# Patient Record
Sex: FEMALE | Race: WHITE | NOT HISPANIC OR LATINO | Employment: OTHER | URBAN - METROPOLITAN AREA
[De-identification: names, ages, dates, MRNs, and addresses within clinical notes are randomized per-mention and may not be internally consistent; named-entity substitution may affect disease eponyms.]

---

## 2017-11-27 ENCOUNTER — CONVERSION ENCOUNTER (OUTPATIENT)
Dept: GENERAL RADIOLOGY | Facility: HOSPITAL | Age: 62
End: 2017-11-27

## 2018-05-07 ENCOUNTER — OFFICE VISIT CONVERTED (OUTPATIENT)
Dept: ORTHOPEDIC SURGERY | Facility: CLINIC | Age: 63
End: 2018-05-07
Attending: ORTHOPAEDIC SURGERY

## 2018-11-06 ENCOUNTER — OFFICE VISIT CONVERTED (OUTPATIENT)
Dept: SURGERY | Facility: CLINIC | Age: 63
End: 2018-11-06
Attending: NURSE PRACTITIONER

## 2018-12-21 ENCOUNTER — CONVERSION ENCOUNTER (OUTPATIENT)
Dept: GENERAL RADIOLOGY | Facility: HOSPITAL | Age: 63
End: 2018-12-21

## 2019-01-04 ENCOUNTER — HOSPITAL ENCOUNTER (OUTPATIENT)
Dept: GASTROENTEROLOGY | Facility: HOSPITAL | Age: 64
Setting detail: HOSPITAL OUTPATIENT SURGERY
Discharge: HOME | End: 2019-01-04
Attending: SURGERY

## 2019-01-04 LAB — GLUCOSE BLD-MCNC: 150 MG/DL (ref 65–99)

## 2019-02-28 ENCOUNTER — HOSPITAL ENCOUNTER (OUTPATIENT)
Dept: SLEEP MEDICINE | Facility: HOSPITAL | Age: 64
Discharge: HOME | End: 2019-02-28
Attending: INTERNAL MEDICINE

## 2020-02-13 ENCOUNTER — HOSPITAL ENCOUNTER (OUTPATIENT)
Dept: LAB | Facility: HOSPITAL | Age: 65
Discharge: HOME | End: 2020-02-13
Attending: INTERNAL MEDICINE

## 2020-02-13 LAB
ANION GAP SERPL CALC-SCNC: 18 MMOL/L (ref 8–19)
BUN SERPL-MCNC: 13 MG/DL (ref 5–25)
BUN/CREAT SERPL: 14 {RATIO} (ref 6–20)
CALCIUM SERPL-MCNC: 9.3 MG/DL (ref 8.7–10.4)
CHLORIDE SERPL-SCNC: 98 MMOL/L (ref 99–111)
CONV CO2: 27 MMOL/L (ref 22–32)
CREAT UR-MCNC: 0.94 MG/DL (ref 0.5–0.9)
GFR SERPLBLD BASED ON 1.73 SQ M-ARVRAT: >60 ML/MIN/{1.73_M2}
GLUCOSE SERPL-MCNC: 122 MG/DL (ref 65–99)
OSMOLALITY SERPL CALC.SUM OF ELEC: 289 MOSM/KG (ref 273–304)
POTASSIUM SERPL-SCNC: 4.3 MMOL/L (ref 3.5–5.3)
SODIUM SERPL-SCNC: 139 MMOL/L (ref 135–147)

## 2020-05-29 ENCOUNTER — HOSPITAL ENCOUNTER (OUTPATIENT)
Dept: FAMILY MEDICINE CLINIC | Facility: CLINIC | Age: 65
Discharge: HOME | End: 2020-05-29
Attending: NURSE PRACTITIONER

## 2020-05-29 ENCOUNTER — CONVERSION ENCOUNTER (OUTPATIENT)
Dept: FAMILY MEDICINE CLINIC | Facility: CLINIC | Age: 65
End: 2020-05-29

## 2020-05-29 ENCOUNTER — OFFICE VISIT CONVERTED (OUTPATIENT)
Dept: FAMILY MEDICINE CLINIC | Facility: CLINIC | Age: 65
End: 2020-05-29
Attending: NURSE PRACTITIONER

## 2020-05-29 LAB
ALBUMIN SERPL-MCNC: 4.5 G/DL (ref 3.5–5)
ALBUMIN/GLOB SERPL: 1.6 {RATIO} (ref 1.4–2.6)
ALP SERPL-CCNC: 130 U/L (ref 43–160)
ALT SERPL-CCNC: 53 U/L (ref 10–40)
ANION GAP SERPL CALC-SCNC: 18 MMOL/L (ref 8–19)
APPEARANCE UR: CLEAR
AST SERPL-CCNC: 36 U/L (ref 15–50)
BASOPHILS # BLD AUTO: 0.06 10*3/UL (ref 0–0.2)
BASOPHILS NFR BLD AUTO: 0.8 % (ref 0–3)
BILIRUB SERPL-MCNC: 0.28 MG/DL (ref 0.2–1.3)
BILIRUB UR QL: NEGATIVE
BUN SERPL-MCNC: 18 MG/DL (ref 5–25)
BUN/CREAT SERPL: 18 {RATIO} (ref 6–20)
CALCIUM SERPL-MCNC: 10.8 MG/DL (ref 8.7–10.4)
CHLORIDE SERPL-SCNC: 101 MMOL/L (ref 99–111)
CHOLEST SERPL-MCNC: 148 MG/DL (ref 107–200)
CHOLEST/HDLC SERPL: 2.9 {RATIO} (ref 3–6)
COLOR UR: YELLOW
CONV ABS IMM GRAN: 0.1 10*3/UL (ref 0–0.2)
CONV CO2: 27 MMOL/L (ref 22–32)
CONV COLLECTION SOURCE (UA): NORMAL
CONV CREATININE URINE, RANDOM: 172.8 MG/DL (ref 10–300)
CONV IMMATURE GRAN: 1.3 % (ref 0–1.8)
CONV MICROALBUM.,U,RANDOM: <12 MG/L (ref 0–20)
CONV TOTAL PROTEIN: 7.3 G/DL (ref 6.3–8.2)
CONV UROBILINOGEN IN URINE BY AUTOMATED TEST STRIP: 0.2 {EHRLICHU}/DL (ref 0.1–1)
CREAT UR-MCNC: 0.99 MG/DL (ref 0.5–0.9)
DEPRECATED RDW RBC AUTO: 43.9 FL (ref 36.4–46.3)
EOSINOPHIL # BLD AUTO: 0.32 10*3/UL (ref 0–0.7)
EOSINOPHIL # BLD AUTO: 4.1 % (ref 0–7)
ERYTHROCYTE [DISTWIDTH] IN BLOOD BY AUTOMATED COUNT: 14.7 % (ref 11.7–14.4)
EST. AVERAGE GLUCOSE BLD GHB EST-MCNC: 169 MG/DL
FOLATE SERPL-MCNC: >20 NG/ML (ref 4.8–20)
GFR SERPLBLD BASED ON 1.73 SQ M-ARVRAT: 60 ML/MIN/{1.73_M2}
GLOBULIN UR ELPH-MCNC: 2.8 G/DL (ref 2–3.5)
GLUCOSE SERPL-MCNC: 115 MG/DL (ref 65–99)
GLUCOSE UR QL: NEGATIVE MG/DL
HBA1C MFR BLD: 7.5 % (ref 3.5–5.7)
HCT VFR BLD AUTO: 42.2 % (ref 37–47)
HDLC SERPL-MCNC: 51 MG/DL (ref 40–60)
HGB BLD-MCNC: 13.2 G/DL (ref 12–16)
HGB UR QL STRIP: NEGATIVE
IRON SATN MFR SERPL: 18 % (ref 20–55)
IRON SERPL-MCNC: 63 UG/DL (ref 60–170)
KETONES UR QL STRIP: NEGATIVE MG/DL
LDLC SERPL CALC-MCNC: 65 MG/DL (ref 70–100)
LEUKOCYTE ESTERASE UR QL STRIP: NEGATIVE
LYMPHOCYTES # BLD AUTO: 2.16 10*3/UL (ref 1–5)
LYMPHOCYTES NFR BLD AUTO: 27.5 % (ref 20–45)
MCH RBC QN AUTO: 25.5 PG (ref 27–31)
MCHC RBC AUTO-ENTMCNC: 31.3 G/DL (ref 33–37)
MCV RBC AUTO: 81.5 FL (ref 81–99)
MICROALBUMIN/CREAT UR: 6.9 MG/G{CRE} (ref 0–35)
MONOCYTES # BLD AUTO: 1.01 10*3/UL (ref 0.2–1.2)
MONOCYTES NFR BLD AUTO: 12.9 % (ref 3–10)
NEUTROPHILS # BLD AUTO: 4.2 10*3/UL (ref 2–8)
NEUTROPHILS NFR BLD AUTO: 53.4 % (ref 30–85)
NITRITE UR QL STRIP: NEGATIVE
NRBC CBCN: 0 % (ref 0–0.7)
OSMOLALITY SERPL CALC.SUM OF ELEC: 295 MOSM/KG (ref 273–304)
PH UR STRIP.AUTO: 5 [PH] (ref 5–8)
PLATELET # BLD AUTO: 277 10*3/UL (ref 130–400)
PMV BLD AUTO: 10.8 FL (ref 9.4–12.3)
POTASSIUM SERPL-SCNC: 4.8 MMOL/L (ref 3.5–5.3)
PROT UR QL: NEGATIVE MG/DL
RBC # BLD AUTO: 5.18 10*6/UL (ref 4.2–5.4)
SODIUM SERPL-SCNC: 141 MMOL/L (ref 135–147)
SP GR UR: 1.02 (ref 1–1.03)
T4 FREE SERPL-MCNC: 1.1 NG/DL (ref 0.9–1.8)
TIBC SERPL-MCNC: 342 UG/DL (ref 245–450)
TRANSFERRIN SERPL-MCNC: 239 MG/DL (ref 250–380)
TRIGL SERPL-MCNC: 162 MG/DL (ref 40–150)
TSH SERPL-ACNC: 4.39 M[IU]/L (ref 0.27–4.2)
VIT B12 SERPL-MCNC: 689 PG/ML (ref 211–911)
VLDLC SERPL-MCNC: 32 MG/DL (ref 5–37)
WBC # BLD AUTO: 7.85 10*3/UL (ref 4.8–10.8)

## 2020-06-12 ENCOUNTER — OFFICE VISIT CONVERTED (OUTPATIENT)
Dept: FAMILY MEDICINE CLINIC | Facility: CLINIC | Age: 65
End: 2020-06-12
Attending: NURSE PRACTITIONER

## 2020-06-12 ENCOUNTER — CONVERSION ENCOUNTER (OUTPATIENT)
Dept: FAMILY MEDICINE CLINIC | Facility: CLINIC | Age: 65
End: 2020-06-12

## 2020-06-18 ENCOUNTER — OFFICE VISIT CONVERTED (OUTPATIENT)
Dept: PODIATRY | Facility: CLINIC | Age: 65
End: 2020-06-18
Attending: PODIATRIST

## 2020-06-18 ENCOUNTER — CONVERSION ENCOUNTER (OUTPATIENT)
Dept: PODIATRY | Facility: CLINIC | Age: 65
End: 2020-06-18

## 2020-06-26 ENCOUNTER — OFFICE VISIT CONVERTED (OUTPATIENT)
Dept: SURGERY | Facility: CLINIC | Age: 65
End: 2020-06-26
Attending: NURSE PRACTITIONER

## 2020-07-13 ENCOUNTER — HOSPITAL ENCOUNTER (OUTPATIENT)
Dept: LAB | Facility: HOSPITAL | Age: 65
Discharge: HOME | End: 2020-07-13
Attending: NURSE PRACTITIONER

## 2020-07-13 LAB
T4 FREE SERPL-MCNC: 1.1 NG/DL (ref 0.9–1.8)
TSH SERPL-ACNC: 1.7 M[IU]/L (ref 0.27–4.2)

## 2020-07-29 ENCOUNTER — HOSPITAL ENCOUNTER (OUTPATIENT)
Dept: GENERAL RADIOLOGY | Facility: HOSPITAL | Age: 65
Discharge: HOME | End: 2020-07-29
Attending: NURSE PRACTITIONER

## 2020-08-06 ENCOUNTER — HOSPITAL ENCOUNTER (OUTPATIENT)
Dept: PREADMISSION TESTING | Facility: HOSPITAL | Age: 65
Discharge: HOME | End: 2020-08-06
Attending: SURGERY

## 2020-08-07 LAB — SARS-COV-2 RNA SPEC QL NAA+PROBE: NOT DETECTED

## 2020-08-10 ENCOUNTER — HOSPITAL ENCOUNTER (OUTPATIENT)
Dept: GASTROENTEROLOGY | Facility: HOSPITAL | Age: 65
Setting detail: HOSPITAL OUTPATIENT SURGERY
Discharge: HOME | End: 2020-08-10
Attending: SURGERY

## 2020-08-10 LAB — GLUCOSE BLD-MCNC: 125 MG/DL (ref 65–99)

## 2020-08-26 ENCOUNTER — OFFICE VISIT CONVERTED (OUTPATIENT)
Dept: SURGERY | Facility: CLINIC | Age: 65
End: 2020-08-26
Attending: NURSE PRACTITIONER

## 2020-09-11 ENCOUNTER — HOSPITAL ENCOUNTER (OUTPATIENT)
Dept: LAB | Facility: HOSPITAL | Age: 65
Discharge: HOME | End: 2020-09-11
Attending: NURSE PRACTITIONER

## 2020-09-11 LAB
APPEARANCE UR: CLEAR
BASOPHILS # BLD AUTO: 0.08 10*3/UL (ref 0–0.2)
BASOPHILS NFR BLD AUTO: 1 % (ref 0–3)
BILIRUB UR QL: NEGATIVE
COLOR UR: YELLOW
CONV ABS IMM GRAN: 0.06 10*3/UL (ref 0–0.2)
CONV COLLECTION SOURCE (UA): NORMAL
CONV IMMATURE GRAN: 0.8 % (ref 0–1.8)
CONV UROBILINOGEN IN URINE BY AUTOMATED TEST STRIP: 0.2 {EHRLICHU}/DL (ref 0.1–1)
DEPRECATED RDW RBC AUTO: 45.5 FL (ref 36.4–46.3)
EOSINOPHIL # BLD AUTO: 0.33 10*3/UL (ref 0–0.7)
EOSINOPHIL # BLD AUTO: 4.2 % (ref 0–7)
ERYTHROCYTE [DISTWIDTH] IN BLOOD BY AUTOMATED COUNT: 14.9 % (ref 11.7–14.4)
GLUCOSE UR QL: NEGATIVE MG/DL
HCT VFR BLD AUTO: 43.2 % (ref 37–47)
HGB BLD-MCNC: 12.8 G/DL (ref 12–16)
HGB UR QL STRIP: NEGATIVE
KETONES UR QL STRIP: NEGATIVE MG/DL
LEUKOCYTE ESTERASE UR QL STRIP: NEGATIVE
LYMPHOCYTES # BLD AUTO: 2.26 10*3/UL (ref 1–5)
LYMPHOCYTES NFR BLD AUTO: 28.6 % (ref 20–45)
MCH RBC QN AUTO: 24.8 PG (ref 27–31)
MCHC RBC AUTO-ENTMCNC: 29.6 G/DL (ref 33–37)
MCV RBC AUTO: 83.6 FL (ref 81–99)
MONOCYTES # BLD AUTO: 0.99 10*3/UL (ref 0.2–1.2)
MONOCYTES NFR BLD AUTO: 12.5 % (ref 3–10)
NEUTROPHILS # BLD AUTO: 4.17 10*3/UL (ref 2–8)
NEUTROPHILS NFR BLD AUTO: 52.9 % (ref 30–85)
NITRITE UR QL STRIP: NEGATIVE
NRBC CBCN: 0 % (ref 0–0.7)
PH UR STRIP.AUTO: 5 [PH] (ref 5–8)
PLATELET # BLD AUTO: 285 10*3/UL (ref 130–400)
PMV BLD AUTO: 11 FL (ref 9.4–12.3)
PROT UR QL: NEGATIVE MG/DL
RBC # BLD AUTO: 5.17 10*6/UL (ref 4.2–5.4)
SP GR UR: 1.02 (ref 1–1.03)
T4 FREE SERPL-MCNC: 1 NG/DL (ref 0.9–1.8)
TSH SERPL-ACNC: 2.17 M[IU]/L (ref 0.27–4.2)
WBC # BLD AUTO: 7.89 10*3/UL (ref 4.8–10.8)

## 2020-09-12 LAB
ALBUMIN SERPL-MCNC: 4.3 G/DL (ref 3.5–5)
ALBUMIN/GLOB SERPL: 1.5 {RATIO} (ref 1.4–2.6)
ALP SERPL-CCNC: 122 U/L (ref 43–160)
ALT SERPL-CCNC: 31 U/L (ref 10–40)
ANION GAP SERPL CALC-SCNC: 19 MMOL/L (ref 8–19)
AST SERPL-CCNC: 29 U/L (ref 15–50)
BILIRUB SERPL-MCNC: 0.45 MG/DL (ref 0.2–1.3)
BUN SERPL-MCNC: 22 MG/DL (ref 5–25)
BUN/CREAT SERPL: 21 {RATIO} (ref 6–20)
CALCIUM SERPL-MCNC: 9.5 MG/DL (ref 8.7–10.4)
CHLORIDE SERPL-SCNC: 98 MMOL/L (ref 99–111)
CHOLEST SERPL-MCNC: 183 MG/DL (ref 107–200)
CHOLEST/HDLC SERPL: 3.6 {RATIO} (ref 3–6)
CONV CO2: 26 MMOL/L (ref 22–32)
CONV CREATININE URINE, RANDOM: 218.6 MG/DL (ref 10–300)
CONV MICROALBUM.,U,RANDOM: <12 MG/L (ref 0–20)
CONV TOTAL PROTEIN: 7.1 G/DL (ref 6.3–8.2)
CREAT UR-MCNC: 1.05 MG/DL (ref 0.5–0.9)
EST. AVERAGE GLUCOSE BLD GHB EST-MCNC: 148 MG/DL
GFR SERPLBLD BASED ON 1.73 SQ M-ARVRAT: 55 ML/MIN/{1.73_M2}
GLOBULIN UR ELPH-MCNC: 2.8 G/DL (ref 2–3.5)
GLUCOSE SERPL-MCNC: 133 MG/DL (ref 65–99)
HBA1C MFR BLD: 6.8 % (ref 3.5–5.7)
HDLC SERPL-MCNC: 51 MG/DL (ref 40–60)
LDLC SERPL CALC-MCNC: 97 MG/DL (ref 70–100)
MICROALBUMIN/CREAT UR: 5.5 MG/G{CRE} (ref 0–35)
OSMOLALITY SERPL CALC.SUM OF ELEC: 289 MOSM/KG (ref 273–304)
POTASSIUM SERPL-SCNC: 5.5 MMOL/L (ref 3.5–5.3)
SODIUM SERPL-SCNC: 137 MMOL/L (ref 135–147)
TRIGL SERPL-MCNC: 175 MG/DL (ref 40–150)
VLDLC SERPL-MCNC: 35 MG/DL (ref 5–37)

## 2020-09-15 ENCOUNTER — HOSPITAL ENCOUNTER (OUTPATIENT)
Dept: LAB | Facility: HOSPITAL | Age: 65
Discharge: HOME | End: 2020-09-15
Attending: NURSE PRACTITIONER

## 2020-09-15 ENCOUNTER — OFFICE VISIT CONVERTED (OUTPATIENT)
Dept: FAMILY MEDICINE CLINIC | Facility: CLINIC | Age: 65
End: 2020-09-15
Attending: NURSE PRACTITIONER

## 2020-09-15 LAB
ALBUMIN SERPL-MCNC: 4.3 G/DL (ref 3.5–5)
ALBUMIN/GLOB SERPL: 1.5 {RATIO} (ref 1.4–2.6)
ALP SERPL-CCNC: 134 U/L (ref 43–160)
ALT SERPL-CCNC: 35 U/L (ref 10–40)
ANION GAP SERPL CALC-SCNC: 16 MMOL/L (ref 8–19)
AST SERPL-CCNC: 29 U/L (ref 15–50)
BILIRUB SERPL-MCNC: 0.42 MG/DL (ref 0.2–1.3)
BUN SERPL-MCNC: 18 MG/DL (ref 5–25)
BUN/CREAT SERPL: 20 {RATIO} (ref 6–20)
CALCIUM SERPL-MCNC: 9.2 MG/DL (ref 8.7–10.4)
CHLORIDE SERPL-SCNC: 102 MMOL/L (ref 99–111)
CONV CO2: 27 MMOL/L (ref 22–32)
CONV TOTAL PROTEIN: 7.1 G/DL (ref 6.3–8.2)
CREAT UR-MCNC: 0.9 MG/DL (ref 0.5–0.9)
GFR SERPLBLD BASED ON 1.73 SQ M-ARVRAT: >60 ML/MIN/{1.73_M2}
GLOBULIN UR ELPH-MCNC: 2.8 G/DL (ref 2–3.5)
GLUCOSE SERPL-MCNC: 99 MG/DL (ref 65–99)
OSMOLALITY SERPL CALC.SUM OF ELEC: 292 MOSM/KG (ref 273–304)
POTASSIUM SERPL-SCNC: 4.6 MMOL/L (ref 3.5–5.3)
SODIUM SERPL-SCNC: 140 MMOL/L (ref 135–147)

## 2020-09-30 ENCOUNTER — HOSPITAL ENCOUNTER (OUTPATIENT)
Dept: URGENT CARE | Facility: CLINIC | Age: 65
Discharge: HOME | End: 2020-09-30
Attending: NURSE PRACTITIONER

## 2020-09-30 ENCOUNTER — OFFICE VISIT CONVERTED (OUTPATIENT)
Dept: FAMILY MEDICINE CLINIC | Facility: CLINIC | Age: 65
End: 2020-09-30
Attending: NURSE PRACTITIONER

## 2020-10-03 LAB — SARS-COV-2 RNA SPEC QL NAA+PROBE: NOT DETECTED

## 2020-10-05 ENCOUNTER — OFFICE VISIT CONVERTED (OUTPATIENT)
Dept: PODIATRY | Facility: CLINIC | Age: 65
End: 2020-10-05
Attending: PODIATRIST

## 2020-11-23 ENCOUNTER — HOSPITAL ENCOUNTER (OUTPATIENT)
Dept: GENERAL RADIOLOGY | Facility: HOSPITAL | Age: 65
Discharge: HOME | End: 2020-11-23
Attending: NURSE PRACTITIONER

## 2021-01-21 ENCOUNTER — OFFICE VISIT CONVERTED (OUTPATIENT)
Dept: FAMILY MEDICINE CLINIC | Facility: CLINIC | Age: 66
End: 2021-01-21
Attending: NURSE PRACTITIONER

## 2021-01-21 ENCOUNTER — CONVERSION ENCOUNTER (OUTPATIENT)
Dept: FAMILY MEDICINE CLINIC | Facility: CLINIC | Age: 66
End: 2021-01-21

## 2021-03-11 ENCOUNTER — HOSPITAL ENCOUNTER (OUTPATIENT)
Dept: LAB | Facility: HOSPITAL | Age: 66
Discharge: HOME | End: 2021-03-11
Attending: NURSE PRACTITIONER

## 2021-03-11 LAB
ALBUMIN SERPL-MCNC: 4.2 G/DL (ref 3.5–5)
ALBUMIN/GLOB SERPL: 1.4 {RATIO} (ref 1.4–2.6)
ALP SERPL-CCNC: 130 U/L (ref 43–160)
ALT SERPL-CCNC: 30 U/L (ref 10–40)
ANION GAP SERPL CALC-SCNC: 15 MMOL/L (ref 8–19)
APPEARANCE UR: CLEAR
AST SERPL-CCNC: 24 U/L (ref 15–50)
BASOPHILS # BLD AUTO: 0.05 10*3/UL (ref 0–0.2)
BASOPHILS NFR BLD AUTO: 0.9 % (ref 0–3)
BILIRUB SERPL-MCNC: 0.3 MG/DL (ref 0.2–1.3)
BILIRUB UR QL: NEGATIVE
BUN SERPL-MCNC: 18 MG/DL (ref 5–25)
BUN/CREAT SERPL: 18 {RATIO} (ref 6–20)
CALCIUM SERPL-MCNC: 9.1 MG/DL (ref 8.7–10.4)
CHLORIDE SERPL-SCNC: 103 MMOL/L (ref 99–111)
CHOLEST SERPL-MCNC: 169 MG/DL (ref 107–200)
CHOLEST/HDLC SERPL: 2.9 {RATIO} (ref 3–6)
COLOR UR: YELLOW
CONV ABS IMM GRAN: 0.04 10*3/UL (ref 0–0.2)
CONV CO2: 27 MMOL/L (ref 22–32)
CONV COLLECTION SOURCE (UA): NORMAL
CONV CREATININE URINE, RANDOM: 96.4 MG/DL (ref 10–300)
CONV IMMATURE GRAN: 0.7 % (ref 0–1.8)
CONV MICROALBUM.,U,RANDOM: <12 MG/L (ref 0–20)
CONV TOTAL PROTEIN: 7.1 G/DL (ref 6.3–8.2)
CONV UROBILINOGEN IN URINE BY AUTOMATED TEST STRIP: 0.2 {EHRLICHU}/DL (ref 0.1–1)
CREAT UR-MCNC: 1.01 MG/DL (ref 0.5–0.9)
DEPRECATED RDW RBC AUTO: 43.1 FL (ref 36.4–46.3)
EOSINOPHIL # BLD AUTO: 0.22 10*3/UL (ref 0–0.7)
EOSINOPHIL # BLD AUTO: 3.8 % (ref 0–7)
ERYTHROCYTE [DISTWIDTH] IN BLOOD BY AUTOMATED COUNT: 14.9 % (ref 11.7–14.4)
EST. AVERAGE GLUCOSE BLD GHB EST-MCNC: 151 MG/DL
GFR SERPLBLD BASED ON 1.73 SQ M-ARVRAT: 58 ML/MIN/{1.73_M2}
GLOBULIN UR ELPH-MCNC: 2.9 G/DL (ref 2–3.5)
GLUCOSE SERPL-MCNC: 122 MG/DL (ref 65–99)
GLUCOSE UR QL: NEGATIVE MG/DL
HBA1C MFR BLD: 6.9 % (ref 3.5–5.7)
HCT VFR BLD AUTO: 44.1 % (ref 37–47)
HDLC SERPL-MCNC: 59 MG/DL (ref 40–60)
HGB BLD-MCNC: 13.8 G/DL (ref 12–16)
HGB UR QL STRIP: NEGATIVE
KETONES UR QL STRIP: NEGATIVE MG/DL
LDLC SERPL CALC-MCNC: 79 MG/DL (ref 70–100)
LEUKOCYTE ESTERASE UR QL STRIP: NEGATIVE
LYMPHOCYTES # BLD AUTO: 1.47 10*3/UL (ref 1–5)
LYMPHOCYTES NFR BLD AUTO: 25.7 % (ref 20–45)
MCH RBC QN AUTO: 25 PG (ref 27–31)
MCHC RBC AUTO-ENTMCNC: 31.3 G/DL (ref 33–37)
MCV RBC AUTO: 79.7 FL (ref 81–99)
MICROALBUMIN/CREAT UR: 12.4 MG/G{CRE} (ref 0–35)
MONOCYTES # BLD AUTO: 0.63 10*3/UL (ref 0.2–1.2)
MONOCYTES NFR BLD AUTO: 11 % (ref 3–10)
NEUTROPHILS # BLD AUTO: 3.32 10*3/UL (ref 2–8)
NEUTROPHILS NFR BLD AUTO: 57.9 % (ref 30–85)
NITRITE UR QL STRIP: NEGATIVE
NRBC CBCN: 0 % (ref 0–0.7)
OSMOLALITY SERPL CALC.SUM OF ELEC: 293 MOSM/KG (ref 273–304)
PH UR STRIP.AUTO: 6.5 [PH] (ref 5–8)
PLATELET # BLD AUTO: 238 10*3/UL (ref 130–400)
PMV BLD AUTO: 10.2 FL (ref 9.4–12.3)
POTASSIUM SERPL-SCNC: 5 MMOL/L (ref 3.5–5.3)
PROT UR QL: NEGATIVE MG/DL
RBC # BLD AUTO: 5.53 10*6/UL (ref 4.2–5.4)
SODIUM SERPL-SCNC: 140 MMOL/L (ref 135–147)
SP GR UR: 1.02 (ref 1–1.03)
T4 FREE SERPL-MCNC: 0.9 NG/DL (ref 0.9–1.8)
TRIGL SERPL-MCNC: 157 MG/DL (ref 40–150)
TSH SERPL-ACNC: 2.32 M[IU]/L (ref 0.27–4.2)
VLDLC SERPL-MCNC: 31 MG/DL (ref 5–37)
WBC # BLD AUTO: 5.73 10*3/UL (ref 4.8–10.8)

## 2021-03-12 LAB — HCV AB S/CO SERPL IA: <0.1 S/CO RATIO (ref 0–0.9)

## 2021-03-16 ENCOUNTER — OFFICE VISIT CONVERTED (OUTPATIENT)
Dept: FAMILY MEDICINE CLINIC | Facility: CLINIC | Age: 66
End: 2021-03-16
Attending: NURSE PRACTITIONER

## 2021-04-06 ENCOUNTER — HOSPITAL ENCOUNTER (OUTPATIENT)
Dept: FAMILY MEDICINE CLINIC | Facility: CLINIC | Age: 66
Discharge: HOME | End: 2021-04-06
Attending: NURSE PRACTITIONER

## 2021-04-08 LAB — SARS-COV-2 AB SERPL QL IA: POSITIVE

## 2021-05-10 NOTE — H&P
History and Physical      Patient Name: Vickie Bull   Patient ID: [id number]   Sex: Female   YOB: 1955    Primary Care Provider: Nguyen MAXWELL   Referring Provider: Nguyen MAXWELL    Visit Date: August 26, 2020    Provider: HANS Greenfield   Location[facility]   Location Address: [address] [place] [address]  [id number]   Location Phone: [phone]          Chief Complaint  · Follow Up EGD      History Of Present Illness  Vickie Bull is a 64 year old /White female who is here to follow up EGD.      Patient presents today for follow-up visit after undergoing an EGD on 8/10/2020 performed by Dr. Jose Miguel Harp.  Patient was with some mild gastritis per EGD/pathology.  Patient is still with breakthrough symptoms at night.  She has worsening wishing to try to get off of Prilosec, concerned about side effects of this medication.  She denies any postoperative complications.       Past Medical History  Disease Name Date Onset Notes   Allergic rhinitis, chronic --  --    Anemia, Unspecified --  --    Corns and callus --  --    Diabetes --  --    Diabetes, unspecified --  --    Diarrhea 04/15/2014 --    GERD --  --    High blood pressure --  --    High cholesterol --  --    Hyperlipemia --  --    Hypertension --  --    Hypothyroidism --  --    Ingrown toenail --  --    Lumbar disc herniation, L3-4 11/22/2013 very small far lat disc.    Numbness in feet --  --    Pain, Leg --  --    Pain, Lumbar --  --    Reflux --  --    Sciatica 11/22/2013 no sig structural compressive lesion.    Seasonal allergies --  --    Stomach Ulcer --  --    Thyroid disorder --  --    Ulcer --  --          Past Surgical History  Procedure Name Date Notes   Colonoscopy --  --    EGD --  --    Joint Surgery --  --    shoulder repair --  --    Shoulder surgery  right   Tubal ligation --  --          Medication List  Name Date Started Instructions   duloxetine 60  mg oral capsule,delayed release(DR/EC)  take 1 capsule (60 mg) by oral route once daily   famotidine 40 mg oral tablet 08/26/2020 take 1 tablet (40 mg) by oral route every 12 hours for 30 days   gabapentin 600 mg oral tablet  take 1 tablet by oral route daily   Glucose monitor 06/02/2020 check blood sugar twice daily   Lipitor 20 mg Oral tablet  take 1 tablet (20 mg) by oral route once daily   lisinopril 10 mg oral tablet  take 1 tablet (10 mg) by oral route once daily   metformin 500 mg oral tablet extended release 24hr 05/29/2020 take 2 tablets (1,000 mg) by oral route once daily with the evening meal for 30 days   multivitamin oral tablet  --    Prilosec OTC 20 mg oral tablet,delayed release (DR/EC)  --    Synthroid 50 mcg oral tablet 05/29/2020 take 1 tablet (50 mcg) by oral route once daily for 90 days   test strips 06/02/2020 test blood sugar twice daily   Vitamin C 500 mg oral tablet  take 1 tablet by oral route daily   Vitamin D3 2,000 unit Oral capsule  take 1 capsule by oral route daily   zinc 50 mg oral tablet  take 1 tablet by oral route daily   Zyrtec 10 mg oral tablet  take 1 tablet (10 mg) by oral route once daily         Allergy List  Allergen Name Date Reaction Notes   ampicillin --  --  --    Keflex --  --  --    Latex --  --  --    Latex Exam Gloves --  --  --    PENICILLINS --  --  --        Allergies Reconciled  Family Medical History  Disease Name Relative/Age Notes   Cancer, Unspecified Mother/   Mother  Mother  Mother  Mother; Father   Diabetes, unspecified type Father/  Grandmother (paternal)/   Father; Grandmother (paternal); Aunt (paternal); Uncle (paternal)  Grandmother (paternal); Aunt (paternal)  Father   Heart Attack (MI)  --    Prostate cancer Father/   Father   Family history of prostate cancer Father/   --    Family history of lung cancer Mother/   --    Family history of diabetes mellitus (DM)  --          Social History  Finding Status Start/Stop Quantity Notes   Alcohol  "Current some day --/-- --  drinks rarely; wine and liquor   Alcohol Use Current some day --/-- --  rarely drinks  rarely drinks  rarely drinks, less than 1 drink per day, has been drinking for 31 or more years  rarely drinks, less than 1 drink per day   Caffeine Current every day --/-- --  drinks regularly; soft drinks; 3-4 times per day   lives with spouse --  --/-- --  --    . --  --/-- --  --    Recreational Drug Use Never --/-- --  no   Retired. --  --/-- --  --    Second hand smoke exposure Never --/-- --  no   Tobacco Never --/-- --  never a smoker  never smoker         Review of Systems  · Constitutional  o Denies  o : chills, fever  · Eyes  o Denies  o : yellowish discoloration of eyes  · HENT  o Denies  o : difficulty swallowing  · Cardiovascular  o Denies  o : chest pain on exertion  · Respiratory  o Denies  o : shortness of breath  · Gastrointestinal  o Admits  o : heartburn, reflux  o Denies  o : nausea, vomiting, diarrhea, constipation  · Genitourinary  o Denies  o : abnormal color of urine  · Integument  o Denies  o : rash  · Neurologic  o Denies  o : tingling or numbness  · Musculoskeletal  o Denies  o : joint pain  · Endocrine  o Denies  o : weight gain, weight loss      Vitals  Date Time BP Position Site L\R Cuff Size HR RR TEMP (F) WT  HT  BMI kg/m2 BSA m2 O2 Sat        08/26/2020 09:29 AM       12  219lbs 4oz 5'  4\" 37.63 2.12           Physical Examination  · Constitutional  o Appearance  o : well developed, well-nourished, patient in no apparent distress  · Head and Face  o Head  o :   § Inspection  § : atraumatic, normocephalic  o Face  o :   § Inspection  § : no facial lesions  · Eyes  o Conjunctivae  o : conjunctivae normal  o Sclerae  o : sclerae white  · Neck  o Inspection/Palpation  o : normal appearance, no masses or tenderness, trachea midline  · Respiratory  o Respiratory Effort  o : breathing unlabored  · Skin and Subcutaneous Tissue  o General Inspection  o : no lesions " present, no areas of discoloration, skin turgor normal, texture normal  · Neurologic  o Mental Status Examination  o :   § Orientation  § : grossly oriented to person, place and time  § Attention  § : attention normal, concentration abilities normal  § Fund of Knowledge  § : fund of knowledge within normal limits, patient aware of current events  o Gait and Station  o : normal gait, able to stand without difficulty  · Psychiatric  o Judgement and Insight  o : judgment and insight intact  o Mood and Affect  o : mood normal, affect appropriate          Assessment  · Gastritis     535.50/K29.70  · S/P endoscopy     V45.89/Z98.890      Plan  · Medications  o Medications have been Reconciled  o Transition of Care or Provider Policy  · Instructions  o Follow up PRN.  o We will try Pepcid twice daily. And will try to discontinue the Prilosec.  o Follow-up as needed  o Electronically Identified Patient Education Materials Provided Electronically            Electronically Signed by: HANS Greenfield -Author on August 26, 2020 09:54:11 AM

## 2021-05-10 NOTE — H&P
History and Physical      Patient Name: Vickie Bull   Patient ID: [id number]   Sex: Female   YOB: 1955    Primary Care Provider: Nguyen MAXWELL   Referring Provider: Nguyen MAXWELL    Visit Date: June 26, 2020    Provider: HANS Greenfield   Location: [facility]   Location Address: [address] [place] [address]  [id number]   Location Phone: [phone]          Chief Complaint  · Requesting EGD  · Age 50 or over  · GERD      History Of Present Illness  The patient is a 64 year old /White female presenting to the [facility] on a referral from Nguyen MAXWELL.   Vickie Bull needs to have a diagnostic EGD.   Patient states that they have had a colonoscopy. 1 year ago   Patient currently complains of: GERD and heartburn   Patient Does not have family history of colon cancer.      Patient presents today on referral from Nguyen Zhong for GERD symptoms.  Patient presents today with complaints of severe acid reflux.  Patient is trying to discontinue her omeprazole due to side effects of the medication.  She is with complaints of severe acid reflux and had to restart omeprazole.  Patient is with a hiatal hernia and we discussed the side effects of the hiatal hernia today.  She denies any abdominal pain or dysphasia.  She does have a history of a gastric ulcer in the past.  Denies any family history of esophageal or stomach cancer.    1/2019- Colonoscopy ([facility]): Normal colon.     5/2014 - Colonoscopy ([facility]): normal colon.    5/2011 - EGD ([facility]): Hiatal hernia; distal esophagitis; chronic gastritis.     6/2007 - Colonoscopy ([facility]): normal colon.    5/2003 - EGD ([facility]): gastritis    3/2003 - EGD & Colonoscopy ([facility]): Small gastric ulcer; reflux esophagitis; Proctitis and internal hemorrhoids; Cecum - hyperplastic.            Past Medical History  Disease Name Date Onset Notes   Allergic  rhinitis, chronic --  --    Anemia, Unspecified --  --    Corns and callus --  --    Diabetes --  --    Diabetes, unspecified --  --    Diarrhea 04/15/2014 --    GERD --  --    High blood pressure --  --    High cholesterol --  --    Hyperlipemia --  --    Hypertension --  --    Hypothyroidism --  --    Ingrown toenail --  --    Lumbar disc herniation, L3-4 11/22/2013 very small far lat disc.    Numbness in feet --  --    Pain, Leg --  --    Pain, Lumbar --  --    Reflux --  --    Sciatica 11/22/2013 no sig structural compressive lesion.    Seasonal allergies --  --    Stomach Ulcer --  --    Thyroid disorder --  --    Ulcer --  --          Past Surgical History  Procedure Name Date Notes   Colonoscopy --  --    EGD --  --    Joint Surgery --  --    shoulder repair --  --    Shoulder surgery  right   Tubal ligation --  --          Medication List  Name Date Started Instructions   duloxetine 60 mg oral capsule,delayed release(DR/EC)  take 1 capsule (60 mg) by oral route once daily   gabapentin 600 mg oral tablet  take 1 tablet by oral route daily   Glucose monitor 06/02/2020 check blood sugar twice daily   Lipitor 20 mg Oral tablet  take 1 tablet (20 mg) by oral route once daily   lisinopril 10 mg oral tablet  take 1 tablet (10 mg) by oral route once daily   metformin 500 mg oral tablet extended release 24hr 05/29/2020 take 2 tablets (1,000 mg) by oral route once daily with the evening meal for 30 days   omeprazole 20 mg oral capsule,delayed release(DR/EC)  take 1 capsule (20 mg) by oral route once daily before a meal   Synthroid 50 mcg oral tablet 05/29/2020 take 1 tablet (50 mcg) by oral route once daily for 90 days   test strips 06/02/2020 test blood sugar twice daily   Vitamin C 500 mg oral tablet  take 1 tablet by oral route daily   Vitamin D3 2,000 unit Oral capsule  take 1 capsule by oral route daily   zinc 50 mg oral tablet  take 1 tablet by oral route daily   Zyrtec 10 mg oral tablet  take 1 tablet  (10 mg) by oral route once daily         Allergy List  Allergen Name Date Reaction Notes   ampicillin --  --  --    Keflex --  --  --    Latex --  --  --    Latex Exam Gloves --  --  --    PENICILLINS --  --  --          Family Medical History  Disease Name Relative/Age Notes   Cancer, Unspecified Mother/   Mother  Mother  Mother  Mother; Father   Diabetes, unspecified type Grandmother (paternal)/   Grandmother (paternal); Aunt (paternal)  Father   Heart Attack (MI)  --    Prostate cancer Father/   Father   Family history of prostate cancer Father/   --    Family history of lung cancer Mother/   --    Family history of diabetes mellitus (DM)  --          Social History  Finding Status Start/Stop Quantity Notes   Alcohol Current some day --/-- --  drinks rarely; wine and liquor   Alcohol Use Current some day --/-- --  rarely drinks  rarely drinks  rarely drinks, less than 1 drink per day, has been drinking for 31 or more years  rarely drinks, less than 1 drink per day   Caffeine Current every day --/-- --  drinks regularly; soft drinks; 3-4 times per day   lives with spouse --  --/-- --  --    . --  --/-- --  --    Recreational Drug Use Never --/-- --  no   Retired. --  --/-- --  --    Second hand smoke exposure Never --/-- --  no   Tobacco Never --/-- --  never a smoker  never smoker         Review of Systems  · Constitutional  o Denies  o : fever, chills  · Eyes  o Denies  o : yellowish discoloration of eyes  · HENT  o Denies  o : difficulty swallowing  · Cardiovascular  o Denies  o : chest pain, chest pain on exertion  · Respiratory  o Denies  o : shortness of breath  · Gastrointestinal  o Admits  o : heartburn, reflux  o Denies  o : nausea, vomiting, diarrhea, constipation  · Genitourinary  o Denies  o : abnormal color of urine  · Integument  o Denies  o : rash  · Neurologic  o Denies  o : tingling or numbness  · Musculoskeletal  o Denies  o : joint pain  · Endocrine  o Denies  o : weight gain,  "weight loss      Vitals  Date Time BP Position Site L\R Cuff Size HR RR TEMP (F) WT  HT  BMI kg/m2 BSA m2 O2 Sat HC       06/26/2020 10:31 AM       16  120lbs 16oz 5'  4\" 20.77 1.57           Physical Examination  · Constitutional  o Appearance  o : well developed, well-nourished, patient in no apparent distress  · Head and Face  o Head  o :   § Inspection  § : atraumatic, normocephalic  o Face  o :   § Inspection  § : no facial lesions  · Eyes  o Conjunctivae  o : conjunctivae normal  o Sclerae  o : sclerae white  · Neck  o Inspection/Palpation  o : normal appearance, no masses or tenderness, trachea midline  · Respiratory  o Respiratory Effort  o : breathing unlabored  · Skin and Subcutaneous Tissue  o General Inspection  o : no lesions present, no areas of discoloration, skin turgor normal, texture normal  · Neurologic  o Mental Status Examination  o :   § Orientation  § : grossly oriented to person, place and time  § Attention  § : attention normal, concentration abilities normal  § Fund of Knowledge  § : fund of knowledge within normal limits, patient aware of current events  o Gait and Station  o : normal gait, able to stand without difficulty  · Psychiatric  o Judgement and Insight  o : judgment and insight intact  o Mood and Affect  o : mood normal, affect appropriate          Assessment  · GERD (gastroesophageal reflux disease)     530.81/K21.9  · Hiatal hernia     553.3/K44.9  · Pre-op testing     V72.84/Z01.818      Plan  · Orders  o Consent for Esophagogastroduodenoscopy (EGD) with dilation - Possible risks/complications, benefits, and alternatives to surgical or invasive procedure have been explained to patient and/or legal guardian. - Patient has been evaluated and can tolerate anesthesia and/or sedation. Risks, benefits, and alternatives to anesthesia and sedation have been explained to patient and/or legal guardian. (84356) - 530.81/K21.9, 553.3/K44.9 - 08/10/2020  o [facility] Pre-Op Covid-19 Screening " (87606) - V72.84/Z01.818 - 08/03/2020  · Medications  o Medications have been Reconciled  o Transition of Care or Provider Policy  · Instructions  o Surgical Facility: [facility]  o Handouts Provided Pre-Procedure Instructions including date, time, and location of procedure.   o PLAN: Proceeed with EGD. Patient understands risks/benefits and is willing to proceed.   o ***Surgical Orders***  o RISK AND BENEFITS:  o Given these options, the patient has verbally expressed an understanding of the risks of the surgery and finds these risks acceptable. Will proceed with surgery as soon as possible.  o O.R. PREP: Per protocol   o IV: Per Anesthesia  o Please sign permit for: Esophagogastroduodenoscopy with possible biopsies and dilation by Dr. Harp.  o The above History and Physical Examination has been completed within 30 days of admission.  o ***Patient Status***  o Outpatient  o Follow up in the in the office post procedure.  o Patient was advised she did need COVID-19 testing prior to procedure. Educated patient she needs self isolate in between testing and procedure. Patient verbalizes understanding and is willing to proceed.  o Electronically Identified Patient Education Materials Provided Electronically            Electronically Signed by: HANS Greenfield -Author on June 26, 2020 11:22:19 AM

## 2021-05-10 NOTE — H&P
History and Physical      Patient Name: Vickie Bull   Patient ID: [id number]   Sex: Female   YOB: 1955    Primary Care Provider: Nguyen MAXWELL   Referring Provider: Nguyen MAXWELL    Visit Date: May 29, 2020    Provider: HANS Nguyen   Location: [facility]   Location Address: [address] [place] [address]  [id number]   Location Phone: [phone]          Chief Complaint  · Establish care with new PCP      History Of Present Illness  Vickie Bull is a 64 year old /White female who presents for evaluation and treatment of:      Establish care with new PCP    PMH: DM2, HTN, Hypothyroidism, Hyperlipidemia, Fibromyalgia, Depression, Anemia, Allergies, GERD, neuropathy, chronic low back pain     FMH: Heart disease (father and maternal side), Diabetes (father and paternal side), Lung Cancer (mother)     Last pap: 2018, [facility]  Last mammo: 2018  Last colon: 1/2019 (in records)  Last PCP: ASHLYN WOO    Eye exam: [facility] (end of 2019)  Foot exam: Has not had one    Needs testing supplies for sugars    C/O tick bite on inner thigh  Believes it could have cellulitis                 Review of Systems  · Constitutional  o Denies  o : fever  · Eyes  o Denies  o : blurred vision, changes in vision  · HENT  o Denies  o : headaches  · Breasts  o Denies  o : lumps, tenderness, swelling  · Cardiovascular  o Denies  o : chest pain  · Respiratory  o Denies  o : cough  · Gastrointestinal  o Denies  o : nausea, vomiting, diarrhea, constipation, abdominal pain, melena  · Genitourinary  o Denies  o : dysuria  · Integument  o Admits  o : itching, new skin lesions  · Neurologic  o Denies  o : tingling or numbness  · Musculoskeletal  o Admits  o : muscle pain, back pain  · Endocrine  o Admits  o : central obesity  o Denies  o : polyuria, polydipsia      Vitals  Date Time BP Position Site L\R Cuff Size HR RR TEMP (F) WT   "HT  BMI kg/m2 BSA m2 O2 Sat HC       05/07/2018 09:51 AM      78 - R   202lbs 5oz 5'  4\" 34.73 2.04 98 %    11/06/2018 02:23 PM       16  220lbs 6oz 5'  4\" 37.83 2.12     05/29/2020 09:14 /56 Sitting    86 - R   222lbs 6oz 5'  4\" 38.17 2.13 94 %          Physical Examination  · Constitutional  o Appearance  o : well-nourished, in no acute distress  · Eyes  o Conjunctivae  o : conjunctivae normal  o Sclerae  o : sclerae white  o Pupils and Irises  o : pupils equal and round  o Eyelids/Ocular Adnexae  o : eyelid appearance normal, no exudates present  · Ears, Nose, Mouth and Throat  o Ears  o :   § External Ears  § : external auditory canal appearance within normal limits, no discharge present  § Otoscopic Examination  § : tympanic membrane appearance within normal limits bilaterally, cerumen not present  o Nose  o :   § External Nose  § : appearance normal  § Intranasal Exam  § : mucosa within normal limits,  § Nasopharynx  § : no discharge present  o Oral Cavity  o :   § Oral Mucosa  § : oral mucosa normal  § Lips  § : lip appearance normal  § Teeth  § : normal dentation for age  o Throat  o :   § Oropharynx  § : no inflammation or lesions present, tonsils within normal limits  · Neck  o Inspection/Palpation  o : normal appearance, no masses or tenderness, trachea midline  o Thyroid  o : gland size normal, nontender  · Respiratory  o Respiratory Effort  o : breathing unlabored  o Inspection of Chest  o : normal appearance  o Auscultation of Lungs  o : normal breath sounds throughout inspiration and expiration  · Cardiovascular  o Heart  o :   § Auscultation of Heart  § : regular rate and rhythm, no murmurs  o Peripheral Vascular System  o :   § Carotid Arteries  § : no bruits present  § Pedal Pulses  § : pulses 2 bilaterally  § Extremities  § : no clubbing or edema  · Gastrointestinal  o Abdominal Examination  o : abdomen nontender to palpation, tone normal without rigidity or guarding, no masses present, " bowel sounds present   · Lymphatic  o Neck  o : no lymphadenopathy present  · Skin and Subcutaneous Tissue  o General Inspection  o : erythematous scabbed lesion with surrounding erythema, warm to touch  · Neurologic  o Mental Status Examination  o :   § Orientation  § : grossly oriented to person, place and time  o Gait and Station  o : normal gait, able to stand without difficulty  · Psychiatric  o Judgement and Insight  o : judgment and insight intact  o Mood and Affect  o : mood normal, affect appropriate          Assessment  · Screening for depression     V79.0/Z13.89  · Visit for screening mammogram     V76.12/Z12.31  · Allergic rhinitis due to allergen     477.9/J30.9  · Anemia     285.9/D64.9  · Diabetes mellitus, type 2     250.00/E11.9  · Essential hypertension     401.9/I10  · GERD (gastroesophageal reflux disease)     530.81/K21.9  · Hyperlipidemia     272.4/E78.5  · Hypothyroidism     244.9/E03.9  · Lumbago     724.2/M54.5  · Tick bite       Bitten or stung by nonvenomous insect and other nonvenomous arthropods, initial encounter     919.4/W57.XXXA  · Cellulitis     682.9/L03.90  · Neuropathy     355.9/G62.9      Plan  · Orders  o Screening Mammography; Bilateral 3D (26630, 22760, ) - V76.12/Z12.31 - 05/29/2020  o B12 Folate levels (B12FO) - 285.9/D64.9 - 05/29/2020  o Iron panel (iron, TIBC, transferrin saturation) (06480, 44075, 96564) - 285.9/D64.9 - 05/29/2020  o Diabetes 2 Panel (Urine Microalbumin, CMP, Lipid, A1c, ) [facility] (08024, 37435, 02691, 36075) - 250.00/E11.9 - 05/29/2020  o CBC with Auto Diff [facility] (63105) - 250.00/E11.9 - 05/29/2020  o Urinalysis with Reflex Microscopy if abnormal ([facility]) (50786) - 250.00/E11.9 - 05/29/2020  o Thyroid Profile (22595, 79337, THYII) - 250.00/E11.9 - 05/29/2020  o OPHTHALMOLOGY CONSULTATION (OPHTH) - 250.00/E11.9 - 05/29/2020  o PODIATRY CONSULTATION (PODIA) - 250.00/E11.9 - 05/29/2020  o ACO-39: Current medications updated and reviewed () - -  05/29/2020  · Medications  o doxycycline monohydrate 100 mg oral tablet   SIG: take 1 tablet (100 mg) by oral route 2 times per day for 14 days   DISP: (28) tablets with 0 refills  Prescribed on 05/29/2020     · Instructions  o Depression Screen completed and scanned into the EMR under the designated folder within the patient's documents.  o Patient was educated/instructed on their diagnosis, treatment and medications prior to discharge from the clinic today.  · Disposition  o will contact with diagnostics results  o FOLLOW UP PENDING RESULTS            Electronically Signed by: HANS Nguyen -Author on May 29, 2020 10:01:30 AM

## 2021-05-10 NOTE — H&P
History and Physical      Patient Name: Vickie Bull   Patient ID: [id number]   Sex: Female   YOB: 1955    Primary Care Provider: Nguyen MAXWELL   Referring Provider: Nguyen MAXWELL    Visit Date: June 18, 2020    Provider: Eric Ibanez DPM   Location: [facility]   Location Address: [address] [place] [address]  [id number]   Location Phone: [phone]          Chief Complaint  · Diabetic Foot Evaluation      History Of Present Illness  Vickie Bull presents to the office today as a new patient for a diabetic foot evaluation. On referral from Nguyen MAXWELL   Patient reports that she is a diabetic currently controlling diabetes with oral medication      New, Established, New Problem: new   Location: bilateral feet  Duration: early 2020  Onset: gradual  Nature: NIDDM  Stable, worsening, improving: stable  Aggravating factors:  Previous Treatment: oral medication    Patient denies any fevers, chills, nausea, vomiting, shortness of breathe, nor any other constitutional signs nor symptoms.       Past Medical History  Allergic rhinitis, chronic; Anemia, Unspecified; Corns and callus; Diabetes; Diabetes, unspecified; Diarrhea; GERD; High blood pressure; High cholesterol; Hyperlipemia; Hypertension; Hypothyroidism; Ingrown toenail; Lumbar disc herniation, L3-4; Numbness in feet; Pain, Leg; Pain, Lumbar; Reflux; Sciatica; Seasonal allergies; Stomach Ulcer; Thyroid disorder; Ulcer         Past Surgical History  Colonoscopy; EGD; Joint Surgery; shoulder repair; Shoulder surgery; Tubal ligation         Medication List  duloxetine 60 mg oral capsule,delayed release(DR/EC); gabapentin 600 mg oral tablet; Glucose monitor; Lipitor 20 mg Oral tablet; lisinopril 10 mg oral tablet; metformin 500 mg oral tablet extended release 24hr; omeprazole 20 mg oral capsule,delayed release(DR/EC); Synthroid 50 mcg oral tablet; test  "strips; Vitamin C 500 mg oral tablet; Vitamin D3 2,000 unit Oral capsule; zinc 50 mg oral tablet; Zyrtec 10 mg oral tablet         Allergy List  ampicillin; Keflex; Latex; Latex Exam Gloves; PENICILLINS       Allergies Reconciled  Family Medical History  Cancer, Unspecified; Diabetes, unspecified type; Heart Attack (MI); Prostate cancer; Family history of prostate cancer; Family history of lung cancer; Family history of diabetes mellitus (DM)         Social History  Alcohol (Current some day); Alcohol Use (Current some day); Caffeine (Current every day); lives with spouse; .; Recreational Drug Use (Never); Retired.; Second hand smoke exposure (Never); Tobacco (Never)         Review of Systems  · Constitutional  o Denies  o : fatigue, night sweats  · Eyes  o Denies  o : double vision, blurred vision  · HENT  o Denies  o : vertigo, recent head injury  · Cardiovascular  o Denies  o : chest pain, irregular heart beats  · Respiratory  o Denies  o : shortness of breath, productive cough  · Gastrointestinal  o Denies  o : nausea, vomiting  · Genitourinary  o Denies  o : dysuria, urinary retention  · Integument  o Denies  o : hair growth change, new skin lesions  · Neurologic  o * See HPI  · Musculoskeletal  o Denies  o : joint swelling, limitation of motion  · Endocrine  o Denies  o : cold intolerance, heat intolerance  · Heme-Lymph  o Denies  o : petechiae, lymph node enlargement or tenderness  · Allergic-Immunologic  o Denies  o : frequent illnesses      Vitals  Date Time BP Position Site L\R Cuff Size HR RR TEMP (F) WT  HT  BMI kg/m2 BSA m2 O2 Sat        06/18/2020 02:08 /68 Sitting    90 - R  97.5 223lbs 3oz 5'  4\" 38.31 2.14 95 %          Physical Examination  · Constitutional  o Appearance  o : overweight, well developed, no obvious deformities present  · Cardiovascular  o Peripheral Vascular System  o :   § Extremities  § : no edema noted  · Musculoskeletal  o Extremeties/Joint  o : Lower extremity " muscle strength and range of motion is equal and symmetrical bilaterally. The knees are noted to be in normal alignment. Ankle alignment and range of motion is normal and foot structure is normal. Subtalar, metatarsal and metatarsal-phalangeal joint range of motion is noted to be within normal limits. The digits of both feet are in normal alignment. The gait is normal.  · Left DM Foot Exam  o Sensation  o : [place]-Annette 5.07 monofilament diminished to all assessed areas. Sharp/dull sensation is decreased.   o Visual Inspection  o : Skin is noted to have normal texture and turgor, with no excrescences noted. The toenails are noted to be without disease  o Vascular  o : palpable dorsalis pedis and posterir tibialis pulses present, normal capillary refill  · Right DM Foot Exam  o Sensation  o : [place]-Annette 5.07 monofilament diminished to all assessed areas. Sharp/dull sensation is decreased.   o Visual Inspection  o : Skin is noted to have normal texture and turgor, with no excrescences noted. The toenails are noted to be without disease  o Vascular  o : palpable dorsalis pedis and posterir tibialis pulses present, normal capillary refill          Assessment  · Diabetes     250.00/E11.9  · Neuropathy     355.9/G62.9      Plan  · Orders  o Diabetic Foot (Motor and Sensory) Exam Completed [facility] (, , 2028F) - - 06/18/2020  · Medications  o Medications have been Reconciled  o Transition of Care or Provider Policy  · Instructions  o Patient is to return in one year for their Podiatric Diabetic Evaluation.   o I have discussed the findings of this evaluation with the patient. The discussion included a complete verbal explanation of any changes in the examination results, diagnosis, and the current treatment plan. A schedule for future care needs was explained. If any questions should arise after returning home, I have encouraged the patient to feel free to contact Dr. Ibanez. The patient states  understanding and agreement with this plan.   o Pt to monitor for problems and to contact Dr. Ibanez for follow-up should such signs occur. Patient states understanding and agreement with this plan.   o Diabetic foot exam performed and documented this date, compliant with CQM required standards. Detail of findings as noted in physical exam.Lower extremity Neuro exam for diabetic patient performed and documented this date, compliant with PQRS required standards. Detail of findings as noted in physical exam.Advised patient importance of good routine lower extremity hygiene. Advised patient importance of evaluating for intact skin and pain free nail borders. Advised patient to use mirror to evaluate plantar/ soles of feet for better visualization. Advised patient monitor and phone office to be seen if any cracking to skin, open lesions, painful nail borders or if nails become elongated prior to next visit. Advised patient importance of daily cleansing of lower extremities, followed by good skin cream to maintain normal hydration of skin. Also advised patient importance of close daily monitoring of blood sugar. Advised to regulate diet and medications to maintain control of blood sugar in optimal range. Contact primary care provider if difficulties maintaining blood sugar levels.Advised Patient of presence of Diabetes Mellitus condition. Advised Patient risk of progression and worsening or improvement, then return of condition. Will monitor condition for any change in future. Treat with most appropriate treatment pending status of condition.Counseled and advised patient extensively on nature and ramifications of diabetes. Standard instructions given to patient for good diabetic foot care and maintenance. Advised importance of careful monitoring to avoid break down and complications secondary to diabetes. Advised patient importance of strict maintenance of blood sugar control. Advised patient of possible ominous  results from neglect of condition,i.e.: amputation/ loss of digits, feet and legs, or even death.Patient states understands counseling, will monitor closely, continue good hygiene and routine diabetic foot care. Patient will contact office is questions or problems.   o Electronically Identified Patient Education Materials Provided Electronically  · Disposition  o Call or Return if symptoms worsen or persist.            Electronically Signed by: Eric Ibanez DPM -Author on June 18, 2020 02:32:56 PM

## 2021-05-13 NOTE — PROGRESS NOTES
Progress Note      Patient Name: Vickie Bull   Patient ID: [id number]   Sex: Female   YOB: 1955    Primary Care Provider: Nguyen MAXWELL   Referring Provider: Nguyen MAXWELL    Visit Date: September 30, 2020    Provider: HANS Nguyen   Location: [facility]   Location Address: [address] [place] [address]  [id number]   Location Phone: [phone]          Chief Complaint  · Acute visit- sore throat, severe headache, achy chest, body aches started last night      History Of Present Illness  Vickie Bull is a 64 year old /White female who presents for evaluation and treatment of:      Acute visit- sore throat, severe headache, achy chest, body aches started last night    c/o- woke up around 2am with sore throat, severe headache, her chest has felt achy, body aches, no appetite, started last night.     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    Numbness in feet --  --    Pain, Leg --  --    Pain, Lumbar --  --    Reflux --  --    Sciatica 11/22/2013 no sig structural compressive lesion.    Seasonal allergies --  --    Stomach Ulcer --  --    Thyroid disorder --  --    Ulcer --  --          Past Surgical History  Procedure Name Date Notes   Colonoscopy --  --    EGD --  --    Joint Surgery --  --    shoulder repair --  --    Shoulder  surgery  right   Tubal ligation --  --          Medication List  Name Date Started Instructions   clobetasol 0.05 % topical ointment 09/15/2020 apply a thin layer to the affected area(s) by topical route 3 times per day   duloxetine 60 mg oral capsule,delayed release(DR/EC)  take 1 capsule (60 mg) by oral route once daily   famotidine 40 mg oral tablet 08/26/2020 take 1 tablet (40 mg) by oral route every 12 hours for 30 days   gabapentin 600 mg oral tablet  take 1 tablet by oral route daily   Glucose monitor 06/02/2020 check blood sugar twice daily   Lipitor 20 mg Oral tablet  take 1 tablet (20 mg) by oral route once daily   lisinopril 10 mg oral tablet  take 1 tablet (10 mg) by oral route once daily   metformin 500 mg oral tablet extended release 24hr 05/29/2020 take 2 tablets (1,000 mg) by oral route once daily with the evening meal for 30 days   multivitamin oral tablet  --    Prilosec OTC 20 mg oral tablet,delayed release (DR/EC)  --    Synthroid 50 mcg oral tablet 05/29/2020 take 1 tablet (50 mcg) by oral route once daily for 90 days   test strips 06/02/2020 test blood sugar twice daily   Vitamin D3 2,000 unit Oral capsule  take 1 capsule by oral route daily   Wellbutrin  mg oral tablet extended release 24 hr 09/15/2020 take 1 tablet (150 mg) by oral route once daily for 90 days   zinc 50 mg oral tablet  take 1 tablet by oral route daily   Zyrtec 10 mg oral tablet  take 1 tablet (10 mg) by oral route once daily         Allergy List  Allergen Name Date Reaction Notes   ampicillin --  --  --    Keflex --  --  --    Latex --  --  --    Latex Exam Gloves --  --  --    PENICILLINS --  --  --        Allergies Reconciled  Family Medical History  Disease Name Relative/Age Notes   Cancer, Unspecified Mother/   Mother  Mother  Mother  Mother; Father   Diabetes, unspecified type Father/  Grandmother (paternal)/   Father; Grandmother (paternal); Aunt (paternal); Uncle (paternal)  Grandmother (paternal);  "Aunt (paternal)  Father   Heart Attack (MI)  --    Prostate cancer Father/   Father   Family history of prostate cancer Father/   --    Family history of lung cancer Mother/   --    Family history of diabetes mellitus (DM)  --          Social History  Finding Status Start/Stop Quantity Notes   Alcohol Current some day --/-- --  09/15/2020 - drinks rarely; wine and liquor   Alcohol Use Current some day --/-- --  rarely drinks  rarely drinks  rarely drinks, less than 1 drink per day, has been drinking for 31 or more years  rarely drinks, less than 1 drink per day   Caffeine Current every day --/-- --  drinks regularly; soft drinks; 3-4 times per day   lives with spouse --  --/-- --  --    . --  --/-- --  --    Recreational Drug Use Never --/-- --  no   Retired. --  --/-- --  --    Second hand smoke exposure Never --/-- --  no   Tobacco Never --/-- --  never a smoker  never smoker         Review of Systems  · Constitutional  o Admits  o : fatigue, body aches  o Denies  o : fever, chills  · Eyes  o Denies  o : changes in vision  · HENT  o Admits  o : ear pain, facial pain, nasal discharge, postnasal drainage, sore throat  · Cardiovascular  o Denies  o : chest Pain  · Respiratory  o Denies  o : frequent cough, shortness of breath  · Gastrointestinal  o Denies  o : abdominal pain, nausea, vomiting, constipation, diarrhea      Vitals  Date Time BP Position Site L\R Cuff Size HR RR TEMP (F) WT  HT  BMI kg/m2 BSA m2 O2 Sat FR L/min FiO2 HC       08/26/2020 09:29 AM       12  219lbs 4oz 5'  4\" 37.63 2.12       09/15/2020 01:57 /63 Sitting    72 - R  98.7 219lbs 8oz 5'  4\" 37.68 2.12 95 %  21%    09/30/2020 01:33 /63 Sitting    74 - R  99.4 218lbs 4oz 5'  4\" 37.46 2.11 95 %  21%          Physical Examination  · Constitutional  o Appearance  o : well-nourished, in no acute distress  · Eyes  o Conjunctivae  o : conjunctivae injected   o Sclerae  o : sclerae white  o Pupils and Irises  o : pupils equal and " round  o Eyelids/Ocular Adnexae  o : eyelid appearance normal, no exudates present  · Ears, Nose, Mouth and Throat  o Ears  o :   § External Ears  § : external auditory canal appearance within normal limits  § Otoscopic Examination  § : tympanic membrane appearance injected bilaterally  o Nose  o :   § External Nose  § : appearance normal  § Intranasal Exam  § : mucosa inflamed, sinuses non tender to palpation  § Nasopharynx  § : no discharge present  o Oral Cavity  o :   § Oral Mucosa  § : oral mucosa normal  § Lips  § : lip appearance normal  o Throat  o :   § Oropharynx  § : erythematous, PND  · Neck  o Inspection/Palpation  o : normal appearance, no masses or tenderness, trachea midline  · Respiratory  o Respiratory Effort  o : breathing unlabored  o Auscultation of Lungs  o : normal breath sounds throughout inspiration and expiration  · Cardiovascular  o Heart  o :   § Auscultation of Heart  § : regular rate and rhythm, no murmurs  o Peripheral Vascular System  o :   § Pedal Pulses  § : pulses 2 bilaterally  § Extremities  § : no clubbing or edema  · Lymphatic  o Neck  o : no lymphadenopathy present  · Skin and Subcutaneous Tissue  o General Inspection  o : pink, warm, dry           Results  · In-Office Procedures  o Lab procedure  § IOP - Rapid Strep (45883)   § Beta Strep Gp A Culture: Negative   § Internal Control Verified?: Yes   § IOP - Influenza A/B Test (91324)   § Influenza A: Negative   § Influenza B: Negative   § Internal Control Verified?: Yes       Assessment  · Cough     786.2/R05  · Fatigue     780.79/R53.83  · Headache     784.0/R51  · Body aches     780.96/R52  · Sore throat     462/J02.9      Plan  · Orders  o ACO-39: Current medications updated and reviewed (1159F, ) - - 09/30/2020  o ACO-14: Influenza immunization was not administered for reasons documented [facility] () - - 09/30/2020  · Medications  o Medications have been Reconciled  · Instructions  o Patient was educated/instructed on  their diagnosis, treatment and medications prior to discharge from the clinic today.  o Electronically Identified Patient Education Materials Provided Electronically  · Disposition  o Call or Return if symptoms worsen or persist.  o Follow up PRN            Electronically Signed by: HANS Nguyen -Author on September 30, 2020 02:29:44 PM

## 2021-05-13 NOTE — PROGRESS NOTES
Progress Note      Patient Name: Vickie Bull   Patient ID: [id number]   Sex: Female   YOB: 1955    Primary Care Provider: Nguyen MAXWELL   Referring Provider: Nguyen MAXWELL    Visit Date: October 5, 2020    Provider: Eric bIanez DPM   Location: [facility]   Location Address: [address] [place] [address]  [id number]   Location Phone: [phone]          Chief Complaint  · Diabetic Foot Evaluation     Callus       History Of Present Illness  Vickie Bull presents to the office today as a Follow-Up for a diabetic foot evaluation.   Patient reports that she is a diabetic currently controlling diabetes with oral medication      New, Established, New Problem:  new problem  Location:  hyperkeratotic lesion(s) on medial Right 1st metatarsal phalangeal joint  Duration:    Onset:  gradual  Nature:  sore  Stable, worsening, improving:  worsening  Aggravating factors:  Patient reports lesion(s) is painful with shoegear and ambulation.    Previous Treatment:  self debridement    Pt states their most recent blood glucose reading was 134.    Patient denies any fevers, chills, nausea, vomiting, shortness of breathe, nor any other constitutional signs nor symptoms.       Past Medical History  Allergic rhinitis, chronic; Anemia, Unspecified; Corns and callus; Diabetes; Diabetes, unspecified; Diarrhea; GERD; High blood pressure; High cholesterol; Hyperlipemia; Hypertension; Hypothyroidism; Ingrown toenail; Lumbar disc herniation, L3-4; Numbness in feet; Pain, Leg; Pain, Lumbar; Reflux; Sciatica; Seasonal allergies; Stomach Ulcer; Thyroid disorder; Ulcer         Past Surgical History  Colonoscopy; EGD; Joint Surgery; shoulder repair; Shoulder surgery; Tubal ligation         Medication List  clobetasol 0.05 % topical ointment; duloxetine 60 mg oral capsule,delayed release(DR/EC); famotidine 40 mg oral tablet; gabapentin 600 mg oral tablet; Glucose  "monitor; Lipitor 20 mg Oral tablet; lisinopril 10 mg oral tablet; metformin 500 mg oral tablet extended release 24hr; multivitamin oral tablet; Pepcid 20 mg oral tablet; Synthroid 50 mcg oral tablet; test strips; Vitamin D3 2,000 unit Oral capsule; Wellbutrin  mg oral tablet extended release 24 hr; zinc 50 mg oral tablet; Zyrtec 10 mg oral tablet         Allergy List  ampicillin; Keflex; Latex; Latex Exam Gloves; PENICILLINS       Allergies Reconciled  Family Medical History  Cancer, Unspecified; Diabetes, unspecified type; Heart Attack (MI); Prostate cancer; Family history of prostate cancer; Family history of lung cancer; Family history of diabetes mellitus (DM)         Social History  Alcohol (Current some day); Alcohol Use (Current some day); Caffeine (Current every day); lives with spouse; .; Recreational Drug Use (Never); Retired.; Second hand smoke exposure (Never); Tobacco (Never)         Review of Systems  · Constitutional  o Denies  o : fatigue, night sweats  · Eyes  o Denies  o : double vision, blurred vision  · HENT  o Denies  o : vertigo, recent head injury  · Cardiovascular  o Denies  o : chest pain, irregular heart beats  · Respiratory  o Denies  o : shortness of breath, productive cough  · Gastrointestinal  o Denies  o : nausea, vomiting  · Genitourinary  o Denies  o : dysuria, urinary retention  · Integument  o * See HPI  · Neurologic  o * See HPI  · Musculoskeletal  o Denies  o : joint swelling, limitation of motion  · Endocrine  o Denies  o : cold intolerance, heat intolerance  · Heme-Lymph  o Denies  o : petechiae, lymph node enlargement or tenderness  · Allergic-Immunologic  o Denies  o : frequent illnesses      Vitals  Date Time BP Position Site L\R Cuff Size HR RR TEMP (F) WT  HT  BMI kg/m2 BSA m2 O2 Sat FR L/min FiO2        10/05/2020 02:14 /65 Sitting    94 - R  97.6 218lbs 0oz 5'  4\" 37.42 2.11 98 %            Physical Examination  · Constitutional  o Appearance  o : " overweight, well developed, no obvious deformities present  · Cardiovascular  o Peripheral Vascular System  o :   § Pedal Pulses  § : 2+ and symmetrical  § Extremities  § : no edema noted  · Musculoskeletal  o Extremeties/Joint  o : Lower extremity muscle strength and range of motion is equal and symmetrical bilaterally. The knees are noted to be in normal alignment. Ankle alignment and range of motion is normal and foot structure is normal. Subtalar, metatarsal and metatarsal-phalangeal joint range of motion is noted to be within normal limits. The digits of both feet are in normal alignment. The gait is normal.  · Skin and Subcutaneous Tissue  o Extremities  o :   § Right Lower Extremity  § : Grade 1 ulceration on the medial 1st metatarsal phalangeal joint area of the foot. Hyperkeratotic tissue with subepidermal hemosiderin deposition is present. No surrounding, edema, erythema, lymphangitis, fluctuance, nor signs of infection. No drainage present. 18 mm x 5 mm x 3 mm.   · Left DM Foot Exam  o Sensation  o : [place]te 5.07 monofilament diminished to all assessed areas. Sharp/dull sensation is decreased.   o Visual Inspection  o : Skin is noted to have normal texture and turgor, with no excrescences noted. The toenails are noted to be without disease  o Vascular  o : palpable dorsalis pedis and posterir tibialis pulses present, normal capillary refill  · Right DM Foot Exam  o Sensation  o : [place]te 5.07 monofilament diminished to all assessed areas. Sharp/dull sensation is decreased.   o Visual Inspection  o : Skin is noted to have normal texture and turgor, with no excrescences noted. The toenails are noted to be without disease  o Vascular  o : palpable dorsalis pedis and posterir tibialis pulses present, normal capillary refill  · Procedures  o Debride Ulcer  o : This area was debrided via excisional partial thickness debridement with a 15 scalpel blade. Post debridement measurements were 12 mm  x 3 mm x 1 mm in depth.           Assessment  · Diabetes     250.00/E11.9  · Foot pain, right     729.5/M79.671  · Neuropathy     355.9/G62.9  · Decubitus ulcer of foot, stage 1       Pressure ulcer of other site, stage 1     707.09/L89.891      Plan  · Orders  o Diabetic Foot (Motor and Sensory) Exam Completed [facility] (, , 2028F) - - 10/05/2020  o Active debridement of wound 20 square centimeters or less (50646) - - 10/05/2020  · Medications  o ammonium lactate 12 % topical cream   SIG: apply to affected area(s) by topical route 2 times a day for 30 days   DISP: (1) Bottle with 11 refills  Prescribed on 10/05/2020     o Medications have been Reconciled  o Transition of Care or Provider Policy  · Instructions  o Patient is to return in one year for their Podiatric Diabetic Evaluation.   o I have discussed the findings of this evaluation with the patient. The discussion included a complete verbal explanation of any changes in the examination results, diagnosis, and the current treatment plan. A schedule for future care needs was explained. If any questions should arise after returning home, I have encouraged the patient to feel free to contact Dr. Ibanez. The patient states understanding and agreement with this plan.   o Pt to monitor for problems and to contact Dr. Ibanez for follow-up should such signs occur. Patient states understanding and agreement with this plan.   o Diabetic foot exam performed and documented this date, compliant with CQM required standards. Detail of findings as noted in physical exam.Lower extremity Neuro exam for diabetic patient performed and documented this date, compliant with PQRS required standards. Detail of findings as noted in physical exam.Advised patient importance of good routine lower extremity hygiene. Advised patient importance of evaluating for intact skin and pain free nail borders. Advised patient to use mirror to evaluate plantar/ soles of feet for better  visualization. Advised patient monitor and phone office to be seen if any cracking to skin, open lesions, painful nail borders or if nails become elongated prior to next visit. Advised patient importance of daily cleansing of lower extremities, followed by good skin cream to maintain normal hydration of skin. Also advised patient importance of close daily monitoring of blood sugar. Advised to regulate diet and medications to maintain control of blood sugar in optimal range. Contact primary care provider if difficulties maintaining blood sugar levels.Advised Patient of presence of Diabetes Mellitus condition. Advised Patient risk of progression and worsening or improvement, then return of condition. Will monitor condition for any change in future. Treat with most appropriate treatment pending status of condition.Counseled and advised patient extensively on nature and ramifications of diabetes. Standard instructions given to patient for good diabetic foot care and maintenance. Advised importance of careful monitoring to avoid break down and complications secondary to diabetes. Advised patient importance of strict maintenance of blood sugar control. Advised patient of possible ominous results from neglect of condition,i.e.: amputation/ loss of digits, feet and legs, or even death.Patient states understands counseling, will monitor closely, continue good hygiene and routine diabetic foot care. Patient will contact office is questions or problems.   o Electronically Identified Patient Education Materials Provided Electronically  · Disposition  o Call or Return if symptoms worsen or persist.            Electronically Signed by: Eric Ibanez DPM -Author on October 5, 2020 02:42:41 PM

## 2021-05-13 NOTE — PROGRESS NOTES
Progress Note      Patient Name: Vickie Bull   Patient ID: [id number]   Sex: Female   YOB: 1955    Primary Care Provider: Nguyen MAXWELL   Referring Provider: Nguyen MAXWELL    Visit Date: June 12, 2020    Provider: HANS Nguyen   Location: [facility]   Location Address: [address] [place] [address]  [id number]   Location Phone: [phone]          Chief Complaint  · follow up DM2, GERD, and hypothyroiidsm  · lab results       History Of Present Illness  Vickie Bull is a 64 year old /White female who presents for evaluation and treatment of:      Follow-up on DM2, gerd, and hypothyroidism  lab results    Fasting blood sugars are between 110 and 135 typically  Did not notice much of a change on the metformin xr 1,000 qpm  Non fasting are about the same      pt c.o uncontrolled reflux with omeprazole  dr green 1.2019  requests to return to dr green  last EGD 2003 with dr arreguin history of gastritis and ulcer     picked up the synthroid 50 mcg po daily started taking about 7-10 days prior       Past Medical History  Disease Name Date Onset Notes   Allergic rhinitis, chronic --  --    Anemia, Unspecified --  --    Diabetes --  --    Diabetes, unspecified --  --    Diarrhea 04/15/2014 --    GERD --  --    High blood pressure --  --    High cholesterol --  --    Hyperlipemia --  --    Hypertension --  --    Hypothyroidism --  --    Lumbar disc herniation, L3-4 11/22/2013 very small far lat disc.    Pain, Leg --  --    Pain, Lumbar --  --    Reflux --  --    Sciatica 11/22/2013 no sig structural compressive lesion.    Seasonal allergies --  --    Stomach Ulcer --  --    Thyroid disorder --  --    Ulcer --  --          Past Surgical History  Procedure Name Date Notes   Colonoscopy --  --    EGD --  --    Joint Surgery --  --    shoulder repair --  --    Shoulder surgery  right   Tubal ligation --  --           Medication List  Name Date Started Instructions   alclometasone 0.05 % topical cream  --    Cymbalta oral  --    doxycycline monohydrate 100 mg oral tablet 05/29/2020 take 1 tablet (100 mg) by oral route 2 times per day for 14 days   Glucose monitor 06/02/2020 check blood sugar twice daily   Lipitor 20 mg Oral tablet  take 1 tablet (20 mg) by oral route once daily   lisinopril 10 mg oral tablet  --    metformin 500 mg oral tablet extended release 24hr 05/29/2020 take 2 tablets (1,000 mg) by oral route once daily with the evening meal for 30 days   Neurontin 300 mg Oral capsule  take 1 capsule by oral route once a day (at bedtime)   omeprazole 20 mg oral capsule,delayed release(DR/EC)  take 1 capsule (20 mg) by oral route once daily before a meal   Synthroid 50 mcg oral tablet 05/29/2020 take 1 tablet (50 mcg) by oral route once daily for 90 days   test strips 06/02/2020 test blood sugar twice daily   Vitamin D3 2,000 unit Oral capsule  take 1 capsule by oral route daily   Zyrtec 10 mg oral tablet  take 1 tablet (10 mg) by oral route once daily         Allergy List  Allergen Name Date Reaction Notes   ampicillin --  --  --    Keflex --  --  --    Latex --  --  --    Latex Exam Gloves --  --  --    PENICILLINS --  --  --          Family Medical History  Disease Name Relative/Age Notes   Cancer, Unspecified Mother/   Mother  Mother  Mother  Mother; Father   Diabetes, unspecified type Grandmother (paternal)/   Grandmother (paternal); Aunt (paternal)  Father   Heart Attack (MI)  --    Prostate cancer Father/   Father         Social History  Finding Status Start/Stop Quantity Notes   Alcohol Current some day --/-- --  drinks rarely; wine and liquor   Alcohol Use Current some day --/-- --  rarely drinks  rarely drinks  rarely drinks, less than 1 drink per day, has been drinking for 31 or more years  rarely drinks, less than 1 drink per day   Caffeine Current every day --/-- --  drinks regularly; soft drinks;  "3-4 times per day   lives with spouse --  --/-- --  --    . --  --/-- --  --    Recreational Drug Use Never --/-- --  no   Retired. --  --/-- --  --    Second hand smoke exposure Never --/-- --  no   Tobacco Never --/-- --  never a smoker  never smoker         Review of Systems  · Constitutional  o Denies  o : fatigue  · Eyes  o Denies  o : blurred vision, changes in vision  · HENT  o Denies  o : headaches  · Cardiovascular  o Denies  o : chest pain  · Respiratory  o Denies  o : cough  · Gastrointestinal  o Admits  o : heartburn, reflux  o Denies  o : nausea, vomiting, diarrhea, constipation, abdominal pain  · Genitourinary  o Denies  o : dysuria  · Integument  o Denies  o : rash      Vitals  Date Time BP Position Site L\R Cuff Size HR RR TEMP (F) WT  HT  BMI kg/m2 BSA m2 O2 Sat HC       11/06/2018 02:23 PM       16  220lbs 6oz 5'  4\" 37.83 2.12     05/29/2020 09:14 /56 Sitting    86 - R   222lbs 6oz 5'  4\" 38.17 2.13 94 %    06/12/2020 02:00 /57 Sitting    90 - R  97 221lbs 6oz 5'  4\" 38 2.13 96 %          Physical Examination  · Constitutional  o Appearance  o : well-nourished, in no acute distress  · Eyes  o Conjunctivae  o : conjunctivae normal  o Sclerae  o : sclerae white  o Pupils and Irises  o : pupils equal and round  o Eyelids/Ocular Adnexae  o : eyelid appearance normal, no exudates present  · Neck  o Inspection/Palpation  o : normal appearance, no masses or tenderness, trachea midline  o Thyroid  o : gland size normal, nontender  · Respiratory  o Respiratory Effort  o : breathing unlabored  o Inspection of Chest  o : normal appearance  o Auscultation of Lungs  o : normal breath sounds throughout inspiration and expiration  · Cardiovascular  o Heart  o :   § Auscultation of Heart  § : regular rate and rhythm, no murmurs  o Peripheral Vascular System  o :   § Carotid Arteries  § : no bruits present  § Pedal Pulses  § : pulses 2 bilaterally  § Extremities  § : no clubbing or " edema  · Gastrointestinal  o Abdominal Examination  o : abdomen nontender to palpation, tone normal without rigidity or guarding, no masses present, bowel sounds present   · Lymphatic  o Neck  o : no lymphadenopathy present  · Skin and Subcutaneous Tissue  o General Inspection  o : pink, warm, dry  · Neurologic  o Mental Status Examination  o :   § Orientation  § : grossly oriented to person, place and time  o Gait and Station  o : normal gait, able to stand without difficulty  · Psychiatric  o Judgement and Insight  o : judgment and insight intact  o Mood and Affect  o : mood normal, affect appropriate          Assessment  · Type 2 diabetes mellitus with other specified complication, without long-term current use of insulin     250.80/E11.69  continue current medications and start exercising 30 mins daily   · GERD (gastroesophageal reflux disease)     530.81/K21.9  continue omeprazole and refer for EGD   · Hypothyroidism     244.9/E03.9  will recheck thyroid profile in 4 weeks      Plan  · Orders  o Diabetes 2 Panel (Urine Microalbumin, CMP, Lipid, A1c, ) [facility] (03482, 61979, 74681, 68874) - - 09/12/2020  o CBC with Auto Diff [facility] (01482) - - 09/12/2020  o Urinalysis with Reflex Microscopy if abnormal ([facility]) (59391) - - 09/12/2020  o Thyroid Profile (47558, 95838, THYII) - - 09/12/2020  o General Surgery Consult ([facility]) - 530.81/K21.9 - 06/12/2020   dr green   o Thyroid Profile (85840, 61452, THYII) - 244.9/E03.9 - 07/12/2020  o ACO-39: Current medications updated and reviewed () - - 06/12/2020  · Instructions  o Continue blood sugar monitoring daily and record. Bring your log to office visits. Call the office for readings below 70 and above 250 or any complications.  o Daily foot care. Avoid walking barefoot. Annual Dilated Eye Exam.  o Discussed with patient blood pressure monitoring, hemoglobin A1C levels need to be below 7.0, and LDL (Lipid) goals below 70.  o Patient was educated/instructed on their  diagnosis, treatment and medications prior to discharge from the clinic today.  · Disposition  o Follow Up in Office in 3 months or sooner if needed  o obtain labs prior to follow up appt            Electronically Signed by: HANS Nguyen -Author on June 12, 2020 02:51:22 PM

## 2021-05-13 NOTE — PROGRESS NOTES
Progress Note      Patient Name: Vickie Bull   Patient ID: [id number]   Sex: Female   YOB: 1955    Primary Care Provider: Nguyen MAXWELL   Referring Provider: Nguyen MAXWELL    Visit Date: September 15, 2020    Provider: HANS Nguyen   Location: [facility]   Location Address: [address] [place] [address]  [id number]   Location Phone: [phone]          Chief Complaint  · 3 month follow up DM2, GERD, HTN, hyperlipidemia and hypothyroidism   · lab refills       History Of Present Illness  Vickie Bull is a 64 year old /White female who presents for evaluation and treatment of:      3 month follow up DM2, GERD, HTN, hyperlipidemia and hypothyroidism   review labs    Checking BS weekly  when she had been checking her BS it was in the 150's  within the last week or 2 her BS has been in 130's    pt c/o Dermatitis spots on scalp, ears and forehead     Colonoscopy- 2019  last Pap 2018 Dr Miller  [facility]  EGD 8.10.2020 changed to Pepcid     pt c.o depression slightly worse at this time due to covid restrictions,  pt denies si.hi       Past Medical History  Disease Name Date Onset Notes   Allergic rhinitis, chronic --  --    Anemia, Unspecified --  --    Corns and callus --  --    Diabetes --  --    Diabetes, unspecified --  --    Diarrhea 04/15/2014 --    GERD --  --    High blood pressure --  --    High cholesterol --  --    Hyperlipemia --  --    Hypertension --  --    Hypothyroidism --  --    Ingrown toenail --  --    Lumbar disc herniation, L3-4 11/22/2013 very small far lat disc.    Numbness in feet --  --    Pain, Leg --  --    Pain, Lumbar --  --    Reflux --  --    Sciatica 11/22/2013 no sig structural compressive lesion.    Seasonal allergies --  --    Stomach Ulcer --  --    Thyroid disorder --  --    Ulcer --  --          Past Surgical History  Procedure Name  Date Notes   Colonoscopy --  --    EGD --  --    Joint Surgery --  --    shoulder repair --  --    Shoulder surgery  right   Tubal ligation --  --          Medication List  Name Date Started Instructions   duloxetine 60 mg oral capsule,delayed release(DR/EC)  take 1 capsule (60 mg) by oral route once daily   famotidine 40 mg oral tablet 08/26/2020 take 1 tablet (40 mg) by oral route every 12 hours for 30 days   gabapentin 600 mg oral tablet  take 1 tablet by oral route daily   Glucose monitor 06/02/2020 check blood sugar twice daily   Lipitor 20 mg Oral tablet  take 1 tablet (20 mg) by oral route once daily   lisinopril 10 mg oral tablet  take 1 tablet (10 mg) by oral route once daily   metformin 500 mg oral tablet extended release 24hr 05/29/2020 take 2 tablets (1,000 mg) by oral route once daily with the evening meal for 30 days   multivitamin oral tablet  --    Prilosec OTC 20 mg oral tablet,delayed release (DR/EC)  --    Synthroid 50 mcg oral tablet 05/29/2020 take 1 tablet (50 mcg) by oral route once daily for 90 days   test strips 06/02/2020 test blood sugar twice daily   Vitamin D3 2,000 unit Oral capsule  take 1 capsule by oral route daily   zinc 50 mg oral tablet  take 1 tablet by oral route daily   Zyrtec 10 mg oral tablet  take 1 tablet (10 mg) by oral route once daily         Allergy List  Allergen Name Date Reaction Notes   ampicillin --  --  --    Keflex --  --  --    Latex --  --  --    Latex Exam Gloves --  --  --    PENICILLINS --  --  --          Family Medical History  Disease Name Relative/Age Notes   Cancer, Unspecified Mother/   Mother  Mother  Mother  Mother; Father   Diabetes, unspecified type Father/  Grandmother (paternal)/   Father; Grandmother (paternal); Aunt (paternal); Uncle (paternal)  Grandmother (paternal); Aunt (paternal)  Father   Heart Attack (MI)  --    Prostate cancer Father/   Father   Family history of prostate cancer Father/   --    Family history of lung cancer  "Mother/   --    Family history of diabetes mellitus (DM)  --          Social History  Finding Status Start/Stop Quantity Notes   Alcohol Current some day --/-- --  09/15/2020 - drinks rarely; wine and liquor   Alcohol Use Current some day --/-- --  rarely drinks  rarely drinks  rarely drinks, less than 1 drink per day, has been drinking for 31 or more years  rarely drinks, less than 1 drink per day   Caffeine Current every day --/-- --  drinks regularly; soft drinks; 3-4 times per day   lives with spouse --  --/-- --  --    . --  --/-- --  --    Recreational Drug Use Never --/-- --  no   Retired. --  --/-- --  --    Second hand smoke exposure Never --/-- --  no   Tobacco Never --/-- --  never a smoker  never smoker         Review of Systems  · Constitutional  o Denies  o : fever, chills  · Eyes  o Denies  o : discharge from eye, changes in vision  · HENT  o Admits  o : headaches  · Cardiovascular  o Denies  o : chest pain  · Respiratory  o Denies  o : cough  · Gastrointestinal  o Denies  o : nausea, vomiting, diarrhea, constipation, abdominal pain  · Genitourinary  o Denies  o : dysuria  · Musculoskeletal  o Admits  o : joint pain  o Denies  o : muscle pain  · Endocrine  o Admits  o : central obesity  o Denies  o : polyuria, polydipsia  · Psychiatric  o Admits  o : depression  · Allergic-Immunologic  o Denies  o : sinus allergy symptoms      Vitals  Date Time BP Position Site L\R Cuff Size HR RR TEMP (F) WT  HT  BMI kg/m2 BSA m2 O2 Sat        06/26/2020 10:31 AM       16  120lbs 16oz 5'  4\" 20.77 1.57     08/26/2020 09:29 AM       12  219lbs 4oz 5'  4\" 37.63 2.12     09/15/2020 01:57 /63 Sitting    72 - R  98.7 219lbs 8oz 5'  4\" 37.68 2.12 95 %          Physical Examination  · Constitutional  o Appearance  o : well-nourished, in no acute distress  · Eyes  o Conjunctivae  o : conjunctivae normal  o Sclerae  o : sclerae white  o Pupils and Irises  o : pupils equal and round  o Eyelids/Ocular " Adnexae  o : eyelid appearance normal, no exudates present  · Ears, Nose, Mouth and Throat  o Ears  o :   § External Ears  § : external auditory canal appearance within normal limits, no discharge present  § Otoscopic Examination  § : tympanic membrane appearance within normal limits bilaterally  o Nose  o :   § External Nose  § : appearance normal  § Intranasal Exam  § : mucosa within normal limits  § Nasopharynx  § : no discharge present  o Oral Cavity  o :   § Oral Mucosa  § : oral mucosa normal  o Throat  o :   § Oropharynx  § : no inflammation or lesions present, tonsils within normal limits  · Neck  o Inspection/Palpation  o : normal appearance, no masses or tenderness, trachea midline  o Thyroid  o : gland size normal, nontender  · Respiratory  o Respiratory Effort  o : breathing unlabored  o Inspection of Chest  o : normal appearance  o Auscultation of Lungs  o : normal breath sounds throughout inspiration and expiration  · Cardiovascular  o Heart  o :   § Auscultation of Heart  § : regular rate and rhythm, no murmurs  o Peripheral Vascular System  o :   § Carotid Arteries  § : no bruits present  § Pedal Pulses  § : pulses 2 bilaterally  § Extremities  § : no clubbing or edema  · Gastrointestinal  o Abdominal Examination  o : abdomen nontender to palpation, bowel sounds present   · Lymphatic  o Neck  o : no lymphadenopathy present  · Skin and Subcutaneous Tissue  o General Inspection  o : scaly plaque scalp and forehead  · Neurologic  o Mental Status Examination  o :   § Orientation  § : grossly oriented to person, place and time  o Gait and Station  o : normal gait, able to stand without difficulty  · Psychiatric  o Judgement and Insight  o : judgment and insight intact  o Mood and Affect  o : mood normal, affect appropriate          Assessment  · Screening for depression     V79.0/Z13.89  · Need for hepatitis C screening test     V73.89/Z11.59  · Post menopausal  syndrome     V49.81/Z78.0  · Depression     311/F32.9  continue cymblata. recommend therapy. pt declines at this time. will try Wellbutrin   · Dermatitis     692.9/L30.9  · Type 2 diabetes mellitus with other specified complication, without long-term current use of insulin     250.80/E11.69  currently controlled   · GERD (gastroesophageal reflux disease)     530.81/K21.9  currently controlled   · Hyperlipidemia     272.4/E78.5  LDL not at goal, continue statin, increase exercise, weight loss   · Hypothyroidism     244.9/E03.9  tsh normal, stable on current dose   · Hyperkalemia     276.7/E87.5  will recheck today       Plan  · Orders  o ACO-18: Positive screen for clinical depression using a standardized tool and a follow-up plan documented () - V79.0/Z13.89 - 09/15/2020  o Hepatitis C antibody MEDICARE screening [facility] (, 16868) - V73.89/Z11.59 - 03/15/2021  o DEXA Bone Density, 1 or more sites, axial skeleton [facility] (24992) - V49.81/Z78.0 - 09/15/2020  o Diabetes 2 Panel (Urine Microalbumin, CMP, Lipid, A1c, ) [facility] (85874, 15366, 61202, 30782) - - 03/15/2021  o CBC with Auto Diff [facility] (19786) - - 03/15/2021  o Urinalysis with Reflex Microscopy if abnormal ([facility]) (14898) - - 03/15/2021  o Thyroid Profile (17776, 30130, THYII) - - 03/15/2021  o OPHTHALMOLOGY CONSULTATION (OPHTH) - - 09/15/2020   [facility]   o CMP [facility] (81307) - 272.4/E78.5 - 09/15/2020  o ACO - Pt declines to or was not able to provide an Advance Care Plan or name a Surrogate Decision Maker (1124F) - - 09/15/2020  o ACO-39: Current medications updated and reviewed () - - 09/15/2020  o ACO-18: Positive screen for clinical depression using a standardized tool and a follow-up plan documented () - - 09/15/2020  o ACO-14: Influenza immunization was not administered for reasons documented () - - 09/15/2020  o ACO-19: Colorectal cancer screening results documented and reviewed (3017F) - -  09/15/2020  · Medications  o clobetasol 0.05 % topical ointment   SIG: apply a thin layer to the affected area(s) by topical route 3 times per day   DISP: (1) 60 gm tube with 1 refills  Prescribed on 09/15/2020     o Wellbutrin  mg oral tablet extended release 24 hr   SIG: take 1 tablet (150 mg) by oral route once daily for 90 days   DISP: (90) tablets with 1 refills  Prescribed on 09/15/2020     o Vitamin C 500 mg oral tablet   SIG: take 1 tablet by oral route daily   DISP: (0) tablet with 0 refills  Discontinued on 09/15/2020     · Instructions  o Depression Screen completed and scanned into the EMR under the designated folder within the patient's documents.  o Today's PHQ-9 result is __9_  o Medicare suggests a once in a lifetime screening for Hepatitis C for all Medicare beneficiaries born between [birth year]94-7913.  o Stop taking calcium supplements for at least 48 hours prior to your exam. Failure to stop supplements could alter results, and the radiologists will require you to reschedule your test.  o Continue blood sugar monitoring daily and record. Bring your log to office visits. Call the office for readings below 70 and above 250 or any complications.  o Daily foot care. Avoid walking barefoot. Annual Dilated Eye Exam.  o Discussed with patient blood pressure monitoring, hemoglobin A1C levels need to be below 7.0, and LDL (Lipid) goals below 70.  o Patient was educated/instructed on their diagnosis, treatment and medications prior to discharge from the clinic today.  · Disposition  o Call or Return if symptoms worsen or persist.  o Follow Up in Office in 6 months or sooner if needed  o will contact with diagnostics results            Electronically Signed by: HANS Nguyen -Author on September 15, 2020 02:34:02 PM

## 2021-05-14 VITALS
BODY MASS INDEX: 37.39 KG/M2 | SYSTOLIC BLOOD PRESSURE: 102 MMHG | WEIGHT: 219 LBS | HEART RATE: 90 BPM | TEMPERATURE: 97.1 F | HEIGHT: 64 IN | DIASTOLIC BLOOD PRESSURE: 58 MMHG | OXYGEN SATURATION: 96 % | RESPIRATION RATE: 18 BRPM

## 2021-05-14 VITALS — WEIGHT: 219.25 LBS | HEIGHT: 64 IN | RESPIRATION RATE: 12 BRPM | BODY MASS INDEX: 37.43 KG/M2

## 2021-05-14 VITALS
DIASTOLIC BLOOD PRESSURE: 51 MMHG | BODY MASS INDEX: 36.92 KG/M2 | HEART RATE: 91 BPM | SYSTOLIC BLOOD PRESSURE: 100 MMHG | HEIGHT: 64 IN | OXYGEN SATURATION: 94 % | TEMPERATURE: 98.1 F | RESPIRATION RATE: 18 BRPM | WEIGHT: 216.25 LBS

## 2021-05-14 VITALS
TEMPERATURE: 97.6 F | DIASTOLIC BLOOD PRESSURE: 65 MMHG | OXYGEN SATURATION: 98 % | WEIGHT: 218 LBS | HEART RATE: 94 BPM | HEIGHT: 64 IN | BODY MASS INDEX: 37.22 KG/M2 | SYSTOLIC BLOOD PRESSURE: 140 MMHG

## 2021-05-14 VITALS
HEIGHT: 64 IN | DIASTOLIC BLOOD PRESSURE: 63 MMHG | TEMPERATURE: 98.7 F | WEIGHT: 219.5 LBS | OXYGEN SATURATION: 95 % | HEART RATE: 72 BPM | SYSTOLIC BLOOD PRESSURE: 131 MMHG | BODY MASS INDEX: 37.47 KG/M2

## 2021-05-14 VITALS
OXYGEN SATURATION: 95 % | WEIGHT: 218.25 LBS | HEART RATE: 74 BPM | BODY MASS INDEX: 37.26 KG/M2 | DIASTOLIC BLOOD PRESSURE: 63 MMHG | HEIGHT: 64 IN | TEMPERATURE: 99.4 F | SYSTOLIC BLOOD PRESSURE: 125 MMHG

## 2021-05-14 NOTE — PROGRESS NOTES
Progress Note      Patient Name: Vickie Bull   Patient ID: [id number]   Sex: Female   YOB: 1955    Primary Care Provider: Nguyen MAXWELL   Referring Provider: Nguyen MAXWELL    Visit Date: January 21, 2021    Provider: HANS Nguyen   Location: [facility]   Location Address: [address] [place] [address]  [id number]   Location Phone: [phone]          Chief Complaint  · acute visit for rosacea       History Of Present Illness  Vickie Bull is a 65 year old /White female who presents for evaluation and treatment of:      acute visit for rosacea     pt states she was dx 5-6  years prior   c/o- having rosacea on cheek area for at least 6.  tried otc treatments with no relief   Has little bumps on face each morning that has a small amount of watery white puss in them that she easily pops then goes away.                   Past Medical History  Disease Name Date Onset Notes   Allergic rhinitis, chronic --  --    Anemia, Unspecified --  --    Corns and callus --  --    Diabetes --  --    Diabetes, unspecified --  --    Diarrhea 04/15/2014 --    GERD --  --    High blood pressure --  --    High cholesterol --  --    Hyperlipemia --  --    Hypertension --  --    Hypothyroidism --  --    Ingrown toenail --  --    Lumbar disc herniation, L3-4 11/22/2013 very small far lat disc.    Numbness in feet --  --    Pain, Leg --  --    Pain, Lumbar --  --    Reflux --  --    Sciatica 11/22/2013 no sig structural compressive lesion.    Seasonal allergies --  --    Stomach Ulcer --  --    Thyroid disorder --  --    Ulcer --  --          Past Surgical History  Procedure Name Date Notes   Colonoscopy --  --    EGD --  --    Joint Surgery --  --    shoulder repair --  --    Shoulder surgery  right   Tubal ligation --  --          Medication List  Name Date Started Instructions   ammonium lactate 12 % topical cream  10/05/2020 apply to affected area(s) by topical route 2 times a day for 30 days   Calcium 600 600 mg calcium (1,500 mg) oral tablet 11/30/2020 take 2 tablets by oral route daily for 90 days   clobetasol 0.05 % topical ointment 09/15/2020 apply a thin layer to the affected area(s) by topical route 3 times per day   duloxetine 60 mg oral capsule,delayed release(DR/EC)  take 1 capsule (60 mg) by oral route once daily   elderberry fruit and flower 460-115 mg oral capsule  take 1 capsule by oral route daily   famotidine 40 mg oral tablet 08/26/2020 take 1 tablet (40 mg) by oral route every 12 hours for 30 days   gabapentin 600 mg oral tablet  take 1 tablet by oral route daily   Glucose monitor 06/02/2020 check blood sugar twice daily   Lipitor 20 mg Oral tablet  take 1 tablet (20 mg) by oral route once daily   lisinopril 10 mg oral tablet  take 1 tablet (10 mg) by oral route once daily   metformin 500 mg oral tablet extended release 24hr 11/18/2020 take 2 tablets (1,000 mg) by oral route once daily with the evening meal for 90 days   multivitamin oral tablet  --    Pepcid 20 mg oral tablet  take 1 tablet (20 mg) by oral route 2 times per day   Synthroid 50 mcg oral tablet 11/18/2020 take 1 tablet (50 mcg) by oral route once daily for 90 days   test strips 06/02/2020 test blood sugar twice daily   Vitamin C 500 mg oral tablet  take 1 tablet by oral route daily   Vitamin D3 10 mcg (400 unit) oral capsule 11/30/2020 take 2 capsules by oral route daily for 90 days   Wellbutrin  mg oral tablet extended release 24 hr 09/15/2020 take 1 tablet (150 mg) by oral route once daily for 90 days   zinc 50 mg oral tablet  take 1 tablet by oral route daily   Zyrtec 10 mg oral tablet  take 1 tablet (10 mg) by oral route once daily         Allergy List  Allergen Name Date Reaction Notes   ampicillin --  --  --    Keflex --  --  --    Latex --  --  --    Latex Exam Gloves --  --  --    PENICILLINS --  --  --          Family Medical  "History  Disease Name Relative/Age Notes   Cancer, Unspecified Mother/   Mother  Mother  Mother  Mother; Father   Diabetes, unspecified type Father/  Grandmother (paternal)/   Father; Grandmother (paternal); Aunt (paternal); Uncle (paternal)  Grandmother (paternal); Aunt (paternal)  Father   Heart Attack (MI)  --    Prostate cancer Father/   Father   Family history of prostate cancer Father/   --    Family history of lung cancer Mother/   --    Family history of diabetes mellitus (DM)  --          Social History  Finding Status Start/Stop Quantity Notes   Alcohol Current some day --/-- --  09/15/2020 - drinks rarely; wine and liquor   Alcohol Use Current some day --/-- --  rarely drinks  rarely drinks  rarely drinks, less than 1 drink per day, has been drinking for 31 or more years  rarely drinks, less than 1 drink per day   Caffeine Current every day --/-- --  drinks regularly; soft drinks; 3-4 times per day   lives with spouse --  --/-- --  --    . --  --/-- --  --    Recreational Drug Use Never --/-- --  no   Retired. --  --/-- --  --    Second hand smoke exposure Never --/-- --  no   Tobacco Never --/-- --  never a smoker  never smoker         Review of Systems  · Constitutional  o Denies  o : fever, chills  · Eyes  o Denies  o : discharge from eye  · HENT  o Denies  o : ear pain, nasal discharge, sore throat  · Cardiovascular  o Denies  o : chest Pain  · Respiratory  o Denies  o : frequent cough  · Integument  o Admits  o : rash      Vitals  Date Time BP Position Site L\R Cuff Size HR RR TEMP (F) WT  HT  BMI kg/m2 BSA m2 O2 Sat FR L/min FiO2 HC       09/15/2020 01:57 /63 Sitting    72 - R  98.7 219lbs 8oz 5'  4\" 37.68 2.12 95 %  21%    09/30/2020 01:33 /63 Sitting    74 - R  99.4 218lbs 4oz 5'  4\" 37.46 2.11 95 %  21%    10/05/2020 02:14 /65 Sitting    94 - R  97.6 218lbs 0oz 5'  4\" 37.42 2.11 98 %      01/21/2021 02:01 /58 Sitting    90 - R 18 97.1 219lbs 0oz 5'  4\" " 37.59 2.12 96 %            Physical Examination  · Constitutional  o Appearance  o : well-nourished, in no acute distress  · Eyes  o Conjunctivae  o : conjunctivae normal  o Sclerae  o : sclerae white  · Neck  o Inspection/Palpation  o : normal appearance, trachea midline  · Respiratory  o Respiratory Effort  o : breathing unlabored  o Auscultation of Lungs  o : normal breath sounds throughout inspiration and expiration  · Cardiovascular  o Heart  o :   § Auscultation of Heart  § : regular rate and rhythm, no murmurs  o Peripheral Vascular System  o :   § Extremities  § : no clubbing or edema  · Lymphatic  o Neck  o : no lymphadenopathy present  · Skin and Subcutaneous Tissue  o General Inspection  o : erythematous pustular rash bilateral cheeks, nontender, no surrounding erythema  · Neurologic  o Gait and Station  o : normal gait, able to stand without difficulty          Assessment  · Rosacea     695.3/L71.9      Plan  · Orders  o ACO-39: Current medications updated and reviewed (, 1159F) - - 01/21/2021  · Medications  o metronidazole 1 % topical gel   SIG: apply to the affected area(s) by topical route once daily ; rub in gently and completely   DISP: (1) Bottle with 1 refills  Prescribed on 01/21/2021     o Medications have been Reconciled  o Transition of Care or Provider Policy  · Instructions  o Patient was educated/instructed on their diagnosis, treatment and medications prior to discharge from the clinic today.  · Disposition  o Call or Return if symptoms worsen or persist.  o Follow up PRN            Electronically Signed by: HANS Nguyen -Author on January 24, 2021 02:18:24 PM

## 2021-05-14 NOTE — PROGRESS NOTES
Progress Note      Patient Name: Vickie Bull   Patient ID: [id number]   Sex: Female   YOB: 1955    Primary Care Provider: Nguyen MAXWELL   Referring Provider: Nguyen MAXWELL    Visit Date: March 16, 2021    Provider: HANS Nguyen   Location: [facility]   Location Address: [address] [place] [address]  [id number]   Location Phone: [phone]          Chief Complaint  · 6 month follow up DM2, GERD, HTN, hyperlipidemia and hypothyroidism      History Of Present Illness  Vickie Bull is a 65 year old /White female who presents for evaluation and treatment of:      6 month follow up DM2, GERD, HTN, hyperlipidemia and hypothyroidism   medication refills  review labs    c/o- wants to discuss depression medication.  states Wellbutrin was not helpful in her depression   no current counseling   currently [facility] 60 mg at  night for neuropathy     mammo-07/2020  pap-2018 [facility]  colonoscopy- 1/4/2019  EGD-2020  eye-  12/2020 at [facility]   foot- 10/5/2020 Dr Ibanez     BS checked: Not often.       Past Medical History  Disease Name Date Onset Notes   Allergic rhinitis, chronic --  --    Anemia, Unspecified --  --    Corns and callus --  --    Diabetes --  --    Diabetes, unspecified --  --    Diarrhea 04/15/2014 --    GERD --  --    High blood pressure --  --    High cholesterol --  --    Hyperlipemia --  --    Hypertension --  --    Hypothyroidism --  --    Ingrown toenail --  --    Lumbar disc herniation, L3-4 11/22/2013 very small far lat disc.    Numbness in feet --  --    Pain, Leg --  --    Pain, Lumbar --  --    Reflux --  --    Sciatica 11/22/2013 no sig structural compressive lesion.    Seasonal allergies --  --    Stomach Ulcer --  --    Thyroid disorder --  --    Ulcer --  --          Past Surgical History  Procedure Name Date Notes   Colonoscopy --  --     EGD --  --    Joint Surgery --  --    shoulder repair --  --    Shoulder surgery  right   Tubal ligation --  --          Medication List  Name Date Started Instructions   ammonium lactate 12 % topical cream 10/05/2020 apply to affected area(s) by topical route 2 times a day for 30 days   Calcium 600 600 mg calcium (1,500 mg) oral tablet 11/30/2020 take 2 tablets by oral route daily for 90 days   clobetasol 0.05 % topical ointment 09/15/2020 apply a thin layer to the affected area(s) by topical route 3 times per day   duloxetine 60 mg oral capsule,delayed release(DR/EC)  take 1 capsule (60 mg) by oral route once daily   gabapentin 600 mg oral tablet  take 1 tablet by oral route daily   Glucose monitor 06/02/2020 check blood sugar twice daily   Lipitor 20 mg Oral tablet  take 1 tablet (20 mg) by oral route once daily   lisinopril 10 mg oral tablet  take 1 tablet (10 mg) by oral route once daily   metformin 500 mg oral tablet extended release 24hr 11/18/2020 take 2 tablets (1,000 mg) by oral route once daily with the evening meal for 90 days   metronidazole 1 % topical gel 01/21/2021 apply to the affected area(s) by topical route once daily ; rub in gently and completely   multivitamin oral tablet  --    Synthroid 50 mcg oral tablet 11/18/2020 take 1 tablet (50 mcg) by oral route once daily for 90 days   test strips 06/02/2020 test blood sugar twice daily   Vitamin C 500 mg oral tablet  take 1 tablet by oral route daily   Vitamin D3 10 mcg (400 unit) oral capsule 11/30/2020 take 2 capsules by oral route daily for 90 days   Wellbutrin  mg oral tablet extended release 24 hr 09/15/2020 take 1 tablet (150 mg) by oral route once daily for 90 days   zinc 50 mg oral tablet  take 1 tablet by oral route daily   Zyrtec 10 mg oral tablet  take 1 tablet (10 mg) by oral route once daily         Allergy List  Allergen Name Date Reaction Notes   ampicillin --  --  --    Keflex --  --  --    Latex --  --  --    Latex  Exam Gloves --  --  --    PENICILLINS --  --  --          Family Medical History  Disease Name Relative/Age Notes   Cancer, Unspecified Mother/   Mother  Mother  Mother  Mother; Father   Diabetes, unspecified type Father/  Grandmother (paternal)/   Father; Grandmother (paternal); Aunt (paternal); Uncle (paternal)  Grandmother (paternal); Aunt (paternal)  Father   Heart Attack (MI)  --    Prostate cancer Father/   Father   Family history of prostate cancer Father/   --    Family history of lung cancer Mother/   --    Family history of diabetes mellitus (DM)  --          Social History  Finding Status Start/Stop Quantity Notes   Alcohol Current some day --/-- --  09/15/2020 - drinks rarely; wine and liquor   Alcohol Use Current some day --/-- --  rarely drinks  rarely drinks  rarely drinks, less than 1 drink per day, has been drinking for 31 or more years  rarely drinks, less than 1 drink per day   Caffeine Current every day --/-- --  drinks regularly; soft drinks; 3-4 times per day   lives with spouse --  --/-- --  --    . --  --/-- --  --    Recreational Drug Use Never --/-- --  no   Retired. --  --/-- --  --    Second hand smoke exposure Never --/-- --  no   Tobacco Never --/-- --  never a smoker  never smoker         Review of Systems  · Constitutional  o Denies  o : fatigue, fever, weight gain, weight loss, chills  · Eyes  o Denies  o : changes in vision  · HENT  o Denies  o : ear pain, sore throat  · Cardiovascular  o Denies  o : chest Pain, palpitations, edema (swelling)  · Respiratory  o Denies  o : frequent cough, shortness of breath  · Gastrointestinal  o Denies  o : nausea, vomiting, changes in bowel habits  · Genitourinary  o Denies  o : dysuria, urinary frequency, urinary urgency, polyuria  · Integument  o Denies  o : rash  · Neurologic  o Denies  o : headache, tingling or numbness, dizziness  · Musculoskeletal  o Denies  o : joint pain, myalgias  · Endocrine  o Denies  o : polydipsia,  "polyphagia  · Psychiatric  o Denies  o : mood changes, memory changes, SI/HI  · Heme-Lymph  o Denies  o : easy bruising, easy bleeding, lymph node enlargement or tenderness  · Allergic-Immunologic  o Denies  o : eczema, seasonal allergies, urticaria      Vitals  Date Time BP Position Site L\R Cuff Size HR RR TEMP (F) WT  HT  BMI kg/m2 BSA m2 O2 Sat FR L/min FiO2 HC       10/05/2020 02:14 /65 Sitting    94 - R  97.6 218lbs 0oz 5'  4\" 37.42 2.11 98 %      01/21/2021 02:01 /58 Sitting    90 - R 18 97.1 219lbs 0oz 5'  4\" 37.59 2.12 96 %      03/16/2021 10:26 /51 Sitting    91 - R 18 98.1 216lbs 4oz 5'  4\" 37.12 2.1 94 %            Physical Examination  · Constitutional  o Appearance  o : well-nourished, in no acute distress  · Eyes  o Conjunctivae  o : conjunctivae normal  o Sclerae  o : sclerae white  o Pupils and Irises  o : pupils equal and round  o Eyelids/Ocular Adnexae  o : eyelid appearance normal, no exudates present  · Ears, Nose, Mouth and Throat  o Ears  o :   § External Ears  § : external auditory canal appearance within normal limits  § Otoscopic Examination  § : tympanic membrane appearance within normal limits bilaterally  o Nose  o :   § External Nose  § : appearance normal  § Intranasal Exam  § : mucosa within normal limits  § Nasopharynx  § : no discharge present  o Oral Cavity  o :   § Oral Mucosa  § : oral mucosa normal  o Throat  o :   § Oropharynx  § : no inflammation or lesions present, tonsils within normal limits  · Neck  o Inspection/Palpation  o : normal appearance, trachea midline  o Thyroid  o : gland size normal, nontender  · Respiratory  o Respiratory Effort  o : breathing unlabored  o Auscultation of Lungs  o : normal breath sounds throughout inspiration and expiration  · Cardiovascular  o Heart  o :   § Auscultation of Heart  § : regular rate and rhythm, no murmurs  o Peripheral Vascular System  o :   § Carotid Arteries  § : no bruits present  § Extremities  § : no " clubbing or edema  · Gastrointestinal  o Abdominal Examination  o : abdomen nontender to palpation, bowel sounds present   · Lymphatic  o Neck  o : no lymphadenopathy present  · Skin and Subcutaneous Tissue  o General Inspection  o : pink, warm ,dry   · Neurologic  o Mental Status Examination  o :   § Orientation  § : grossly oriented to person, place and time  o Gait and Station  o : normal gait, able to stand without difficulty  · Psychiatric  o Judgement and Insight  o : judgment and insight intact  o Mood and Affect  o : mood normal, affect appropriate          Assessment  · Depression     311/F32.9  will add 30 mg Cymbalta qam and continue 60 mg qpm  · Type 2 diabetes mellitus with other specified complication, without long-term current use of insulin     250.80/E11.69  currently controlled   · Essential hypertension     401.9/I10  currently controlled   · GERD (gastroesophageal reflux disease)     530.81/K21.9  currently controlled   · Hyperlipidemia     272.4/E78.5  stable on satin, recommended decrease cholesterol intake  · Hypothyroidism     244.9/E03.9  tsh stable on current dose   · Renal insufficiency     593.9/N28.9  avoid nsaids, increase water intake       Plan  · Orders  o Diabetes 2 Panel (Urine Microalbumin, CMP, Lipid, A1c, ) [facility] (62498, 64645, 32388, 79830) - - 03/16/2021  o CBC with Auto Diff [facility] (91689) - - 03/16/2021  o Urinalysis with Reflex Microscopy ([facility]) (85695) - - 03/16/2021  o Thyroid Profile (51043, 98300, THYII) - - 03/16/2021  o ACO-41: Dilated Diabetic eye exam completed this year and results in chart/reviewed (2022F) - - 03/16/2021  o ACO-39: Current medications updated and reviewed (1159F, ) - - 03/16/2021  · Medications  o Cymbalta 30 mg oral capsule,delayed release(DR/EC)   SIG: take 1 capsule (30 mg) by oral route once daily for 90 days   DISP: (90) Capsule with 1 refills  Prescribed on 03/16/2021     o Vitamin D3 10 mcg (400 unit) oral capsule   SIG: take 2 capsules by  oral route daily for 90 days   DISP: (180) Capsule with 1 refills  Adjusted on 03/16/2021     o metformin 500 mg oral tablet extended release 24hr   SIG: take 2 tablets (1,000 mg) by oral route once daily with the evening meal for 90 days   DISP: (180) Tablet with 1 refills  Refilled on 03/16/2021     o Synthroid 50 mcg oral tablet   SIG: take 1 tablet (50 mcg) by oral route once daily for 90 days   DISP: (90) Tablet with 1 refills  Refilled on 03/16/2021     o Wellbutrin  mg oral tablet extended release 24 hr   SIG: take 1 tablet (150 mg) by oral route once daily for 90 days   DISP: (90) tablets with 1 refills  Discontinued on 03/16/2021     o Medications have been Reconciled  o Transition of Care or Provider Policy  · Instructions  o Patient was educated/instructed on their diagnosis, treatment and medications prior to discharge from the clinic today.  · Disposition  o Follow Up in Office in 6 months or sooner if needed  o obtain labs prior to follow up appt            Electronically Signed by: HANS Nguyen -Author on March 16, 2021 10:55:36 AM

## 2021-05-15 VITALS
HEART RATE: 90 BPM | WEIGHT: 221.37 LBS | DIASTOLIC BLOOD PRESSURE: 57 MMHG | BODY MASS INDEX: 37.79 KG/M2 | TEMPERATURE: 97 F | SYSTOLIC BLOOD PRESSURE: 128 MMHG | OXYGEN SATURATION: 96 % | HEIGHT: 64 IN

## 2021-05-15 VITALS
SYSTOLIC BLOOD PRESSURE: 118 MMHG | BODY MASS INDEX: 38.1 KG/M2 | DIASTOLIC BLOOD PRESSURE: 68 MMHG | OXYGEN SATURATION: 95 % | HEART RATE: 90 BPM | WEIGHT: 223.19 LBS | HEIGHT: 64 IN | TEMPERATURE: 97.5 F

## 2021-05-15 VITALS
HEART RATE: 86 BPM | BODY MASS INDEX: 37.97 KG/M2 | HEIGHT: 64 IN | WEIGHT: 222.37 LBS | DIASTOLIC BLOOD PRESSURE: 56 MMHG | SYSTOLIC BLOOD PRESSURE: 132 MMHG | OXYGEN SATURATION: 94 %

## 2021-05-15 VITALS — HEIGHT: 64 IN | WEIGHT: 121 LBS | BODY MASS INDEX: 20.66 KG/M2 | RESPIRATION RATE: 16 BRPM

## 2021-05-16 VITALS — HEART RATE: 78 BPM | HEIGHT: 64 IN | WEIGHT: 202.31 LBS | BODY MASS INDEX: 34.54 KG/M2 | OXYGEN SATURATION: 98 %

## 2021-05-16 VITALS — WEIGHT: 220.37 LBS | RESPIRATION RATE: 16 BRPM | HEIGHT: 64 IN | BODY MASS INDEX: 37.62 KG/M2

## 2021-06-22 ENCOUNTER — OFFICE VISIT (OUTPATIENT)
Dept: PODIATRY | Facility: CLINIC | Age: 66
End: 2021-06-22

## 2021-06-22 VITALS
SYSTOLIC BLOOD PRESSURE: 135 MMHG | OXYGEN SATURATION: 97 % | BODY MASS INDEX: 37.39 KG/M2 | WEIGHT: 219 LBS | TEMPERATURE: 97.1 F | HEIGHT: 64 IN | DIASTOLIC BLOOD PRESSURE: 63 MMHG | HEART RATE: 77 BPM

## 2021-06-22 DIAGNOSIS — E11.8 DIABETIC FOOT (HCC): ICD-10-CM

## 2021-06-22 DIAGNOSIS — G62.9 NEUROPATHY: Primary | ICD-10-CM

## 2021-06-22 DIAGNOSIS — E11.9 NON-INSULIN DEPENDENT TYPE 2 DIABETES MELLITUS (HCC): ICD-10-CM

## 2021-06-22 PROCEDURE — 99213 OFFICE O/P EST LOW 20 MIN: CPT | Performed by: PODIATRIST

## 2021-06-22 PROCEDURE — G8404 LOW EXTEMITY NEUR EXAM DOCUM: HCPCS | Performed by: PODIATRIST

## 2021-06-22 RX ORDER — CETIRIZINE HYDROCHLORIDE 10 MG/1
TABLET ORAL
COMMUNITY

## 2021-06-22 RX ORDER — MULTIPLE VITAMINS W/ MINERALS TAB 9MG-400MCG
1 TAB ORAL DAILY
COMMUNITY

## 2021-06-22 RX ORDER — DULOXETIN HYDROCHLORIDE 60 MG/1
CAPSULE, DELAYED RELEASE ORAL
COMMUNITY
Start: 2021-02-02 | End: 2021-09-16 | Stop reason: SDUPTHER

## 2021-06-22 RX ORDER — LIDOCAINE 50 MG/G
1 PATCH TOPICAL AS NEEDED
COMMUNITY
Start: 2021-05-26 | End: 2021-09-16

## 2021-06-22 RX ORDER — DULOXETIN HYDROCHLORIDE 30 MG/1
30 CAPSULE, DELAYED RELEASE ORAL DAILY
COMMUNITY
Start: 2021-06-10 | End: 2021-09-16 | Stop reason: SDUPTHER

## 2021-06-22 RX ORDER — ATORVASTATIN CALCIUM 20 MG/1
TABLET, FILM COATED ORAL
COMMUNITY
End: 2021-09-16 | Stop reason: SDUPTHER

## 2021-06-22 RX ORDER — ASCORBIC ACID 500 MG
TABLET ORAL
COMMUNITY
End: 2022-12-16

## 2021-06-22 RX ORDER — GABAPENTIN 600 MG/1
TABLET ORAL
COMMUNITY
End: 2022-12-16

## 2021-06-22 RX ORDER — LEVOTHYROXINE SODIUM 0.05 MG/1
50 TABLET ORAL DAILY
COMMUNITY
Start: 2021-02-17 | End: 2021-09-16 | Stop reason: SDUPTHER

## 2021-06-22 RX ORDER — OMEPRAZOLE 20 MG/1
CAPSULE, DELAYED RELEASE ORAL
COMMUNITY
End: 2021-09-16 | Stop reason: SDUPTHER

## 2021-06-22 RX ORDER — LISINOPRIL 10 MG/1
10 TABLET ORAL DAILY
COMMUNITY
Start: 2021-05-25 | End: 2023-02-02 | Stop reason: SDUPTHER

## 2021-06-22 NOTE — PROGRESS NOTES
[facility] - PODIATRY    Today's Date: 06/22/21    Patient Name: Vickie Bull  MRN: [id number]  CSN: [id number]  PCP: Nguyen Ascencio APRN  Referring Provider: No ref. provider found    SUBJECTIVE     Chief Complaint   Patient presents with   • Left Foot - Diabetes, Follow-up, Numbness, Tingling, Pain   • Right Foot - Diabetes, Follow-up, Numbness, Tingling, Pain     HPI: Vickie Bull, a 65 y.o.female, comes presents to clinic for a diabetic foot evaluation.    New, Established, New Problem: Established    Location:      Duration:      Onset: Insidious    Nature: Well controlled    Stable, worsening, improving: Stable    Aggravating factors:  Patient relates pain is aggravated by shoe gear and ambulation.      Patient controlling diabetes via: NIDDM    Patient denies any fevers, chills, nausea, vomiting, shortness of breathe, nor any other constitutional signs nor symptoms.    No other pedal complaints at this time.    Patient states her last blood sugar at home was 140.    Patient states [facility].    Past Medical History:   Diagnosis Date   • Acid reflux    • Anemia, unspecified    • Chronic allergic rhinitis    • Condition not found     Ulcer   • Corns and callus    • Diabetes (CMS/HCC)     Unspecified   • Diarrhea 04/15/2014   • GERD (gastroesophageal reflux disease)    • HBP (high blood pressure)    • High cholesterol    • HTN (hypertension)    • Hyperlipemia    • Hypothyroidism    • Ingrown toenail    • Leg pain    • Lumbar disc herniation 11/22/2013    L3-4 very small fat lat disc   • Lumbar pain    • Numbness in feet    • Renal insufficiency 03/16/2021   • Sciatica 11/22/2013    no sig structural compressive lesion   • Seasonal allergies    • Stomach ulcer    • Thyroid disorder      Past Surgical History:   Procedure Laterality Date   • COLONOSCOPY     • ENDOSCOPY     • JOINT REPLACEMENT     •  SHOULDER ACROMIOCLAVICULAR JOINT REPAIR     • SHOULDER SURGERY Right 10/2010   • TUBAL ABDOMINAL LIGATION       Family History   Problem Relation Age of Onset   • Cancer Mother         unspecified   • Lung cancer Mother    • Diabetes Father         unspecified type   • Prostate cancer Father    • Diabetes Paternal Grandmother         unspecified type   • Heart attack Other    • Diabetes Other    • Diabetes Paternal Aunt         unspecified type   • Diabetes Paternal Uncle         unspecified type     Social History     Socioeconomic History   • Marital status:      Spouse name: Not on file   • Number of children: Not on file   • Years of education: Not on file   • Highest education level: Not on file   Tobacco Use   • Smoking status: Never Smoker   • Smokeless tobacco: Never Used   • Tobacco comment: second hand smoke exposure-never   Vaping Use   • Vaping Use: Never used   Substance and Sexual Activity   • Alcohol use: Yes     Comment: rarely; wine and liquor   • Drug use: Never   • Sexual activity: Defer     Allergies   Allergen Reactions   • Cephalexin Hives   • Latex Itching   • Penicillins Hives and Rash     Current Outpatient Medications   Medication Sig Dispense Refill   • ascorbic acid (VITAMIN C) 500 MG tablet Vitamin C 500 mg oral tablet take 1 tablet by oral route daily   Active     • atorvastatin (Lipitor) 20 MG tablet Lipitor 20 mg oral tablet take 1 tablet (20 mg) by oral route once daily   Active     • cetirizine (zyrTEC) 10 MG tablet cetirizine 10 mg oral tablet take 1 tablet (10 mg) by oral route once daily   Suspended     • Cholecalciferol 50 MCG (2000 UT) capsule Vitamin D3 2,000 unit oral capsule take 1 capsule by oral route daily   Suspended     • DULoxetine (CYMBALTA) 30 MG capsule Take 30 mg by mouth Daily.     • DULoxetine (CYMBALTA) 60 MG capsule duloxetine 60 mg oral capsule,delayed release(DR/EC) take 1 capsule (60 mg) by oral route once daily   Active     • gabapentin (NEURONTIN)  600 MG tablet gabapentin 600 mg oral tablet take 1 tablet by oral route daily   Active     • levothyroxine (Synthroid) 50 MCG tablet Take 50 mcg by mouth Daily.     • lidocaine (LIDODERM) 5 % Place 1 patch on the skin as directed by provider As Needed.     • lisinopril (PRINIVIL,ZESTRIL) 10 MG tablet Take 10 mg by mouth Daily.     • metFORMIN (GLUCOPHAGE) 1000 MG tablet Take 1,000 mg by mouth Daily With Breakfast.     • multivitamin with minerals (MULTIVITAMIN ADULT PO) 1 tablet Daily.     • omeprazole (priLOSEC) 20 MG capsule omeprazole 20 mg oral capsule,delayed release(DR/EC) take 1 capsule (20 mg) by oral route once daily before a meal   Suspended     • Zinc 50 MG capsule Take  by mouth Daily.       No current facility-administered medications for this visit.     Review of Systems   Constitutional: Negative.    Neurological: Positive for numbness.   All other systems reviewed and are negative.      OBJECTIVE     Vitals:    06/22/21 1406   BP: 135/63   Pulse: 77   Temp: 97.1 °F (36.2 °C)   SpO2: 97%       Body mass index is 37.59 kg/m².    Lab Results   Component Value Date    HGBA1C 6.9 (H) 03/11/2021       Lab Results   Component Value Date    CALCIUM 9.1 03/11/2021     03/11/2021    K 5.0 03/11/2021    CO2 27 03/11/2021     03/11/2021    BUN 18 03/11/2021    CREATININE 1.01 (H) 03/11/2021    BCR 18 03/11/2021    ANIONGAP 15 03/11/2021       Patient seen in no apparent distress.      PHYSICAL EXAM:     Foot/Ankle Exam:       General:   Diabetic Foot Exam Performed    Appearance: obesity    Orientation: AAOx3    Affect: appropriate    Gait: unimpaired    Shoe Gear:  Casual shoes    VASCULAR      Right Foot Vascularity   Normal vascular exam    Dorsalis pedis:  2+  Posterior tibial:  2+  Skin Temperature: warm    Edema Grading:  None  CFT:  < 3 seconds  Pedal Hair Growth:  Present  Varicosities: none       Left Foot Vascularity   Normal vascular exam    Dorsalis pedis:  2+  Posterior tibial:  2+  Skin  Temperature: warm    Edema Grading:  None  CFT:  < 3 seconds  Pedal Hair Growth:  Present  Varicosities: none        NEUROLOGIC     Right Foot Neurologic   Light touch sensation:  Diminished  Vibratory sensation:  Diminished  Hot/Cold sensation: diminished    Protective Sensation using [place] Monofilament:  6     Left Foot Neurologic   Light touch sensation:  Diminished  Vibratory sensation:  Diminished  Hot/cold sensation: diminished    Protective Sensation using [place]te Monofilament:  5     MUSCULOSKELETAL      Right Foot Musculoskeletal   Tenderness: none    Arch:  Normal     Left Foot Musculoskeletal   Ecchymosis:  None  Tenderness: none    Arch:  Normal     MUSCLE STRENGTH     Right Foot Muscle Strength   Normal strength    Foot dorsiflexion:  5  Foot plantar flexion:  5  Foot inversion:  5  Foot eversion:  5     Left Foot Muscle Strength   Foot dorsiflexion:  5  Foot plantar flexion:  5  Foot inversion:  5  Foot eversion:  5     DERMATOLOGIC     Right Foot Dermatologic   Skin: skin intact    Nails: normal       Left Foot Dermatologic   Skin: skin intact    Nails: normal        ASSESSMENT/PLAN     Diagnoses and all orders for this visit:    1. Neuropathy (Primary)    2. Diabetic foot (CMS/[facility])    3. Non-insulin dependent type 2 diabetes mellitus (CMS/[facility])        Comprehensive lower extremity examination and evaluation was performed.    Discussed findings and treatment plan including risks, benefits, and treatment options with patient in detail. Patient agreed with treatment plan.    Diabetic foot exam performed and documented this date, compliant with CQM required standards. Detail of findings as noted in physical exam.  Lower extremity Neurologic exam for diabetic patient performed and documented this date, compliant with PQRS required standards. Detail of findings as noted in physical exam.  Advised patient importance of good routine lower extremity hygiene. Advised patient importance of  evaluating for intact skin and pain free nail borders.  Advised patient to use mirror to evaluate plantar/ soles of feet for better visualization. Advised patient monitor and phone office to be seen if any cracking to skin, open lesions, painful nail borders or if nails become elongated prior to next visit. Advised patient importance of daily cleansing of lower extremities, followed by good skin cream to maintain normal hydration of skin. Also advised patient importance of close daily monitoring of blood sugar. Advised to regulate diet and medications to maintain control of blood sugar in optimal range. Contact primary care provider if difficulties maintaining blood sugar levels.  Advised Patient of presence of Diabetes Mellitus condition.  Advised Patient risk of progression and worsening or improvement, then return of condition.  Will monitor condition for any change in future. Treat with most appropriate treatment pending status of condition.  Counseled and advised patient extensively on nature and ramifications of diabetes. Standard instructions given to patient for good diabetic foot care and maintenance. Advised importance of careful monitoring to avoid break down and complications secondary to diabetes. Advised patient importance of strict maintenance of blood sugar control. Advised patient of possible ominous results from neglect of condition, i.e.: amputation/ loss of digits, feet and legs, or even death.  Patient states understands counseling, will monitor closely, continue good hygiene and routine diabetic foot care. Patient will contact office is questions or problems.      An After Visit Summary was printed and given to the patient at discharge, including (if requested) any available informative/educational handouts regarding diagnosis, treatment, or medications. All questions were answered to patient/family satisfaction. Should symptoms fail to improve or worsen they agree to call or return to clinic  or to go to the Emergency Department. Discussed the importance of following up with any needed screening tests/labs/specialist appointments and any requested follow-up recommended by me today. Importance of maintaining follow-up discussed and patient accepts that missed appointments can delay diagnosis and potentially lead to worsening of conditions.    Return in about 1 year (around 6/22/2022) for Podiatry Diabetic Foot Exam., or sooner if acute issues arise.    This document has been electronically signed by Eric Ibanez DPM on June 22, 2021 14:32 EDT

## 2021-06-22 NOTE — PATIENT INSTRUCTIONS
Patient is to monitor for recurrence and any new symptoms and to contact Dr. Ibanez's office for a follow-up appointment.      The patient states understanding and agreement with this plan.

## 2021-09-10 ENCOUNTER — TRANSCRIBE ORDERS (OUTPATIENT)
Dept: LAB | Facility: HOSPITAL | Age: 66
End: 2021-09-10

## 2021-09-10 ENCOUNTER — LAB (OUTPATIENT)
Dept: LAB | Facility: HOSPITAL | Age: 66
End: 2021-09-10

## 2021-09-10 DIAGNOSIS — K21.9 GASTRIC REFLUX: ICD-10-CM

## 2021-09-10 DIAGNOSIS — I10 ESSENTIAL HYPERTENSION, MALIGNANT: ICD-10-CM

## 2021-09-10 DIAGNOSIS — E78.5 HYPERLIPIDEMIA, UNSPECIFIED HYPERLIPIDEMIA TYPE: ICD-10-CM

## 2021-09-10 DIAGNOSIS — E11.69 DIABETES MELLITUS ASSOCIATED WITH HORMONAL ETIOLOGY (HCC): ICD-10-CM

## 2021-09-10 DIAGNOSIS — E11.69 DIABETES MELLITUS ASSOCIATED WITH HORMONAL ETIOLOGY (HCC): Primary | ICD-10-CM

## 2021-09-10 LAB
ALBUMIN SERPL-MCNC: 4.4 G/DL (ref 3.5–5.2)
ALBUMIN/GLOB SERPL: 1.6 G/DL
ALP SERPL-CCNC: 129 U/L (ref 39–117)
ALT SERPL W P-5'-P-CCNC: 28 U/L (ref 1–33)
ANION GAP SERPL CALCULATED.3IONS-SCNC: 10.3 MMOL/L (ref 5–15)
AST SERPL-CCNC: 23 U/L (ref 1–32)
BACTERIA UR QL AUTO: NORMAL /HPF
BASOPHILS # BLD AUTO: 0.05 10*3/MM3 (ref 0–0.2)
BASOPHILS NFR BLD AUTO: 0.7 % (ref 0–1.5)
BILIRUB SERPL-MCNC: 0.5 MG/DL (ref 0–1.2)
BILIRUB UR QL STRIP: NEGATIVE
BUN SERPL-MCNC: 20 MG/DL (ref 8–23)
BUN/CREAT SERPL: 13.7 (ref 7–25)
CALCIUM SPEC-SCNC: 9.3 MG/DL (ref 8.6–10.5)
CHLORIDE SERPL-SCNC: 98 MMOL/L (ref 98–107)
CHOLEST SERPL-MCNC: 170 MG/DL (ref 0–200)
CLARITY UR: CLEAR
CO2 SERPL-SCNC: 26.7 MMOL/L (ref 22–29)
COLOR UR: YELLOW
CREAT SERPL-MCNC: 1.46 MG/DL (ref 0.57–1)
DEPRECATED RDW RBC AUTO: 40.1 FL (ref 37–54)
EOSINOPHIL # BLD AUTO: 0.28 10*3/MM3 (ref 0–0.4)
EOSINOPHIL NFR BLD AUTO: 3.9 % (ref 0.3–6.2)
ERYTHROCYTE [DISTWIDTH] IN BLOOD BY AUTOMATED COUNT: 13.8 % (ref 12.3–15.4)
GFR SERPL CREATININE-BSD FRML MDRD: 36 ML/MIN/1.73
GLOBULIN UR ELPH-MCNC: 2.8 GM/DL
GLUCOSE SERPL-MCNC: 115 MG/DL (ref 65–99)
GLUCOSE UR STRIP-MCNC: NEGATIVE MG/DL
HBA1C MFR BLD: 7 % (ref 4.8–5.6)
HCT VFR BLD AUTO: 43.8 % (ref 34–46.6)
HDLC SERPL-MCNC: 50 MG/DL (ref 40–60)
HGB BLD-MCNC: 13.6 G/DL (ref 12–15.9)
HGB UR QL STRIP.AUTO: NEGATIVE
HYALINE CASTS UR QL AUTO: NORMAL /LPF
IMM GRANULOCYTES # BLD AUTO: 0.1 10*3/MM3 (ref 0–0.05)
IMM GRANULOCYTES NFR BLD AUTO: 1.4 % (ref 0–0.5)
KETONES UR QL STRIP: NEGATIVE
LDLC SERPL CALC-MCNC: 97 MG/DL (ref 0–100)
LDLC/HDLC SERPL: 1.88 {RATIO}
LEUKOCYTE ESTERASE UR QL STRIP.AUTO: NEGATIVE
LYMPHOCYTES # BLD AUTO: 1.89 10*3/MM3 (ref 0.7–3.1)
LYMPHOCYTES NFR BLD AUTO: 26.1 % (ref 19.6–45.3)
MCH RBC QN AUTO: 24.8 PG (ref 26.6–33)
MCHC RBC AUTO-ENTMCNC: 31.1 G/DL (ref 31.5–35.7)
MCV RBC AUTO: 79.9 FL (ref 79–97)
MONOCYTES # BLD AUTO: 0.89 10*3/MM3 (ref 0.1–0.9)
MONOCYTES NFR BLD AUTO: 12.3 % (ref 5–12)
NEUTROPHILS NFR BLD AUTO: 4.02 10*3/MM3 (ref 1.7–7)
NEUTROPHILS NFR BLD AUTO: 55.6 % (ref 42.7–76)
NITRITE UR QL STRIP: NEGATIVE
NRBC BLD AUTO-RTO: 0.1 /100 WBC (ref 0–0.2)
PH UR STRIP.AUTO: 6 [PH] (ref 5–8)
PLATELET # BLD AUTO: 293 10*3/MM3 (ref 140–450)
PMV BLD AUTO: 10.5 FL (ref 6–12)
POTASSIUM SERPL-SCNC: 4.5 MMOL/L (ref 3.5–5.2)
PROT SERPL-MCNC: 7.2 G/DL (ref 6–8.5)
PROT UR QL STRIP: ABNORMAL
RBC # BLD AUTO: 5.48 10*6/MM3 (ref 3.77–5.28)
RBC # UR: NORMAL /HPF
REF LAB TEST METHOD: NORMAL
SODIUM SERPL-SCNC: 135 MMOL/L (ref 136–145)
SP GR UR STRIP: >=1.03 (ref 1–1.03)
SQUAMOUS #/AREA URNS HPF: NORMAL /HPF
T4 FREE SERPL-MCNC: 1.17 NG/DL (ref 0.93–1.7)
TRIGL SERPL-MCNC: 131 MG/DL (ref 0–150)
TSH SERPL DL<=0.05 MIU/L-ACNC: 1.4 UIU/ML (ref 0.27–4.2)
UROBILINOGEN UR QL STRIP: ABNORMAL
VLDLC SERPL-MCNC: 23 MG/DL (ref 5–40)
WBC # BLD AUTO: 7.23 10*3/MM3 (ref 3.4–10.8)
WBC UR QL AUTO: NORMAL /HPF

## 2021-09-10 PROCEDURE — 81001 URINALYSIS AUTO W/SCOPE: CPT

## 2021-09-10 PROCEDURE — 80061 LIPID PANEL: CPT

## 2021-09-10 PROCEDURE — 36415 COLL VENOUS BLD VENIPUNCTURE: CPT

## 2021-09-10 PROCEDURE — 85025 COMPLETE CBC W/AUTO DIFF WBC: CPT

## 2021-09-10 PROCEDURE — 84443 ASSAY THYROID STIM HORMONE: CPT

## 2021-09-10 PROCEDURE — 83036 HEMOGLOBIN GLYCOSYLATED A1C: CPT

## 2021-09-10 PROCEDURE — 84439 ASSAY OF FREE THYROXINE: CPT

## 2021-09-10 PROCEDURE — 80053 COMPREHEN METABOLIC PANEL: CPT

## 2021-09-10 PROCEDURE — 82043 UR ALBUMIN QUANTITATIVE: CPT

## 2021-09-11 LAB — ALBUMIN UR-MCNC: 1.7 MG/DL

## 2021-09-16 ENCOUNTER — OFFICE VISIT (OUTPATIENT)
Dept: FAMILY MEDICINE CLINIC | Facility: CLINIC | Age: 66
End: 2021-09-16

## 2021-09-16 ENCOUNTER — HOSPITAL ENCOUNTER (OUTPATIENT)
Dept: GENERAL RADIOLOGY | Facility: HOSPITAL | Age: 66
Discharge: HOME | End: 2021-09-16
Admitting: NURSE PRACTITIONER

## 2021-09-16 VITALS
BODY MASS INDEX: 37.56 KG/M2 | SYSTOLIC BLOOD PRESSURE: 104 MMHG | OXYGEN SATURATION: 97 % | HEIGHT: 64 IN | DIASTOLIC BLOOD PRESSURE: 56 MMHG | HEART RATE: 89 BPM | TEMPERATURE: 96.8 F | WEIGHT: 220 LBS

## 2021-09-16 DIAGNOSIS — M25.551 HIP PAIN, RIGHT: ICD-10-CM

## 2021-09-16 DIAGNOSIS — G62.9 NEUROPATHY: ICD-10-CM

## 2021-09-16 DIAGNOSIS — E03.9 ACQUIRED HYPOTHYROIDISM: ICD-10-CM

## 2021-09-16 DIAGNOSIS — Z12.31 SCREENING MAMMOGRAM, ENCOUNTER FOR: ICD-10-CM

## 2021-09-16 DIAGNOSIS — I10 HYPERTENSION, UNSPECIFIED TYPE: ICD-10-CM

## 2021-09-16 DIAGNOSIS — K21.9 GASTROESOPHAGEAL REFLUX DISEASE WITHOUT ESOPHAGITIS: ICD-10-CM

## 2021-09-16 DIAGNOSIS — E11.42 TYPE 2 DIABETES MELLITUS WITH DIABETIC POLYNEUROPATHY, WITHOUT LONG-TERM CURRENT USE OF INSULIN (HCC): ICD-10-CM

## 2021-09-16 DIAGNOSIS — N28.9 RENAL INSUFFICIENCY: ICD-10-CM

## 2021-09-16 DIAGNOSIS — E78.00 HIGH CHOLESTEROL: ICD-10-CM

## 2021-09-16 PROBLEM — I51.7 RIGHT VENTRICULAR DILATION: Status: ACTIVE | Noted: 2020-01-31

## 2021-09-16 PROBLEM — R00.0 TACHYCARDIA: Status: ACTIVE | Noted: 2021-09-08

## 2021-09-16 PROBLEM — IMO0002 ULCERATIVE LESION: Status: ACTIVE | Noted: 2021-09-16

## 2021-09-16 PROBLEM — Z98.890 HISTORY OF CARDIAC CATHETERIZATION: Status: ACTIVE | Noted: 2020-02-11

## 2021-09-16 PROBLEM — IMO0002 TYPE 2 OR UNSPECIFIED TYPE DIABETES MELLITUS WITH COMPLICATION, UNCONTROLLED: Status: ACTIVE | Noted: 2021-09-16

## 2021-09-16 PROBLEM — J30.2 SEASONAL ALLERGIC RHINITIS: Status: ACTIVE | Noted: 2021-09-16

## 2021-09-16 PROBLEM — L60.0 INGROWN TOENAIL: Status: ACTIVE | Noted: 2021-09-16

## 2021-09-16 PROBLEM — M79.609 PAIN IN SOFT TISSUES OF LIMB: Status: ACTIVE | Noted: 2021-09-16

## 2021-09-16 PROBLEM — I38 HEART VALVE DISORDER: Status: ACTIVE | Noted: 2020-01-31

## 2021-09-16 PROBLEM — M54.50 LOW BACK PAIN: Status: ACTIVE | Noted: 2021-09-16

## 2021-09-16 PROBLEM — L84 CORNS AND CALLOSITY: Status: ACTIVE | Noted: 2021-09-16

## 2021-09-16 PROBLEM — K25.9 STOMACH ULCER: Status: ACTIVE | Noted: 2021-09-16

## 2021-09-16 PROBLEM — D64.9 ANEMIA: Status: ACTIVE | Noted: 2021-09-16

## 2021-09-16 PROCEDURE — 99214 OFFICE O/P EST MOD 30 MIN: CPT | Performed by: NURSE PRACTITIONER

## 2021-09-16 PROCEDURE — 73502 X-RAY EXAM HIP UNI 2-3 VIEWS: CPT

## 2021-09-16 RX ORDER — OMEPRAZOLE 20 MG/1
20 CAPSULE, DELAYED RELEASE ORAL DAILY
Qty: 90 CAPSULE | Refills: 1 | Status: SHIPPED | OUTPATIENT
Start: 2021-09-16 | End: 2022-10-18 | Stop reason: SDUPTHER

## 2021-09-16 RX ORDER — ASPIRIN 81 MG/1
81 TABLET ORAL DAILY
COMMUNITY
End: 2021-11-23

## 2021-09-16 RX ORDER — LIDOCAINE 50 MG/G
3 PATCH TOPICAL EVERY 24 HOURS
Qty: 90 EACH | Refills: 2 | Status: SHIPPED | OUTPATIENT
Start: 2021-09-16 | End: 2021-11-23

## 2021-09-16 RX ORDER — ATORVASTATIN CALCIUM 20 MG/1
20 TABLET, FILM COATED ORAL NIGHTLY
Qty: 90 TABLET | Refills: 1 | Status: SHIPPED | OUTPATIENT
Start: 2021-09-16 | End: 2022-03-16 | Stop reason: SDUPTHER

## 2021-09-16 RX ORDER — METFORMIN HYDROCHLORIDE 500 MG/1
TABLET, EXTENDED RELEASE ORAL
COMMUNITY
Start: 2021-08-06 | End: 2021-09-16 | Stop reason: SDUPTHER

## 2021-09-16 RX ORDER — DULOXETIN HYDROCHLORIDE 30 MG/1
30 CAPSULE, DELAYED RELEASE ORAL DAILY
Qty: 90 CAPSULE | Refills: 2 | Status: SHIPPED | OUTPATIENT
Start: 2021-09-16 | End: 2022-03-16 | Stop reason: SDUPTHER

## 2021-09-16 RX ORDER — LEVOTHYROXINE SODIUM 0.05 MG/1
50 TABLET ORAL DAILY
Qty: 90 TABLET | Refills: 3 | Status: SHIPPED | OUTPATIENT
Start: 2021-09-16 | End: 2022-12-02 | Stop reason: SDUPTHER

## 2021-09-16 RX ORDER — DULOXETIN HYDROCHLORIDE 60 MG/1
60 CAPSULE, DELAYED RELEASE ORAL DAILY
Qty: 90 CAPSULE | Refills: 2 | Status: SHIPPED | OUTPATIENT
Start: 2021-09-16 | End: 2022-10-18 | Stop reason: SDUPTHER

## 2021-09-16 RX ORDER — METFORMIN HYDROCHLORIDE 500 MG/1
1500 TABLET, EXTENDED RELEASE ORAL
Qty: 270 TABLET | Refills: 1 | Status: SHIPPED | OUTPATIENT
Start: 2021-09-16 | End: 2022-03-16 | Stop reason: SDUPTHER

## 2021-09-16 RX ORDER — METFORMIN HYDROCHLORIDE 500 MG/1
1000 TABLET, EXTENDED RELEASE ORAL
Qty: 180 TABLET | Refills: 2 | Status: CANCELLED | OUTPATIENT
Start: 2021-09-16

## 2021-09-16 NOTE — PROGRESS NOTES
Follow Up Office Visit      Patient Name: Vickie Bull  : 1955   MRN: [id number]     Chief Complaint:    Chief Complaint   Patient presents with   • Diabetes   • Heartburn   • Hypertension   • Hyperlipidemia       History of Present Illness: Vickie Bull is a 65 y.o. female who is here today to follow up for  DM2,GERD,HTN,hyperlipidemia,hypothyroidism    Review lab results     C/O right hip pain x2 month  BS-not been controlled    Requesting flu shot    Mammogram 2020 requests order  Pap 2018 dr amador  Colonoscopy 2019  egd   Eye exam 2020 at [facility]   Foot exam dr ly 2021    Neuropathy controlled with gabapentin ([facility] provider in [place] q6 months) and cymbalta 90 mg daily     Taking naproxen for hip pain  Also taking asa daily to prevent   Subjective      Review of Systems:   Review of Systems   Constitutional: Negative for chills and fever.   HENT: Negative for ear pain, sinus pain and sore throat.    Eyes: Negative for visual disturbance.   Respiratory: Negative for cough and shortness of breath.    Gastrointestinal: Negative for abdominal pain, diarrhea, rectal pain and vomiting.   Genitourinary: Negative for dysuria.   Musculoskeletal: Positive for arthralgias.   Skin: Negative for rash.   Neurological: Negative for headaches.   Psychiatric/Behavioral: Negative for sleep disturbance.        Past Medical History:   Past Medical History:   Diagnosis Date   • Acid reflux    • Anemia, unspecified    • Chronic allergic rhinitis    • Condition not found     Ulcer   • Corns and callus    • Diabetes (CMS/HCC)     Unspecified   • Diarrhea 04/15/2014   • GERD (gastroesophageal reflux disease)    • HBP (high blood pressure)    • High cholesterol    • HTN (hypertension)    • Hyperlipemia    • Hypothyroidism    • Ingrown toenail    • Leg pain    • Lumbar disc herniation 2013    L3-4 very small fat lat disc   • Lumbar pain    • Numbness in feet    • Renal  insufficiency 03/16/2021   • Sciatica 11/22/2013    no sig structural compressive lesion   • Seasonal allergies    • Stomach ulcer    • Thyroid disorder        Past Surgical History:   Past Surgical History:   Procedure Laterality Date   • COLONOSCOPY     • ENDOSCOPY     • JOINT REPLACEMENT     • SHOULDER ACROMIOCLAVICULAR JOINT REPAIR     • SHOULDER SURGERY Right 10/2010   • TUBAL ABDOMINAL LIGATION         Family History:   Family History   Problem Relation Age of Onset   • Cancer Mother         unspecified   • Lung cancer Mother    • Diabetes Father         unspecified type   • Prostate cancer Father    • Diabetes Paternal Grandmother         unspecified type   • Heart attack Other    • Diabetes Other    • Diabetes Paternal Aunt         unspecified type   • Diabetes Paternal Uncle         unspecified type       Social History:   Social History     Socioeconomic History   • Marital status:      Spouse name: Not on file   • Number of children: Not on file   • Years of education: Not on file   • Highest education level: Not on file   Tobacco Use   • Smoking status: Never Smoker   • Smokeless tobacco: Never Used   • Tobacco comment: second hand smoke exposure-never   Vaping Use   • Vaping Use: Never used   Substance and Sexual Activity   • Alcohol use: Yes     Comment: rarely; wine and liquor   • Drug use: Never   • Sexual activity: Defer       Medications:     Current Outpatient Medications:   •  ascorbic acid (VITAMIN C) 500 MG tablet, Vitamin C 500 mg oral tablet take 1 tablet by oral route daily   Active, Disp: , Rfl:   •  aspirin (aspirin) 81 MG EC tablet, Take 81 mg by mouth Daily., Disp: , Rfl:   •  atorvastatin (Lipitor) 20 MG tablet, Take 1 tablet by mouth Every Night., Disp: 90 tablet, Rfl: 1  •  cetirizine (zyrTEC) 10 MG tablet, cetirizine 10 mg oral tablet take 1 tablet (10 mg) by oral route once daily   Suspended, Disp: , Rfl:   •  Cholecalciferol 50 MCG (2000 UT) capsule, Vitamin D3 2,000 unit  "oral capsule take 1 capsule by oral route daily   Suspended, Disp: , Rfl:   •  DULoxetine (CYMBALTA) 30 MG capsule, Take 1 capsule by mouth Daily., Disp: 90 capsule, Rfl: 2  •  DULoxetine (CYMBALTA) 60 MG capsule, Take 1 capsule by mouth Daily., Disp: 90 capsule, Rfl: 2  •  gabapentin (NEURONTIN) 600 MG tablet, gabapentin 600 mg oral tablet take 1 tablet by oral route daily   Active, Disp: , Rfl:   •  levothyroxine (Synthroid) 50 MCG tablet, Take 1 tablet by mouth Daily., Disp: 90 tablet, Rfl: 3  •  lidocaine (LIDODERM) 5 %, Place 3 patches on the skin as directed by provider Daily. Remove & Discard patch within 12 hours or as directed by MD, Disp: 90 each, Rfl: 2  •  lisinopril (PRINIVIL,ZESTRIL) 10 MG tablet, Take 10 mg by mouth Daily., Disp: , Rfl:   •  metFORMIN ER (GLUCOPHAGE-XR) 500 MG 24 hr tablet, Take 3 tablets by mouth Daily With Dinner., Disp: 270 tablet, Rfl: 1  •  multivitamin with minerals (MULTIVITAMIN ADULT PO), 1 tablet Daily., Disp: , Rfl:   •  omeprazole (priLOSEC) 20 MG capsule, Take 1 capsule by mouth Daily., Disp: 90 capsule, Rfl: 1  •  Zinc 50 MG capsule, Take  by mouth Daily., Disp: , Rfl:     Allergies:   Allergies   Allergen Reactions   • Cephalexin Hives   • Latex Itching   • Penicillins Hives and Rash         PHQ-2 Total Score: 0   PHQ-9 Total Score: 0     Objective     Physical Exam:  Vital Signs:   Vitals:    09/16/21 0930   BP: 104/56   Pulse: 89   Temp: 96.8 °F (36 °C)   SpO2: 97%   Weight: 99.8 kg (220 lb)   Height: 162.6 cm (64\")     Body mass index is 37.76 kg/m².     Physical Exam  HENT:      Right Ear: Tympanic membrane normal.      Left Ear: Tympanic membrane normal.      Nose: Nose normal.      Mouth/Throat:      Mouth: Mucous membranes are moist.   Eyes:      Conjunctiva/sclera: Conjunctivae normal.      Pupils: Pupils are equal, round, and reactive to light.   Neck:      Vascular: No carotid bruit.   Cardiovascular:      Rate and Rhythm: Normal rate and regular rhythm.      " Heart sounds: Normal heart sounds. No murmur heard.     Pulmonary:      Effort: Pulmonary effort is normal.      Breath sounds: Normal breath sounds.   Abdominal:      General: Bowel sounds are normal.      Palpations: Abdomen is soft.   Musculoskeletal:      Cervical back: Neck supple.      Right lower leg: No edema.      Left lower leg: No edema.      Comments: Right lateral hip tender to palpation   Skin:     General: Skin is warm and dry.      Capillary Refill: Capillary refill takes less than 2 seconds.   Neurological:      Mental Status: She is oriented to person, place, and time.   Psychiatric:         Mood and Affect: Mood normal.         Behavior: Behavior normal.             Assessment / Plan      Assessment/Plan:   Diagnoses and all orders for this visit:    1. Type 2 diabetes mellitus with diabetic polyneuropathy, without long-term current use of insulin (CMS/[facility])  -     CBC Auto Differential; Future  -     Comprehensive Metabolic Panel; Future  -     Hemoglobin A1c; Future  -     Microalbumin / Creatinine Urine Ratio - Urine, Clean Catch; Future  -     Urinalysis With Culture If Indicated -; Future  -     TSH; Future  -     Lipid Panel; Future    2. High cholesterol  -     CBC Auto Differential; Future  -     Comprehensive Metabolic Panel; Future  -     Hemoglobin A1c; Future  -     Microalbumin / Creatinine Urine Ratio - Urine, Clean Catch; Future  -     Urinalysis With Culture If Indicated -; Future  -     TSH; Future  -     Lipid Panel; Future    3. Hypertension, unspecified type  -     CBC Auto Differential; Future  -     Comprehensive Metabolic Panel; Future  -     Hemoglobin A1c; Future  -     Microalbumin / Creatinine Urine Ratio - Urine, Clean Catch; Future  -     Urinalysis With Culture If Indicated -; Future  -     TSH; Future  -     Lipid Panel; Future    4. Renal insufficiency  -     CBC Auto Differential; Future  -     Comprehensive Metabolic Panel; Future  -     Hemoglobin A1c; Future  -      Microalbumin / Creatinine Urine Ratio - Urine, Clean Catch; Future  -     Urinalysis With Culture If Indicated -; Future  -     TSH; Future  -     Lipid Panel; Future    5. Acquired hypothyroidism  -     CBC Auto Differential; Future  -     Comprehensive Metabolic Panel; Future  -     Hemoglobin A1c; Future  -     Microalbumin / Creatinine Urine Ratio - Urine, Clean Catch; Future  -     Urinalysis With Culture If Indicated -; Future  -     TSH; Future  -     Lipid Panel; Future    6. Gastroesophageal reflux disease without esophagitis    7. Neuropathy    8. Hip pain, right  -     XR Hip With or Without Pelvis 2 - 3 View Right; Future    9. Screening mammogram, encounter for  -     Mammo Screening Digital Tomosynthesis Bilateral With CAD; Future    Other orders  -     Cancel: metFORMIN ER (GLUCOPHAGE-XR) 500 MG 24 hr tablet; Take 2 tablets by mouth Daily With Breakfast.  Dispense: 180 tablet; Refill: 2  -     levothyroxine (Synthroid) 50 MCG tablet; Take 1 tablet by mouth Daily.  Dispense: 90 tablet; Refill: 3  -     DULoxetine (CYMBALTA) 30 MG capsule; Take 1 capsule by mouth Daily.  Dispense: 90 capsule; Refill: 2  -     DULoxetine (CYMBALTA) 60 MG capsule; Take 1 capsule by mouth Daily.  Dispense: 90 capsule; Refill: 2  -     atorvastatin (Lipitor) 20 MG tablet; Take 1 tablet by mouth Every Night.  Dispense: 90 tablet; Refill: 1  -     omeprazole (priLOSEC) 20 MG capsule; Take 1 capsule by mouth Daily.  Dispense: 90 capsule; Refill: 1  -     metFORMIN ER (GLUCOPHAGE-XR) 500 MG 24 hr tablet; Take 3 tablets by mouth Daily With Dinner.  Dispense: 270 tablet; Refill: 1  -     lidocaine (LIDODERM) 5 %; Place 3 patches on the skin as directed by provider Daily. Remove & Discard patch within 12 hours or as directed by MD  Dispense: 90 each; Refill: 2    Diabetes mellitus type 2 reviewed last hemoglobin A1c 7 well-controlled at this time we will continue Metformin with dinner recommend exercise 30 minutes  "daily  Hyperlipidemia LDL above goal with enzymes normal patient ports she is eating poorly we will continue Lipitor 20 mg at this time recommend decrease fried foods red meat pork products cheese increase fruits vegetables whole grains exercise 30 minutes daily and weight loss patient agrees to try diet and exercise prior to increasing Lipitor dose will recheck in 6 months if LDL remains above goal will increase Lipitor to 40 mg nightly  Hypothyroidism TSH normal we will continue current dose of Synthroid 50 mcg daily  Renal insufficiency recommend avoid all NSAIDs increase water intake  Reflux currently controlled with omeprazole 20 mg daily recommend acid reducing diet  Neuropathy currently controlled with gabapentin and Cymbalta 90 mg daily [facility]  Right hip pain will obtain x-ray and call with results and further instructions we will provide lidocaine patches for pain         Follow Up:   Return in about 6 months (around 3/16/2022).    Erwin Ascencio, HANS    \"Please note that portions of this note were completed with a voice recognition program.\"    "

## 2021-09-16 NOTE — PATIENT INSTRUCTIONS
"High Cholesterol    High cholesterol is a condition in which the blood has high levels of a white, waxy substance similar to fat (cholesterol). The liver makes all the cholesterol that the body needs. The human body needs small amounts of cholesterol to help build cells. A person gets extra or excess cholesterol from the food that he or she eats.  The blood carries cholesterol from the liver to the rest of the body. If you have high cholesterol, deposits (plaques) may build up on the walls of your arteries. Arteries are the blood vessels that carry blood away from your heart. These plaques make the arteries narrow and stiff.  Cholesterol plaques increase your risk for heart attack and stroke. Work with your health care provider to keep your cholesterol levels in a healthy range.  What increases the risk?  The following factors may make you more likely to develop this condition:  · Eating foods that are high in animal fat (saturated fat) or cholesterol.  · Being overweight.  · Not getting enough exercise.  · A family history of high cholesterol (familial hypercholesterolemia).  · Use of tobacco products.  · Having diabetes.  What are the signs or symptoms?  There are no symptoms of this condition.  How is this diagnosed?  This condition may be diagnosed based on the results of a blood test.  · If you are older than 20 years of age, your health care provider may check your cholesterol levels every 4-6 years.  · You may be checked more often if you have high cholesterol or other risk factors for heart disease.  The blood test for cholesterol measures:  · \"Bad\" cholesterol, or LDL cholesterol. This is the main type of cholesterol that causes heart disease. The desired level is less than 100 mg/dL.  · \"Good\" cholesterol, or HDL cholesterol. HDL helps protect against heart disease by cleaning the arteries and carrying the LDL to the liver for processing. The desired level for HDL is 60 mg/dL or higher.  · Triglycerides. " These are fats that your body can store or burn for energy. The desired level is less than 150 mg/dL.  · Total cholesterol. This measures the total amount of cholesterol in your blood and includes LDL, HDL, and triglycerides. The desired level is less than 200 mg/dL.  How is this treated?  This condition may be treated with:  · Diet changes. You may be asked to eat foods that have more fiber and less saturated fats or added sugar.  · Lifestyle changes. These may include regular exercise, maintaining a healthy weight, and quitting use of tobacco products.  · Medicines. These are given when diet and lifestyle changes have not worked. You may be prescribed a statin medicine to help lower your cholesterol levels.  Follow these instructions at home:  Eating and drinking    · Eat a healthy, balanced diet. This diet includes:  ? Daily servings of a variety of fresh, frozen, or canned fruits and vegetables.  ? Daily servings of whole grain foods that are rich in fiber.  ? Foods that are low in saturated fats and trans fats. These include poultry and fish without skin, lean cuts of meat, and low-fat dairy products.  ? A variety of fish, especially oily fish that contain omega-3 fatty acids. Aim to eat fish at least 2 times a week.  · Avoid foods and drinks that have added sugar.  · Use healthy cooking methods, such as roasting, grilling, broiling, baking, poaching, steaming, and stir-frying. Do not tellez your food except for stir-frying.    Lifestyle    · Get regular exercise. Aim to exercise for a total of 150 minutes a week. Increase your activity level by doing activities such as gardening, walking, and taking the stairs.  · Do not use any products that contain nicotine or tobacco, such as cigarettes, e-cigarettes, and chewing tobacco. If you need help quitting, ask your health care provider.    General instructions  · Take over-the-counter and prescription medicines only as told by your health care provider.  · Keep all  "follow-up visits as told by your health care provider. This is important.  Where to find more information  · American Heart Association: www.heart.org  · National Heart, Lung, and Blood [place]: www.nhlbi.nih.gov  Contact a health care provider if:  · You have trouble achieving or maintaining a healthy diet or weight.  · You are starting an exercise program.  · You are unable to stop smoking.  Get help right away if:  · You have chest pain.  · You have trouble breathing.  · You have any symptoms of a stroke. \"BE FAST\" is an easy way to remember the main warning signs of a stroke:  ? B - Balance. Signs are dizziness, sudden trouble walking, or loss of balance.  ? E - Eyes. Signs are trouble seeing or a sudden change in vision.  ? F - Face. Signs are sudden weakness or numbness of the face, or the face or eyelid drooping on one side.  ? A - Arms. Signs are weakness or numbness in an arm. This happens suddenly and usually on one side of the body.  ? S - Speech. Signs are sudden trouble speaking, slurred speech, or trouble understanding what people say.  ? T - Time. Time to call emergency services. Write down what time symptoms started.  · You have other signs of a stroke, such as:  ? A sudden, severe headache with no known cause.  ? Nausea or vomiting.  ? Seizure.  These symptoms may represent a serious problem that is an emergency. Do not wait to see if the symptoms will go away. Get medical help right away. Call your local emergency services (911 in the [place] Do not drive yourself to the hospital.  Summary  · Cholesterol plaques increase your risk for heart attack and stroke. Work with your health care provider to keep your cholesterol levels in a healthy range.  · Eat a healthy, balanced diet, get regular exercise, and maintain a healthy weight.  · Do not use any products that contain nicotine or tobacco, such as cigarettes, e-cigarettes, and chewing tobacco.  · Get help right away if you have any symptoms of a " stroke.  This information is not intended to replace advice given to you by your health care provider. Make sure you discuss any questions you have with your health care provider.  Document Revised: 11/16/2020 Document Reviewed: 11/16/2020  Elsevier Patient Education © 2021 [employer]

## 2021-09-17 ENCOUNTER — TELEPHONE (OUTPATIENT)
Dept: FAMILY MEDICINE CLINIC | Facility: CLINIC | Age: 66
End: 2021-09-17

## 2021-09-17 NOTE — TELEPHONE ENCOUNTER
Caller: Vickie Bull    Relationship to patient: Self    Best call back number: [phone]    Patient is needing: PATIENT CALLING FOR HER RECENT XRAY RESULTS. PLEASE CALL BACK AND ADVISE.

## 2021-09-23 ENCOUNTER — HOSPITAL ENCOUNTER (OUTPATIENT)
Dept: MAMMOGRAPHY | Facility: HOSPITAL | Age: 66
Discharge: HOME | End: 2021-09-23
Admitting: NURSE PRACTITIONER

## 2021-09-23 DIAGNOSIS — Z12.31 SCREENING MAMMOGRAM, ENCOUNTER FOR: ICD-10-CM

## 2021-09-23 PROCEDURE — 77063 BREAST TOMOSYNTHESIS BI: CPT

## 2021-09-23 PROCEDURE — 77067 SCR MAMMO BI INCL CAD: CPT

## 2021-11-05 ENCOUNTER — OFFICE VISIT (OUTPATIENT)
Dept: FAMILY MEDICINE CLINIC | Facility: CLINIC | Age: 66
End: 2021-11-05

## 2021-11-05 VITALS
HEART RATE: 78 BPM | OXYGEN SATURATION: 98 % | SYSTOLIC BLOOD PRESSURE: 132 MMHG | DIASTOLIC BLOOD PRESSURE: 72 MMHG | HEIGHT: 64 IN | WEIGHT: 220 LBS | BODY MASS INDEX: 37.56 KG/M2 | TEMPERATURE: 97.8 F

## 2021-11-05 DIAGNOSIS — E11.42 TYPE 2 DIABETES MELLITUS WITH DIABETIC POLYNEUROPATHY, WITHOUT LONG-TERM CURRENT USE OF INSULIN (HCC): Primary | ICD-10-CM

## 2021-11-05 DIAGNOSIS — M25.552 BILATERAL HIP PAIN: ICD-10-CM

## 2021-11-05 DIAGNOSIS — M54.41 CHRONIC MIDLINE LOW BACK PAIN WITH BILATERAL SCIATICA: ICD-10-CM

## 2021-11-05 DIAGNOSIS — M25.551 BILATERAL HIP PAIN: ICD-10-CM

## 2021-11-05 DIAGNOSIS — M54.42 CHRONIC MIDLINE LOW BACK PAIN WITH BILATERAL SCIATICA: ICD-10-CM

## 2021-11-05 DIAGNOSIS — G89.29 CHRONIC MIDLINE LOW BACK PAIN WITH BILATERAL SCIATICA: ICD-10-CM

## 2021-11-05 DIAGNOSIS — M54.17 RADICULITIS, LUMBOSACRAL: ICD-10-CM

## 2021-11-05 PROCEDURE — 99213 OFFICE O/P EST LOW 20 MIN: CPT | Performed by: NURSE PRACTITIONER

## 2021-11-05 NOTE — PROGRESS NOTES
Patient Name: Vickie Bull  : 1955   MRN: [id number]     Chief Complaint:    Chief Complaint   Patient presents with   • Hip Pain   • Back Pain       History of Present Illness: Vickie Bull is a 65 y.o. female who is here today for   C/O-low back pain, bilateral hip pain and numbness in feet and legs for several months, progressively worse in the past 6-8 weeks   [facility]  completed physical for 6 weeks no improvement, offered refer to pain management but patient refused   Subjective      Review of Systems:   Review of Systems   Constitutional: Negative for fever.   Respiratory: Negative for shortness of breath.    Cardiovascular: Negative for chest pain and leg swelling.   Musculoskeletal: Positive for back pain and myalgias.   Neurological: Positive for numbness.        Past Medical History:   Past Medical History:   Diagnosis Date   • Acid reflux    • Anemia, unspecified    • Chronic allergic rhinitis    • Condition not found     Ulcer   • Corns and callus    • Diabetes (CMS/HCC)     Unspecified   • Diarrhea 04/15/2014   • GERD (gastroesophageal reflux disease)    • HBP (high blood pressure)    • High cholesterol    • HTN (hypertension)    • Hyperlipemia    • Hypothyroidism    • Ingrown toenail    • Leg pain    • Lumbar disc herniation 2013    L3-4 very small fat lat disc   • Lumbar pain    • Numbness in feet    • Renal insufficiency 2021   • Sciatica 2013    no sig structural compressive lesion   • Seasonal allergies    • Stomach ulcer    • Thyroid disorder        Past Surgical History:   Past Surgical History:   Procedure Laterality Date   • COLONOSCOPY     • ENDOSCOPY     • JOINT REPLACEMENT     • SHOULDER ACROMIOCLAVICULAR JOINT REPAIR     • SHOULDER SURGERY Right 10/2010   • TUBAL ABDOMINAL LIGATION         Family History:   Family History   Problem Relation Age of Onset   • Cancer Mother         unspecified   •  Lung cancer Mother    • Diabetes Father         unspecified type   • Prostate cancer Father    • Diabetes Paternal Grandmother         unspecified type   • Heart attack Other    • Diabetes Other    • Diabetes Paternal Aunt         unspecified type   • Diabetes Paternal Uncle         unspecified type       Social History:   Social History     Socioeconomic History   • Marital status:    Tobacco Use   • Smoking status: Never Smoker   • Smokeless tobacco: Never Used   • Tobacco comment: second hand smoke exposure-never   Vaping Use   • Vaping Use: Never used   Substance and Sexual Activity   • Alcohol use: Yes     Comment: rarely; wine and liquor   • Drug use: Never   • Sexual activity: Defer       Medications:     Current Outpatient Medications:   •  ascorbic acid (VITAMIN C) 500 MG tablet, Vitamin C 500 mg oral tablet take 1 tablet by oral route daily   Active, Disp: , Rfl:   •  aspirin (aspirin) 81 MG EC tablet, Take 81 mg by mouth Daily., Disp: , Rfl:   •  atorvastatin (Lipitor) 20 MG tablet, Take 1 tablet by mouth Every Night., Disp: 90 tablet, Rfl: 1  •  cetirizine (zyrTEC) 10 MG tablet, cetirizine 10 mg oral tablet take 1 tablet (10 mg) by oral route once daily   Suspended, Disp: , Rfl:   •  Cholecalciferol 50 MCG (2000 UT) capsule, Vitamin D3 2,000 unit oral capsule take 1 capsule by oral route daily   Suspended, Disp: , Rfl:   •  DULoxetine (CYMBALTA) 30 MG capsule, Take 1 capsule by mouth Daily., Disp: 90 capsule, Rfl: 2  •  DULoxetine (CYMBALTA) 60 MG capsule, Take 1 capsule by mouth Daily., Disp: 90 capsule, Rfl: 2  •  gabapentin (NEURONTIN) 600 MG tablet, gabapentin 600 mg oral tablet take 1 tablet by oral route daily   Active, Disp: , Rfl:   •  levothyroxine (Synthroid) 50 MCG tablet, Take 1 tablet by mouth Daily., Disp: 90 tablet, Rfl: 3  •  lidocaine (LIDODERM) 5 %, Place 3 patches on the skin as directed by provider Daily. Remove & Discard patch within 12 hours or as directed by MD, Disp: 90  "each, Rfl: 2  •  lisinopril (PRINIVIL,ZESTRIL) 10 MG tablet, Take 10 mg by mouth Daily., Disp: , Rfl:   •  metFORMIN ER (GLUCOPHAGE-XR) 500 MG 24 hr tablet, Take 3 tablets by mouth Daily With Dinner., Disp: 270 tablet, Rfl: 1  •  multivitamin with minerals (MULTIVITAMIN ADULT PO), 1 tablet Daily., Disp: , Rfl:   •  omeprazole (priLOSEC) 20 MG capsule, Take 1 capsule by mouth Daily., Disp: 90 capsule, Rfl: 1  •  Zinc 50 MG capsule, Take  by mouth Daily., Disp: , Rfl:     Allergies:   Allergies   Allergen Reactions   • Cephalexin Hives   • Latex Itching   • Penicillins Hives and Rash         Objective     Physical Exam:  Vital Signs:   Vitals:    11/05/21 1417   BP: 132/72   Pulse: 78   Temp: 97.8 °F (36.6 °C)   SpO2: 98%   Weight: 99.8 kg (220 lb)   Height: 162.6 cm (64\")     Body mass index is 37.76 kg/m².     Physical Exam  Cardiovascular:      Rate and Rhythm: Normal rate and regular rhythm.      Heart sounds: Normal heart sounds. No murmur heard.      Pulmonary:      Effort: Pulmonary effort is normal.      Breath sounds: Normal breath sounds.   Abdominal:      General: Bowel sounds are normal.      Palpations: Abdomen is soft.   Musculoskeletal:      Lumbar back: Tenderness present. No swelling or spasms. Normal range of motion. Negative right straight leg raise test and negative left straight leg raise test.      Right lower leg: No edema.      Left lower leg: No edema.   Skin:     General: Skin is warm and dry.   Neurological:      Mental Status: She is alert.      Gait: Gait normal.             Assessment / Plan      Assessment/Plan:   Diagnoses and all orders for this visit:    1. Type 2 diabetes mellitus with diabetic polyneuropathy, without long-term current use of insulin (HCC) (Primary)    2. Chronic midline low back pain with bilateral sciatica  -     Cancel: Ambulatory Referral to Neurology  -     Ambulatory Referral to Neurosurgery    3. Radiculitis, lumbosacral  -     Cancel: Ambulatory Referral to " Neurology  -     Ambulatory Referral to Neurosurgery    4. Bilateral hip pain  -     Cancel: Ambulatory Referral to Neurology  -     Ambulatory Referral to Neurosurgery       Chronic low back pain with radiation to bilateral legs recommend increasing gabapentin to bid dosing, [facility]   Will refer to neurosurgery and [facility] results   Recommend Lidocaine patches as well         Follow Up:   Return in about 5 months (around 4/5/2022).    HANS Ayon      Please note that portions of this note were completed with a voice recognition program.

## 2021-11-23 ENCOUNTER — OFFICE VISIT (OUTPATIENT)
Dept: NEUROSURGERY | Facility: CLINIC | Age: 66
End: 2021-11-23

## 2021-11-23 VITALS
SYSTOLIC BLOOD PRESSURE: 126 MMHG | DIASTOLIC BLOOD PRESSURE: 68 MMHG | BODY MASS INDEX: 37.56 KG/M2 | WEIGHT: 220 LBS | HEART RATE: 99 BPM | HEIGHT: 64 IN

## 2021-11-23 DIAGNOSIS — M54.50 CHRONIC MIDLINE LOW BACK PAIN WITHOUT SCIATICA: ICD-10-CM

## 2021-11-23 DIAGNOSIS — M47.816 LUMBAR FACET ARTHROPATHY: Primary | ICD-10-CM

## 2021-11-23 DIAGNOSIS — G89.29 CHRONIC MIDLINE LOW BACK PAIN WITHOUT SCIATICA: ICD-10-CM

## 2021-11-23 DIAGNOSIS — R20.2 PARESTHESIAS: ICD-10-CM

## 2021-11-23 PROCEDURE — 99215 OFFICE O/P EST HI 40 MIN: CPT | Performed by: NURSE PRACTITIONER

## 2021-11-23 NOTE — PROGRESS NOTES
"Chief Complaint  Back Pain    Subjective          Vickie Bull who is a 65 y.o. year old female who presents to [facility] for evaluation of her low back pain.    Pt with concerns of chronic back pain for many years. Has tenderness to touch in the back with tingling. Rates her pain in the back 4/10. Increased activity, prolonged walking and standing make her pain worse.  She also has numbness in lateral thighs with sensitivity to touch. She will have aching pain and stiffness in the morning.  In June of this year she had an [facility] showing degenerative changes without significant canal or foraminal narrowing. She worked with physical therapy for 6 weeks, which provided some benefit. She more recently has been having pain into the groin bilateral with pain and weakness into thighs. Having pain and difficulty with walking up and down stairs.     She has concerns of numbness in the toes and soles of the feet, worse on the right side. She has a history of diabetes, well controlled. Taking Gabapentin for her neuropathic pain. She has been using lidocaine patches on her back as needed. Tylenol as needed. Unable to take NSAIDs due to renal function.       Recent Interventions: PT      Review of Systems   Musculoskeletal: Positive for arthralgias and back pain.   Neurological: Positive for numbness.   All other systems reviewed and are negative.       Objective   Vital Signs:   /68   Pulse 99   Ht 162.6 cm (64\")   Wt 99.8 kg (220 lb)   BMI 37.76 kg/m²       Physical Exam  Vitals reviewed.   Constitutional:       Appearance: Normal appearance.   Musculoskeletal:      Lumbar back: Tenderness present. Negative right straight leg raise test and negative left straight leg raise test.      Right hip: No tenderness. Normal range of motion.      Left hip: No tenderness. Normal range of motion.      Comments: Myofascial trigger points TTP in the low back and " hips.    Neurological:      Mental Status: She is alert and oriented to person, place, and time.      Gait: Gait is intact.      Deep Tendon Reflexes: Strength normal.      Reflex Scores:       Patellar reflexes are 2+ on the right side and 2+ on the left side.       Achilles reflexes are 0 on the right side and 0 on the left side.       Neurologic Exam     Mental Status   Oriented to person, place, and time.   Level of consciousness: alert    Motor Exam   Muscle bulk: normal  Overall muscle tone: normal    Strength   Strength 5/5 throughout.     Sensory Exam   Light touch normal.     Gait, Coordination, and Reflexes     Gait  Gait: normal    Reflexes   Right patellar: 2+  Left patellar: 2+  Right achilles: 0  Left achilles: 0       Result Review :       Data reviewed: Radiologic studies MRI Lumbar Spine on 6/23/21 at [facility] personally reviewed. Mild degenerative changes with minimal retrolisthesis of L2 on L3 and disc bulge resulting in mild right foraminal narrowing. [place] facet arthropathy. No signficant canal or foraminal stenosis.            Assessment and Plan    Diagnoses and all orders for this visit:    1. Lumbar facet arthropathy (Primary)  -     Ambulatory Referral to Pain Management    2. Chronic midline low back pain without sciatica  -     Ambulatory Referral to Pain Management    3. Paresthesias    Pt presenting today with concerns of chronic low back pain. We reviewed her MRI Lumbar Spine, revealing mild degenerative changes with facet hypertrophy. No surgical recommendations at this time. We discussed the benefit of core strengthening, weight management, and routine stretching for chronic back pain. Will refer to pain management for lumbar MBB and RFA vs trigger point injections. She has notable myofascial trigger points significantly tender to palpation.     She mentions concerns of numbness and tingling in the feet. 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     She can follow up in our office on an as needed basis.     I spent 40 minutes caring for Vickie on this date of service. This time includes time spent by me in the following activities:preparing for the visit, reviewing tests, obtaining and/or reviewing a separately obtained history, performing a medically appropriate examination and/or evaluation , counseling and educating the patient/family/caregiver, referring and communicating with other health care professionals , documenting information in the medical record and independently interpreting results and communicating that information with the patient/family/caregiver.    Follow Up   Return if symptoms worsen or fail to improve.  Patient was given instructions and counseling regarding her condition or for health maintenance advice.     -Referral to pain management for lumbar RFA trial  -Follow up as needed

## 2021-12-06 ENCOUNTER — TELEPHONE (OUTPATIENT)
Dept: NEUROSURGERY | Facility: CLINIC | Age: 66
End: 2021-12-06

## 2021-12-06 NOTE — TELEPHONE ENCOUNTER
Caller: Vickie Bull    Relationship: Self    What form or medical record are you requesting: OVN, MRI REPORT    Who is requesting this form or medical record from you: [facility]    How would you like to receive the form or medical records (pick-up, mail, fax): FAX   If fax, what is the fax number: [facility] FAX #    Timeframe paperwork needed: ASAP    Additional notes: PATIENT WAS REFERRED TO PAIN MANAGEMENT BY OUR OFFICE, BUT OVN & MRI REPORT NEVER MADE IT TO [facility]. PLEASE FAX RECORDS ASAP. PATIENT WILL CALL PM OFFICE TOMORROW TO CHECK THAT THEY WERE RECEIVED. THANK YOU.

## 2022-03-10 ENCOUNTER — LAB (OUTPATIENT)
Dept: LAB | Facility: HOSPITAL | Age: 67
End: 2022-03-10

## 2022-03-10 DIAGNOSIS — I10 HYPERTENSION, UNSPECIFIED TYPE: ICD-10-CM

## 2022-03-10 DIAGNOSIS — N28.9 RENAL INSUFFICIENCY: ICD-10-CM

## 2022-03-10 DIAGNOSIS — E78.00 HIGH CHOLESTEROL: ICD-10-CM

## 2022-03-10 DIAGNOSIS — E11.42 TYPE 2 DIABETES MELLITUS WITH DIABETIC POLYNEUROPATHY, WITHOUT LONG-TERM CURRENT USE OF INSULIN: ICD-10-CM

## 2022-03-10 DIAGNOSIS — E03.9 ACQUIRED HYPOTHYROIDISM: ICD-10-CM

## 2022-03-10 LAB
ALBUMIN SERPL-MCNC: 4.3 G/DL (ref 3.5–5.2)
ALBUMIN UR-MCNC: <1.2 MG/DL
ALBUMIN/GLOB SERPL: 2 G/DL
ALP SERPL-CCNC: 128 U/L (ref 39–117)
ALT SERPL W P-5'-P-CCNC: 30 U/L (ref 1–33)
ANION GAP SERPL CALCULATED.3IONS-SCNC: 14 MMOL/L (ref 5–15)
AST SERPL-CCNC: 25 U/L (ref 1–32)
BASOPHILS # BLD AUTO: 0.05 10*3/MM3 (ref 0–0.2)
BASOPHILS NFR BLD AUTO: 0.8 % (ref 0–1.5)
BILIRUB SERPL-MCNC: 0.4 MG/DL (ref 0–1.2)
BILIRUB UR QL STRIP: NEGATIVE
BUN SERPL-MCNC: 15 MG/DL (ref 8–23)
BUN/CREAT SERPL: 18.5 (ref 7–25)
CALCIUM SPEC-SCNC: 9.6 MG/DL (ref 8.6–10.5)
CHLORIDE SERPL-SCNC: 101 MMOL/L (ref 98–107)
CHOLEST SERPL-MCNC: 178 MG/DL (ref 0–200)
CLARITY UR: CLEAR
CO2 SERPL-SCNC: 27 MMOL/L (ref 22–29)
COLOR UR: YELLOW
CREAT SERPL-MCNC: 0.81 MG/DL (ref 0.57–1)
CREAT UR-MCNC: 142.9 MG/DL
DEPRECATED RDW RBC AUTO: 39.3 FL (ref 37–54)
EGFRCR SERPLBLD CKD-EPI 2021: 79.7 ML/MIN/1.73
EOSINOPHIL # BLD AUTO: 0.3 10*3/MM3 (ref 0–0.4)
EOSINOPHIL NFR BLD AUTO: 4.9 % (ref 0.3–6.2)
ERYTHROCYTE [DISTWIDTH] IN BLOOD BY AUTOMATED COUNT: 13.7 % (ref 12.3–15.4)
GLOBULIN UR ELPH-MCNC: 2.1 GM/DL
GLUCOSE SERPL-MCNC: 85 MG/DL (ref 65–99)
GLUCOSE UR STRIP-MCNC: NEGATIVE MG/DL
HBA1C MFR BLD: 7.8 % (ref 4.8–5.6)
HCT VFR BLD AUTO: 41 % (ref 34–46.6)
HDLC SERPL-MCNC: 54 MG/DL (ref 40–60)
HGB BLD-MCNC: 12.8 G/DL (ref 12–15.9)
HGB UR QL STRIP.AUTO: NEGATIVE
IMM GRANULOCYTES # BLD AUTO: 0.09 10*3/MM3 (ref 0–0.05)
IMM GRANULOCYTES NFR BLD AUTO: 1.5 % (ref 0–0.5)
KETONES UR QL STRIP: NEGATIVE
LDLC SERPL CALC-MCNC: 91 MG/DL (ref 0–100)
LDLC/HDLC SERPL: 1.57 {RATIO}
LEUKOCYTE ESTERASE UR QL STRIP.AUTO: NEGATIVE
LYMPHOCYTES # BLD AUTO: 1.81 10*3/MM3 (ref 0.7–3.1)
LYMPHOCYTES NFR BLD AUTO: 29.5 % (ref 19.6–45.3)
MCH RBC QN AUTO: 24.7 PG (ref 26.6–33)
MCHC RBC AUTO-ENTMCNC: 31.2 G/DL (ref 31.5–35.7)
MCV RBC AUTO: 79 FL (ref 79–97)
MICROALBUMIN/CREAT UR: NORMAL MG/G{CREAT}
MONOCYTES # BLD AUTO: 0.74 10*3/MM3 (ref 0.1–0.9)
MONOCYTES NFR BLD AUTO: 12.1 % (ref 5–12)
NEUTROPHILS NFR BLD AUTO: 3.15 10*3/MM3 (ref 1.7–7)
NEUTROPHILS NFR BLD AUTO: 51.2 % (ref 42.7–76)
NITRITE UR QL STRIP: NEGATIVE
NRBC BLD AUTO-RTO: 0 /100 WBC (ref 0–0.2)
PH UR STRIP.AUTO: 5.5 [PH] (ref 5–8)
PLATELET # BLD AUTO: 292 10*3/MM3 (ref 140–450)
PMV BLD AUTO: 10.6 FL (ref 6–12)
POTASSIUM SERPL-SCNC: 4.8 MMOL/L (ref 3.5–5.2)
PROT SERPL-MCNC: 6.4 G/DL (ref 6–8.5)
PROT UR QL STRIP: NEGATIVE
RBC # BLD AUTO: 5.19 10*6/MM3 (ref 3.77–5.28)
SODIUM SERPL-SCNC: 142 MMOL/L (ref 136–145)
SP GR UR STRIP: 1.02 (ref 1–1.03)
TRIGL SERPL-MCNC: 195 MG/DL (ref 0–150)
TSH SERPL DL<=0.05 MIU/L-ACNC: 1.55 UIU/ML (ref 0.27–4.2)
UROBILINOGEN UR QL STRIP: NORMAL
VLDLC SERPL-MCNC: 33 MG/DL (ref 5–40)
WBC NRBC COR # BLD: 6.14 10*3/MM3 (ref 3.4–10.8)

## 2022-03-10 PROCEDURE — 83036 HEMOGLOBIN GLYCOSYLATED A1C: CPT

## 2022-03-10 PROCEDURE — 85025 COMPLETE CBC W/AUTO DIFF WBC: CPT

## 2022-03-10 PROCEDURE — 81003 URINALYSIS AUTO W/O SCOPE: CPT

## 2022-03-10 PROCEDURE — 82570 ASSAY OF URINE CREATININE: CPT

## 2022-03-10 PROCEDURE — 82043 UR ALBUMIN QUANTITATIVE: CPT

## 2022-03-10 PROCEDURE — 84443 ASSAY THYROID STIM HORMONE: CPT

## 2022-03-10 PROCEDURE — 80053 COMPREHEN METABOLIC PANEL: CPT

## 2022-03-10 PROCEDURE — 80061 LIPID PANEL: CPT

## 2022-03-16 ENCOUNTER — TELEPHONE (OUTPATIENT)
Dept: FAMILY MEDICINE CLINIC | Facility: CLINIC | Age: 67
End: 2022-03-16

## 2022-03-16 ENCOUNTER — OFFICE VISIT (OUTPATIENT)
Dept: FAMILY MEDICINE CLINIC | Facility: CLINIC | Age: 67
End: 2022-03-16

## 2022-03-16 ENCOUNTER — HOSPITAL ENCOUNTER (OUTPATIENT)
Dept: GENERAL RADIOLOGY | Facility: HOSPITAL | Age: 67
Discharge: HOME | End: 2022-03-16
Admitting: NURSE PRACTITIONER

## 2022-03-16 VITALS
SYSTOLIC BLOOD PRESSURE: 124 MMHG | BODY MASS INDEX: 37.9 KG/M2 | HEIGHT: 64 IN | TEMPERATURE: 96.8 F | WEIGHT: 222 LBS | DIASTOLIC BLOOD PRESSURE: 70 MMHG | HEART RATE: 94 BPM | OXYGEN SATURATION: 99 %

## 2022-03-16 DIAGNOSIS — G89.29 CHRONIC MIDLINE THORACIC BACK PAIN: ICD-10-CM

## 2022-03-16 DIAGNOSIS — G89.29 CHRONIC MIDLINE LOW BACK PAIN WITH BILATERAL SCIATICA: ICD-10-CM

## 2022-03-16 DIAGNOSIS — K21.9 GASTROESOPHAGEAL REFLUX DISEASE WITHOUT ESOPHAGITIS: ICD-10-CM

## 2022-03-16 DIAGNOSIS — M54.42 CHRONIC MIDLINE LOW BACK PAIN WITH BILATERAL SCIATICA: ICD-10-CM

## 2022-03-16 DIAGNOSIS — M54.41 CHRONIC MIDLINE LOW BACK PAIN WITH BILATERAL SCIATICA: ICD-10-CM

## 2022-03-16 DIAGNOSIS — E03.9 ACQUIRED HYPOTHYROIDISM: ICD-10-CM

## 2022-03-16 DIAGNOSIS — J30.2 SEASONAL ALLERGIC RHINITIS, UNSPECIFIED TRIGGER: ICD-10-CM

## 2022-03-16 DIAGNOSIS — I10 PRIMARY HYPERTENSION: ICD-10-CM

## 2022-03-16 DIAGNOSIS — M54.6 THORACIC SPINE PAIN: Primary | ICD-10-CM

## 2022-03-16 DIAGNOSIS — M54.6 CHRONIC MIDLINE THORACIC BACK PAIN: ICD-10-CM

## 2022-03-16 DIAGNOSIS — E78.00 HIGH CHOLESTEROL: ICD-10-CM

## 2022-03-16 DIAGNOSIS — E11.42 TYPE 2 DIABETES MELLITUS WITH DIABETIC POLYNEUROPATHY, WITHOUT LONG-TERM CURRENT USE OF INSULIN: ICD-10-CM

## 2022-03-16 PROCEDURE — 72072 X-RAY EXAM THORAC SPINE 3VWS: CPT

## 2022-03-16 PROCEDURE — 99214 OFFICE O/P EST MOD 30 MIN: CPT | Performed by: NURSE PRACTITIONER

## 2022-03-16 RX ORDER — METFORMIN HYDROCHLORIDE 500 MG/1
2000 TABLET, EXTENDED RELEASE ORAL
Qty: 360 TABLET | Refills: 1 | Status: SHIPPED | OUTPATIENT
Start: 2022-03-16 | End: 2022-10-18 | Stop reason: SDUPTHER

## 2022-03-16 RX ORDER — FLUTICASONE PROPIONATE 50 MCG
2 SPRAY, SUSPENSION (ML) NASAL DAILY
Qty: 1 G | Refills: 0 | Status: SHIPPED | OUTPATIENT
Start: 2022-03-16 | End: 2022-12-16

## 2022-03-16 RX ORDER — ATORVASTATIN CALCIUM 20 MG/1
20 TABLET, FILM COATED ORAL NIGHTLY
Qty: 90 TABLET | Refills: 1 | Status: SHIPPED | OUTPATIENT
Start: 2022-03-16 | End: 2022-12-16

## 2022-03-16 RX ORDER — DULAGLUTIDE 0.75 MG/.5ML
0.75 INJECTION, SOLUTION SUBCUTANEOUS WEEKLY
Qty: 4 PEN | Refills: 5 | Status: SHIPPED | OUTPATIENT
Start: 2022-03-16 | End: 2022-06-16

## 2022-03-16 RX ORDER — DULOXETIN HYDROCHLORIDE 30 MG/1
30 CAPSULE, DELAYED RELEASE ORAL DAILY
Qty: 90 CAPSULE | Refills: 2 | Status: SHIPPED | OUTPATIENT
Start: 2022-03-16 | End: 2022-10-18

## 2022-03-16 NOTE — PROGRESS NOTES
Follow Up Office Visit      Patient Name: Vickie Bull  : 1955   MRN: [id number]     Chief Complaint:    Chief Complaint   Patient presents with   • Diabetes   • Heartburn   • Hyperlipidemia   • Hypertension   • Hypothyroidism       History of Present Illness: Vickie Bull is a 66 y.o. female who is here today to follow up for  DM2, hyperlipidemia, HTN, hypothyroidism, allergic rhinitis and gerd   Review lab results     C/O-allergies worsening itchy eyes and drainage, taking zyrtec daily      Neuropathy controlled with gabapentin ([facility] provider in [place] [facility]6 months) and cymbalta 90 mg daily     Currently attending pain management for low back pain  Pt c/o mid back pain with radiation around to rib bilaterally for several weeks denies injury      BS-on occasion (have been going up)   Mammogram   Colonoscopy -  Last Eye exam--dr kramer   Foot exam  dr ly   PAP 2018 Dr garcia     Subjective      Review of Systems:   Review of Systems   Constitutional: Positive for fatigue.   HENT: Positive for postnasal drip.    Eyes: Positive for itching.   Respiratory: Negative for cough.    Cardiovascular: Negative for chest pain.   Gastrointestinal: Negative for abdominal pain, diarrhea, nausea and vomiting.   Endocrine: Positive for polydipsia and polyuria. Negative for polyphagia.   Genitourinary: Negative for dysuria.   Musculoskeletal: Negative for myalgias.   Skin: Negative for pallor.   Allergic/Immunologic: Positive for environmental allergies.   Neurological: Negative for dizziness, tremors, seizures, speech difficulty, weakness and headaches.   Psychiatric/Behavioral: Negative for confusion. The patient is not nervous/anxious.         Past Medical History:   Past Medical History:   Diagnosis Date   • Acid reflux    • Anemia, unspecified    • Chronic allergic rhinitis    • Condition not found     Ulcer   • Corns and callus    • Diabetes (HCC)     Unspecified   • Diarrhea  04/15/2014   • GERD (gastroesophageal reflux disease)    • HBP (high blood pressure)    • High cholesterol    • HTN (hypertension)    • Hyperlipemia    • Hypothyroidism    • Ingrown toenail    • Leg pain    • Lumbar disc herniation 11/22/2013    L3-4 very small fat lat disc   • Lumbar pain    • Numbness in feet    • Renal insufficiency 03/16/2021   • Sciatica 11/22/2013    no sig structural compressive lesion   • Seasonal allergies    • Stomach ulcer    • Thyroid disorder        Past Surgical History:   Past Surgical History:   Procedure Laterality Date   • COLONOSCOPY     • ENDOSCOPY     • JOINT REPLACEMENT     • SHOULDER ACROMIOCLAVICULAR JOINT REPAIR     • SHOULDER SURGERY Right 10/2010   • TUBAL ABDOMINAL LIGATION         Family History:   Family History   Problem Relation Age of Onset   • Cancer Mother         unspecified   • Lung cancer Mother    • Diabetes Father         unspecified type   • Prostate cancer Father    • Diabetes Paternal Grandmother         unspecified type   • Heart attack Other    • Diabetes Other    • Diabetes Paternal Aunt         unspecified type   • Diabetes Paternal Uncle         unspecified type       Social History:   Social History     Socioeconomic History   • Marital status:    Tobacco Use   • Smoking status: Never Smoker   • Smokeless tobacco: Never Used   • Tobacco comment: second hand smoke exposure-never   Vaping Use   • Vaping Use: Never used   Substance and Sexual Activity   • Alcohol use: Yes     Comment: rarely; wine and liquor   • Drug use: Never   • Sexual activity: Defer       Medications:     Current Outpatient Medications:   •  atorvastatin (Lipitor) 20 MG tablet, Take 1 tablet by mouth Every Night., Disp: 90 tablet, Rfl: 1  •  DULoxetine (CYMBALTA) 30 MG capsule, Take 1 capsule by mouth Daily., Disp: 90 capsule, Rfl: 2  •  fluticasone (FLONASE) 50 MCG/ACT nasal spray, 2 sprays into the nostril(s) as directed by provider Daily., Disp: 1 g, Rfl: 0  •  metFORMIN  "ER (GLUCOPHAGE-XR) 500 MG 24 hr tablet, Take 4 tablets by mouth Daily With Dinner., Disp: 360 tablet, Rfl: 1  •  ascorbic acid (VITAMIN C) 500 MG tablet, Vitamin C 500 mg oral tablet take 1 tablet by oral route daily   Active, Disp: , Rfl:   •  cetirizine (zyrTEC) 10 MG tablet, cetirizine 10 mg oral tablet take 1 tablet (10 mg) by oral route once daily   Suspended, Disp: , Rfl:   •  Cholecalciferol 50 MCG (2000 UT) capsule, Vitamin D3 2,000 unit oral capsule take 1 capsule by oral route daily   Suspended, Disp: , Rfl:   •  Dulaglutide (Trulicity) 0.75 MG/0.5ML solution pen-injector, Inject 0.75 mg under the skin into the appropriate area as directed 1 (One) Time Per Week., Disp: 4 pen, Rfl: 5  •  DULoxetine (CYMBALTA) 60 MG capsule, Take 1 capsule by mouth Daily., Disp: 90 capsule, Rfl: 2  •  gabapentin (NEURONTIN) 600 MG tablet, gabapentin 600 mg oral tablet take 1 tablet by oral route daily   Active, Disp: , Rfl:   •  levothyroxine (Synthroid) 50 MCG tablet, Take 1 tablet by mouth Daily., Disp: 90 tablet, Rfl: 3  •  lisinopril (PRINIVIL,ZESTRIL) 10 MG tablet, Take 10 mg by mouth Daily., Disp: , Rfl:   •  multivitamin with minerals (MULTIVITAMIN ADULT PO), 1 tablet Daily., Disp: , Rfl:   •  omeprazole (priLOSEC) 20 MG capsule, Take 1 capsule by mouth Daily., Disp: 90 capsule, Rfl: 1  •  Zinc 50 MG capsule, Take  by mouth Daily., Disp: , Rfl:     Allergies:   Allergies   Allergen Reactions   • Cephalexin Hives   • Latex Itching   • Penicillins Hives and Rash           PHQ-2 Total Score: 0   PHQ-9 Total Score: 0     Objective     Physical Exam:  Vital Signs:   Vitals:    03/16/22 0950   BP: 124/70   Pulse: 94   Temp: 96.8 °F (36 °C)   SpO2: 99%   Weight: 101 kg (222 lb)   Height: 162.6 cm (64\")     Body mass index is 38.11 kg/m².     Physical Exam  HENT:      Right Ear: Tympanic membrane normal.      Left Ear: Tympanic membrane normal.      Nose: Nose normal.      Mouth/Throat:      Mouth: Mucous membranes are moist. "      Comments: PND  Eyes:      Conjunctiva/sclera:      Right eye: Right conjunctiva is injected.      Left eye: Left conjunctiva is injected.   Cardiovascular:      Rate and Rhythm: Normal rate and regular rhythm.      Heart sounds: Normal heart sounds. No murmur heard.  Pulmonary:      Effort: Pulmonary effort is normal.      Breath sounds: Normal breath sounds.   Abdominal:      General: Bowel sounds are normal.      Palpations: Abdomen is soft.   Musculoskeletal:      Thoracic back: Tenderness present. No swelling or spasms.        Back:       Right lower leg: No edema.      Left lower leg: No edema.   Lymphadenopathy:      Cervical: No cervical adenopathy.   Skin:     General: Skin is warm and dry.   Neurological:      Mental Status: She is alert.   Psychiatric:         Mood and Affect: Mood normal.         Behavior: Behavior normal.             Assessment / Plan      Assessment/Plan:   Diagnoses and all orders for this visit:    1. Type 2 diabetes mellitus with diabetic polyneuropathy, without long-term current use of insulin (HCC)  -     Hemoglobin A1c; Future  -     Microalbumin / Creatinine Urine Ratio - Urine, Clean Catch; Future  -     Urinalysis With Culture If Indicated -; Future    2. Primary hypertension  -     CBC Auto Differential; Future    3. High cholesterol  -     Comprehensive Metabolic Panel; Future  -     Lipid Panel; Future    4. Seasonal allergic rhinitis, unspecified trigger  -     fluticasone (FLONASE) 50 MCG/ACT nasal spray; 2 sprays into the nostril(s) as directed by provider Daily.  Dispense: 1 g; Refill: 0    5. Acquired hypothyroidism  -     TSH; Future    6. Chronic midline low back pain with bilateral sciatica    7. Gastroesophageal reflux disease without esophagitis    8. Chronic midline thoracic back pain  -     XR Spine Thoracic 3 View; Future    Other orders  -     atorvastatin (Lipitor) 20 MG tablet; Take 1 tablet by mouth Every Night.  Dispense: 90 tablet; Refill: 1  -      "DULoxetine (CYMBALTA) 30 MG capsule; Take 1 capsule by mouth Daily.  Dispense: 90 capsule; Refill: 2  -     metFORMIN ER (GLUCOPHAGE-XR) 500 MG 24 hr tablet; Take 4 tablets by mouth Daily With Dinner.  Dispense: 360 tablet; Refill: 1  -     Dulaglutide (Trulicity) 0.75 MG/0.5ML solution pen-injector; Inject 0.75 mg under the skin into the appropriate area as directed 1 (One) Time Per Week.  Dispense: 4 pen; Refill: 5    Diabetes mellitus type 2 hemoglobin A1c above 7 we will add Trulicity 0.75 mg once weekly continue Metformin decrease carb intake exercise 30 minutes daily recheck in 90 days  Hyperlipidemia LDL above goal recommend to decrease fried foods red meat pork products cheese increase fruits vegetables whole grains exercise 30 minutes daily and weight loss if remains elevated at 90-day follow-up will increase current Lipitor dose from 20 mg to 40 mg liver enzymes normal  Hypertension currently controlled lisinopril 10 mg daily  Hypothyroidism stable on Synthroid 50 mcg daily TSH normal denies palpitations  Seasonal allergies uncontrolled we will continue Zyrtec and add Flonase if symptoms persist would add Singulair at nighttime  Chronic low back pain continue to follow-up with pain management  Reflux currently controlled at this time on Prilosec 20 mg daily will provide refills  Chronic mid back pain will obtain x-ray of thoracic spine call with results further recommendations          Follow Up:   Return in about 3 months (around 6/16/2022).    HANS Ayon    \"Please note that portions of this note were completed with a voice recognition program.\"    "

## 2022-03-16 NOTE — TELEPHONE ENCOUNTER
----- Message from HANS Mancera sent at 3/16/2022  1:12 PM EDT -----     degenerative changes, refer to [facility] patient is current with them need a new referral that says thoracic spine pain

## 2022-03-16 NOTE — TELEPHONE ENCOUNTER
Called and notified patient of results, put in new referral for thoracic spine pain for [facility]

## 2022-03-17 PROBLEM — G89.29 CHRONIC MIDLINE THORACIC BACK PAIN: Status: ACTIVE | Noted: 2022-03-17

## 2022-03-17 PROBLEM — M54.6 CHRONIC MIDLINE THORACIC BACK PAIN: Status: ACTIVE | Noted: 2022-03-17

## 2022-04-08 ENCOUNTER — TRANSCRIBE ORDERS (OUTPATIENT)
Dept: ADMINISTRATIVE | Facility: HOSPITAL | Age: 67
End: 2022-04-08

## 2022-04-08 DIAGNOSIS — M54.14 THORACIC RADICULOPATHY: Primary | ICD-10-CM

## 2022-04-12 ENCOUNTER — TELEPHONE (OUTPATIENT)
Dept: FAMILY MEDICINE CLINIC | Facility: CLINIC | Age: 67
End: 2022-04-12

## 2022-04-12 NOTE — TELEPHONE ENCOUNTER
Caller: Vickie Bull    Relationship: Self    Best call back number: [phone]    What medication are you requesting: FREESTYLE TEST STRIPS     What are your current symptoms: NEED TO CHECK SUGAR LEVELS      If a prescription is needed, what is your preferred pharmacy and phone number: [facility] - [place], [place] - [address] - [phone]  - [phone]     Additional notes: THE TEST STRIPS ARE FOR THE FREESTYLE FREEDOM LITE MACHINE

## 2022-04-13 DIAGNOSIS — E11.42 TYPE 2 DIABETES MELLITUS WITH DIABETIC POLYNEUROPATHY, WITHOUT LONG-TERM CURRENT USE OF INSULIN: Primary | ICD-10-CM

## 2022-04-13 RX ORDER — BLOOD-GLUCOSE METER
KIT MISCELLANEOUS
Qty: 100 EACH | Refills: 2 | Status: SHIPPED | OUTPATIENT
Start: 2022-04-13

## 2022-04-13 RX ORDER — GLUCOSAMINE HCL/CHONDROITIN SU 500-400 MG
1 CAPSULE ORAL DAILY
COMMUNITY
End: 2022-04-13 | Stop reason: SDUPTHER

## 2022-04-28 ENCOUNTER — HOSPITAL ENCOUNTER (OUTPATIENT)
Dept: MRI IMAGING | Facility: HOSPITAL | Age: 67
Discharge: HOME | End: 2022-04-28
Admitting: STUDENT IN AN ORGANIZED HEALTH CARE EDUCATION/TRAINING PROGRAM

## 2022-04-28 DIAGNOSIS — M54.14 THORACIC RADICULOPATHY: ICD-10-CM

## 2022-04-28 PROCEDURE — 72146 MRI CHEST SPINE W/O DYE: CPT

## 2022-05-02 DIAGNOSIS — I10 PRIMARY HYPERTENSION: ICD-10-CM

## 2022-05-02 DIAGNOSIS — E78.00 HIGH CHOLESTEROL: Primary | ICD-10-CM

## 2022-05-02 DIAGNOSIS — E66.01 MORBID (SEVERE) OBESITY DUE TO EXCESS CALORIES: ICD-10-CM

## 2022-05-10 ENCOUNTER — NUTRITION (OUTPATIENT)
Dept: NUTRITION | Facility: HOSPITAL | Age: 67
End: 2022-05-10

## 2022-05-10 VITALS — WEIGHT: 220.24 LBS | HEIGHT: 64 IN | BODY MASS INDEX: 37.6 KG/M2

## 2022-05-10 PROCEDURE — 97802 MEDICAL NUTRITION INDIV IN: CPT

## 2022-05-10 NOTE — CONSULTS
Nutrition Services    Patient Name: Vickie Bull  YOB: 1955  MRN: [id number]  Appointment: 05/10/22 10:22 EDT    Nutrition Assessment      Reason for Assessment Vickie Bull is a 66 y.o. female being seen as initial appointment for hyperlipidemia, hypertension.      H&P:    Past Medical History:   Diagnosis Date   • Acid reflux    • Anemia, unspecified    • Chronic allergic rhinitis    • Condition not found     Ulcer   • Corns and callus    • Diabetes (HCC)     Unspecified   • Diarrhea 04/15/2014   • GERD (gastroesophageal reflux disease)    • HBP (high blood pressure)    • High cholesterol    • HTN (hypertension)    • Hyperlipemia    • Hypothyroidism    • Ingrown toenail    • Leg pain    • Lumbar disc herniation 11/22/2013    L3-4 very small fat lat disc   • Lumbar pain    • Numbness in feet    • Renal insufficiency 03/16/2021   • Sciatica 11/22/2013    no sig structural compressive lesion   • Seasonal allergies    • Stomach ulcer    • Thyroid disorder           Labs/Medications         Pertinent Labs Reviewed.         Invalid input(s): PJ REN      Coronavirus (COVID-19)   Date Value Ref Range Status   10/01/2020 NOT DETECTED NA Final     Comment:     The SARS-CoV-2 assay is a real-time, RT-PCR test intended  for the qualitative detection of nucleic acid from the  SARS-CoV-2 in respiratory specimens from individuals,  testing performed at [facility] reference lab.       Lab Results   Component Value Date    HGBA1C 7.80 (H) 03/10/2022         Pertinent Medications Reviewed.     Nutrition/Diet History         Narrative     Patient arrived to appointment with spouse, Brayan.  Patient concerned about increase of A1c.  Recently, physician increased patient to metformin 2000 mg daily.  Patient has been taking fasting blood glucose recently and it is averaged about 125.  Patient and spouse have been undergoing kitchen reconstruction and have been eating out more of late.  Patient  "also reports she is felt like she has started to have some edema in lower extremities.   Usual Intake  skips breakfast    SpaghettiOs with cheese    Snacks on cheese puffs, candy throughout the day    Fast food for dinner or premade meal    Snacks on ice cream sandwiches, suckers after dinner    Drinks water bottles with flavor packets throughout the day  After receiving a elevated A1c, patient cut out Mountain Dew 3 weeks ago   Factors Affecting Intake  no issues chewing or swallowing   Support System  spouse   Activity Level  sedentary   Motivation/Barriers  patient is currently in preparation stage     Anthropometrics         Current Height, Weight Height: 162.6 cm (64\")  Weight: 99.9 kg (220 lb 3.8 oz)    Current BMI Body mass index is 37.8 kg/m².         Weight Hx  Wt Readings from Last 30 Encounters:   05/10/22 1022 99.9 kg (220 lb 3.8 oz)   03/16/22 0950 101 kg (222 lb)   12/12/21 1920 101 kg (221 lb 12.8 oz)   11/23/21 1226 99.8 kg (220 lb)   11/05/21 1417 99.8 kg (220 lb)   09/16/21 0930 99.8 kg (220 lb)   06/22/21 1406 99.3 kg (219 lb)   03/16/21 0000 98.1 kg (216 lb 4 oz)   01/21/21 0000 99.3 kg (219 lb)   10/05/20 0000 98.9 kg (218 lb)   09/30/20 0000 99 kg (218 lb 4 oz)   09/15/20 0000 99.6 kg (219 lb 8 oz)   08/26/20 0000 99.5 kg (219 lb 4 oz)   06/26/20 0000 54.9 kg (121 lb)   06/18/20 0000 101 kg (223 lb 3 oz)   06/12/20 0000 100 kg (221 lb 6 oz)   05/29/20 0000 101 kg (222 lb 6 oz)   11/06/18 0000 100 kg (220 lb 6 oz)   05/07/18 0000 91.8 kg (202 lb 5 oz)           Wt Change Observation  weight stable     Estimated/Assessed Needs        Calories  1998 kcal (20 kcal/kg)    Protein  79 gPro (0.8 g/kg)    Fluid  1998 ml (20 ml/kg)     Nutrition Diagnosis         PES Altered nutrition related lab values related to lack of prior nutrition related education as evidenced by patient report. and Increasing A1c of 7.8       Nutrition Intervention        RD Action Provided Medical Nutrition Therapy for " "obesity, diabetes   Goals Pt established the following goals:  1.  Increase fruit and vegetable intake by decreasing fast food and \"junk food\" intake  2.  Increase physical activity by walking 2 times daily with spouse when he goes out for walks  3.  Increase water intake by drinking 4 bottles of water daily    Adapted based on patient readiness, skills, resources, culture, and lifestyle    Established intervention with patient collaboration    Offered action strategies and steps to help patient reach nutrition related goals     Medical Nutrition Therapy/Nutrition Education       Learner   Readiness Patient and Family  Eager   Method   Response Explanation, Demonstration and Written Material  Verbalizes understanding      Monitor/Evaluation         Copy of current note sent to referring physician, Follow up with RD PRN and Pt to self-monitor       Electronically signed by:  Frances Sawant RD  05/10/22 10:22 EDT  Pt seen from: [id number]    "

## 2022-06-14 ENCOUNTER — APPOINTMENT (OUTPATIENT)
Dept: NUTRITION | Facility: HOSPITAL | Age: 67
End: 2022-06-14

## 2022-06-15 ENCOUNTER — LAB (OUTPATIENT)
Dept: LAB | Facility: HOSPITAL | Age: 67
End: 2022-06-15

## 2022-06-15 DIAGNOSIS — I10 PRIMARY HYPERTENSION: ICD-10-CM

## 2022-06-15 DIAGNOSIS — E78.00 HIGH CHOLESTEROL: ICD-10-CM

## 2022-06-15 DIAGNOSIS — E03.9 ACQUIRED HYPOTHYROIDISM: ICD-10-CM

## 2022-06-15 DIAGNOSIS — E11.42 TYPE 2 DIABETES MELLITUS WITH DIABETIC POLYNEUROPATHY, WITHOUT LONG-TERM CURRENT USE OF INSULIN: ICD-10-CM

## 2022-06-15 LAB
ALBUMIN SERPL-MCNC: 4.7 G/DL (ref 3.5–5.2)
ALBUMIN/GLOB SERPL: 2 G/DL
ALP SERPL-CCNC: 132 U/L (ref 39–117)
ALT SERPL W P-5'-P-CCNC: 24 U/L (ref 1–33)
ANION GAP SERPL CALCULATED.3IONS-SCNC: 11.6 MMOL/L (ref 5–15)
AST SERPL-CCNC: 22 U/L (ref 1–32)
BASOPHILS # BLD AUTO: 0.06 10*3/MM3 (ref 0–0.2)
BASOPHILS NFR BLD AUTO: 0.7 % (ref 0–1.5)
BILIRUB SERPL-MCNC: 0.3 MG/DL (ref 0–1.2)
BUN SERPL-MCNC: 16 MG/DL (ref 8–23)
BUN/CREAT SERPL: 14.8 (ref 7–25)
CALCIUM SPEC-SCNC: 9.5 MG/DL (ref 8.6–10.5)
CHLORIDE SERPL-SCNC: 102 MMOL/L (ref 98–107)
CHOLEST SERPL-MCNC: 163 MG/DL (ref 0–200)
CO2 SERPL-SCNC: 23.4 MMOL/L (ref 22–29)
CREAT SERPL-MCNC: 1.08 MG/DL (ref 0.57–1)
DEPRECATED RDW RBC AUTO: 37.6 FL (ref 37–54)
EGFRCR SERPLBLD CKD-EPI 2021: 56.4 ML/MIN/1.73
EOSINOPHIL # BLD AUTO: 0.22 10*3/MM3 (ref 0–0.4)
EOSINOPHIL NFR BLD AUTO: 2.6 % (ref 0.3–6.2)
ERYTHROCYTE [DISTWIDTH] IN BLOOD BY AUTOMATED COUNT: 13.3 % (ref 12.3–15.4)
GLOBULIN UR ELPH-MCNC: 2.4 GM/DL
GLUCOSE SERPL-MCNC: 140 MG/DL (ref 65–99)
HBA1C MFR BLD: 7 % (ref 4.8–5.6)
HCT VFR BLD AUTO: 41.1 % (ref 34–46.6)
HDLC SERPL-MCNC: 53 MG/DL (ref 40–60)
HGB BLD-MCNC: 13.1 G/DL (ref 12–15.9)
IMM GRANULOCYTES # BLD AUTO: 0.12 10*3/MM3 (ref 0–0.05)
IMM GRANULOCYTES NFR BLD AUTO: 1.4 % (ref 0–0.5)
LDLC SERPL CALC-MCNC: 81 MG/DL (ref 0–100)
LDLC/HDLC SERPL: 1.42 {RATIO}
LYMPHOCYTES # BLD AUTO: 2.08 10*3/MM3 (ref 0.7–3.1)
LYMPHOCYTES NFR BLD AUTO: 24.2 % (ref 19.6–45.3)
MCH RBC QN AUTO: 25.2 PG (ref 26.6–33)
MCHC RBC AUTO-ENTMCNC: 31.9 G/DL (ref 31.5–35.7)
MCV RBC AUTO: 79 FL (ref 79–97)
MONOCYTES # BLD AUTO: 0.83 10*3/MM3 (ref 0.1–0.9)
MONOCYTES NFR BLD AUTO: 9.7 % (ref 5–12)
NEUTROPHILS NFR BLD AUTO: 5.29 10*3/MM3 (ref 1.7–7)
NEUTROPHILS NFR BLD AUTO: 61.4 % (ref 42.7–76)
NRBC BLD AUTO-RTO: 0.1 /100 WBC (ref 0–0.2)
PLATELET # BLD AUTO: 279 10*3/MM3 (ref 140–450)
PMV BLD AUTO: 10.4 FL (ref 6–12)
POTASSIUM SERPL-SCNC: 5.6 MMOL/L (ref 3.5–5.2)
PROT SERPL-MCNC: 7.1 G/DL (ref 6–8.5)
RBC # BLD AUTO: 5.2 10*6/MM3 (ref 3.77–5.28)
SODIUM SERPL-SCNC: 137 MMOL/L (ref 136–145)
TRIGL SERPL-MCNC: 173 MG/DL (ref 0–150)
TSH SERPL DL<=0.05 MIU/L-ACNC: 1.38 UIU/ML (ref 0.27–4.2)
VLDLC SERPL-MCNC: 29 MG/DL (ref 5–40)
WBC NRBC COR # BLD: 8.6 10*3/MM3 (ref 3.4–10.8)

## 2022-06-15 PROCEDURE — 85025 COMPLETE CBC W/AUTO DIFF WBC: CPT

## 2022-06-15 PROCEDURE — 84443 ASSAY THYROID STIM HORMONE: CPT

## 2022-06-15 PROCEDURE — 83036 HEMOGLOBIN GLYCOSYLATED A1C: CPT

## 2022-06-15 PROCEDURE — 80053 COMPREHEN METABOLIC PANEL: CPT

## 2022-06-15 PROCEDURE — 80061 LIPID PANEL: CPT

## 2022-06-16 ENCOUNTER — OFFICE VISIT (OUTPATIENT)
Dept: FAMILY MEDICINE CLINIC | Facility: CLINIC | Age: 67
End: 2022-06-16

## 2022-06-16 DIAGNOSIS — E78.00 HIGH CHOLESTEROL: ICD-10-CM

## 2022-06-16 DIAGNOSIS — E87.5 HYPERKALEMIA: ICD-10-CM

## 2022-06-16 DIAGNOSIS — I10 PRIMARY HYPERTENSION: ICD-10-CM

## 2022-06-16 DIAGNOSIS — E03.9 ACQUIRED HYPOTHYROIDISM: ICD-10-CM

## 2022-06-16 DIAGNOSIS — E11.42 TYPE 2 DIABETES MELLITUS WITH DIABETIC POLYNEUROPATHY, WITHOUT LONG-TERM CURRENT USE OF INSULIN: ICD-10-CM

## 2022-06-16 DIAGNOSIS — K21.9 GASTROESOPHAGEAL REFLUX DISEASE WITHOUT ESOPHAGITIS: Primary | ICD-10-CM

## 2022-06-16 LAB
ALBUMIN UR-MCNC: 1.5 MG/DL
CREAT UR-MCNC: 290.1 MG/DL
MICROALBUMIN/CREAT UR: 5.2 MG/G

## 2022-06-16 PROCEDURE — 36415 COLL VENOUS BLD VENIPUNCTURE: CPT | Performed by: NURSE PRACTITIONER

## 2022-06-16 PROCEDURE — 82570 ASSAY OF URINE CREATININE: CPT | Performed by: NURSE PRACTITIONER

## 2022-06-16 PROCEDURE — 80053 COMPREHEN METABOLIC PANEL: CPT | Performed by: NURSE PRACTITIONER

## 2022-06-16 PROCEDURE — 82043 UR ALBUMIN QUANTITATIVE: CPT | Performed by: NURSE PRACTITIONER

## 2022-06-16 PROCEDURE — 99214 OFFICE O/P EST MOD 30 MIN: CPT | Performed by: NURSE PRACTITIONER

## 2022-06-16 PROCEDURE — 81001 URINALYSIS AUTO W/SCOPE: CPT | Performed by: NURSE PRACTITIONER

## 2022-06-16 NOTE — PROGRESS NOTES
Follow Up Office Visit      Patient Name: Vickie Bull  : 1955   MRN: [id number]     Chief Complaint:    Chief Complaint   Patient presents with   • Diabetes   • Heartburn   • Hypertension   • Hyperlipidemia   • Hypothyroidism       History of Present Illness: Vickie Bull is a 66 y.o. female who is here today to follow up for a 3 month follow up on DM2,  hypertension, hyperlipidemia, hypothyroidism, gerd   Review lab results     Eye exam-  Dr. Fields  Foot exam- - Dr. Balderrama. Patient scheduled for this month  mammogram- 2021  dexa- 2020  Pap-  Dr. Miller  Colonoscopy-     C/o Patient was prescribed Trulicity and could only tolerate 1 dose with n/v, it made her reflux flair up and she had diarrhea.  Decreased carb intake, loss of 7 lbs         Subjective      Review of Systems:   Review of Systems   Constitutional: Negative for fever.   Respiratory: Negative for shortness of breath.    Cardiovascular: Negative for chest pain.   Gastrointestinal: Negative for abdominal pain, diarrhea, nausea and vomiting.   Endocrine: Negative for polydipsia and polyuria.   Genitourinary: Negative for dysuria.        Past Medical History:   Past Medical History:   Diagnosis Date   • Acid reflux    • Anemia, unspecified    • Chronic allergic rhinitis    • Condition not found     Ulcer   • Corns and callus    • Diabetes (HCC)     Unspecified   • Diarrhea 04/15/2014   • GERD (gastroesophageal reflux disease)    • HBP (high blood pressure)    • High cholesterol    • HTN (hypertension)    • Hyperlipemia    • Hypothyroidism    • Ingrown toenail    • Leg pain    • Lumbar disc herniation 2013    L3-4 very small fat lat disc   • Lumbar pain    • Numbness in feet    • Renal insufficiency 2021   • Sciatica 2013    no sig structural compressive lesion   • Seasonal allergies    • Stomach ulcer    • Thyroid disorder        Past Surgical History:   Past Surgical History:   Procedure  Laterality Date   • COLONOSCOPY     • ENDOSCOPY     • JOINT REPLACEMENT     • SHOULDER ACROMIOCLAVICULAR JOINT REPAIR     • SHOULDER SURGERY Right 10/2010   • TUBAL ABDOMINAL LIGATION         Family History:   Family History   Problem Relation Age of Onset   • Cancer Mother         unspecified   • Lung cancer Mother    • Diabetes Father         unspecified type   • Prostate cancer Father    • Diabetes Paternal Grandmother         unspecified type   • Heart attack Other    • Diabetes Other    • Diabetes Paternal Aunt         unspecified type   • Diabetes Paternal Uncle         unspecified type       Social History:   Social History     Socioeconomic History   • Marital status:    Tobacco Use   • Smoking status: Never Smoker   • Smokeless tobacco: Never Used   • Tobacco comment: second hand smoke exposure-never   Vaping Use   • Vaping Use: Never used   Substance and Sexual Activity   • Alcohol use: Yes     Comment: rarely; wine and liquor   • Drug use: Never   • Sexual activity: Defer       Medications:     Current Outpatient Medications:   •  ascorbic acid (VITAMIN C) 500 MG tablet, Vitamin C 500 mg oral tablet take 1 tablet by oral route daily   Active, Disp: , Rfl:   •  atorvastatin (Lipitor) 20 MG tablet, Take 1 tablet by mouth Every Night., Disp: 90 tablet, Rfl: 1  •  cetirizine (zyrTEC) 10 MG tablet, cetirizine 10 mg oral tablet take 1 tablet (10 mg) by oral route once daily   Suspended, Disp: , Rfl:   •  Cholecalciferol 50 MCG (2000 UT) capsule, Vitamin D3 2,000 unit oral capsule take 1 capsule by oral route daily   Suspended, Disp: , Rfl:   •  DULoxetine (CYMBALTA) 30 MG capsule, Take 1 capsule by mouth Daily., Disp: 90 capsule, Rfl: 2  •  DULoxetine (CYMBALTA) 60 MG capsule, Take 1 capsule by mouth Daily., Disp: 90 capsule, Rfl: 2  •  gabapentin (NEURONTIN) 600 MG tablet, gabapentin 600 mg oral tablet take 1 tablet by oral route daily   Active, Disp: , Rfl:   •  glucose blood (FREESTYLE LITE) test  "strip, Use one test strip daily to check blood sugar levels., Disp: 100 each, Rfl: 2  •  levothyroxine (Synthroid) 50 MCG tablet, Take 1 tablet by mouth Daily., Disp: 90 tablet, Rfl: 3  •  lisinopril (PRINIVIL,ZESTRIL) 10 MG tablet, Take 10 mg by mouth Daily., Disp: , Rfl:   •  metFORMIN ER (GLUCOPHAGE-XR) 500 MG 24 hr tablet, Take 4 tablets by mouth Daily With Dinner., Disp: 360 tablet, Rfl: 1  •  multivitamin with minerals tablet tablet, 1 tablet Daily., Disp: , Rfl:   •  omeprazole (priLOSEC) 20 MG capsule, Take 1 capsule by mouth Daily., Disp: 90 capsule, Rfl: 1  •  Zinc 50 MG capsule, Take  by mouth Daily., Disp: , Rfl:   •  fluticasone (FLONASE) 50 MCG/ACT nasal spray, 2 sprays into the nostril(s) as directed by provider Daily., Disp: 1 g, Rfl: 0    Allergies:   Allergies   Allergen Reactions   • Cephalexin Hives   • Latex Itching   • Penicillins Hives and Rash         Objective     Physical Exam:  Vital Signs:   Vitals:    06/16/22 1606 06/16/22 1649   BP: 107/62    Pulse: 103 88   Temp: 97.7 °F (36.5 °C)    SpO2: 96%    Weight: 97.5 kg (215 lb)    Height: 162.6 cm (64\")      Body mass index is 36.9 kg/m².     Physical Exam  HENT:      Right Ear: Tympanic membrane normal.      Left Ear: Tympanic membrane normal.      Nose: Nose normal.      Mouth/Throat:      Mouth: Mucous membranes are moist.   Eyes:      Conjunctiva/sclera: Conjunctivae normal.   Neck:      Vascular: No carotid bruit.   Cardiovascular:      Rate and Rhythm: Normal rate and regular rhythm.      Heart sounds: Normal heart sounds. No murmur heard.  Pulmonary:      Effort: Pulmonary effort is normal.      Breath sounds: Normal breath sounds.   Abdominal:      General: Bowel sounds are normal.      Palpations: Abdomen is soft.   Musculoskeletal:      Right lower leg: No edema.      Left lower leg: No edema.   Skin:     General: Skin is warm and dry.   Neurological:      Mental Status: She is alert.   Psychiatric:         Mood and Affect: Mood " "normal.         Behavior: Behavior normal.             Assessment / Plan      Assessment/Plan:   Diagnoses and all orders for this visit:    1. Gastroesophageal reflux disease without esophagitis (Primary)    2. Type 2 diabetes mellitus with diabetic polyneuropathy, without long-term current use of insulin (HCC)  -     CBC Auto Differential; Future  -     Comprehensive Metabolic Panel; Future  -     Hemoglobin A1c; Future  -     Microalbumin / Creatinine Urine Ratio - Urine, Clean Catch; Future  -     Urinalysis With Culture If Indicated -; Future  -     Microalbumin / Creatinine Urine Ratio - Urine, Clean Catch  -     Urinalysis With Culture If Indicated - Urine, Clean Catch  -     Urinalysis, Microscopic Only - Urine, Clean Catch    3. Primary hypertension    4. Acquired hypothyroidism  -     TSH; Future    5. High cholesterol  -     Lipid Panel; Future    6. Hyperkalemia  -     Comprehensive Metabolic Panel       Diabetes mellitus type 2 currently controlled hemoglobin A1c 7 continue metformin at this time  Hypertension currently controlled lisinopril will provide refills  Hyperlipidemia LDL above goal on Lipitor patient would like to try diet and exercise first we will recheck at 6-month follow-up remains elevated will increase Lipitor to 40 mg daily  Hypothyroidism TSH normal stable on current dose Synthroid 50 mcg daily  Reflux currently controlled with omeprazole 20 mg daily  Hyperkalemia will obtain CMP to monitor if remains elevated will start hydrochlorothiazide and decrease lisinopril to 5 mg daily        Follow Up:   Return in about 6 months (around 12/16/2022).    Erwin Ascencio, HANS    \"Please note that portions of this note were completed with a voice recognition program.\"    "

## 2022-06-17 VITALS
TEMPERATURE: 97.7 F | DIASTOLIC BLOOD PRESSURE: 62 MMHG | HEIGHT: 64 IN | BODY MASS INDEX: 36.7 KG/M2 | HEART RATE: 88 BPM | WEIGHT: 215 LBS | SYSTOLIC BLOOD PRESSURE: 107 MMHG | OXYGEN SATURATION: 96 %

## 2022-06-17 LAB
ALBUMIN SERPL-MCNC: 4.6 G/DL (ref 3.5–5.2)
ALBUMIN/GLOB SERPL: 1.8 G/DL
ALP SERPL-CCNC: 129 U/L (ref 39–117)
ALT SERPL W P-5'-P-CCNC: 25 U/L (ref 1–33)
ANION GAP SERPL CALCULATED.3IONS-SCNC: 18.1 MMOL/L (ref 5–15)
AST SERPL-CCNC: 19 U/L (ref 1–32)
BACTERIA UR QL AUTO: NORMAL /HPF
BILIRUB SERPL-MCNC: 0.3 MG/DL (ref 0–1.2)
BILIRUB UR QL STRIP: NEGATIVE
BUN SERPL-MCNC: 23 MG/DL (ref 8–23)
BUN/CREAT SERPL: 17 (ref 7–25)
CALCIUM SPEC-SCNC: 9.6 MG/DL (ref 8.6–10.5)
CHLORIDE SERPL-SCNC: 98 MMOL/L (ref 98–107)
CLARITY UR: CLEAR
CO2 SERPL-SCNC: 22.9 MMOL/L (ref 22–29)
COLOR UR: YELLOW
CREAT SERPL-MCNC: 1.35 MG/DL (ref 0.57–1)
EGFRCR SERPLBLD CKD-EPI 2021: 43.2 ML/MIN/1.73
GLOBULIN UR ELPH-MCNC: 2.6 GM/DL
GLUCOSE SERPL-MCNC: 112 MG/DL (ref 65–99)
GLUCOSE UR STRIP-MCNC: NEGATIVE MG/DL
HGB UR QL STRIP.AUTO: NEGATIVE
HYALINE CASTS UR QL AUTO: NORMAL /LPF
KETONES UR QL STRIP: ABNORMAL
LEUKOCYTE ESTERASE UR QL STRIP.AUTO: ABNORMAL
NITRITE UR QL STRIP: NEGATIVE
PH UR STRIP.AUTO: 5.5 [PH] (ref 5–8)
POTASSIUM SERPL-SCNC: 5 MMOL/L (ref 3.5–5.2)
PROT SERPL-MCNC: 7.2 G/DL (ref 6–8.5)
PROT UR QL STRIP: ABNORMAL
RBC # UR STRIP: NORMAL /HPF
REF LAB TEST METHOD: NORMAL
SODIUM SERPL-SCNC: 139 MMOL/L (ref 136–145)
SP GR UR STRIP: >=1.03 (ref 1–1.03)
SQUAMOUS #/AREA URNS HPF: NORMAL /HPF
UROBILINOGEN UR QL STRIP: ABNORMAL
WBC # UR STRIP: NORMAL /HPF

## 2022-06-22 ENCOUNTER — OFFICE VISIT (OUTPATIENT)
Dept: PODIATRY | Facility: CLINIC | Age: 67
End: 2022-06-22

## 2022-06-22 VITALS
HEIGHT: 64 IN | TEMPERATURE: 97.1 F | OXYGEN SATURATION: 95 % | BODY MASS INDEX: 37.39 KG/M2 | WEIGHT: 219 LBS | SYSTOLIC BLOOD PRESSURE: 143 MMHG | HEART RATE: 90 BPM | DIASTOLIC BLOOD PRESSURE: 60 MMHG

## 2022-06-22 DIAGNOSIS — G62.9 NEUROPATHY: Primary | ICD-10-CM

## 2022-06-22 DIAGNOSIS — E11.8 DIABETIC FOOT: ICD-10-CM

## 2022-06-22 DIAGNOSIS — E11.9 NON-INSULIN DEPENDENT TYPE 2 DIABETES MELLITUS: ICD-10-CM

## 2022-06-22 PROCEDURE — 99213 OFFICE O/P EST LOW 20 MIN: CPT | Performed by: PODIATRIST

## 2022-06-22 RX ORDER — AMMONIUM LACTATE 12 G/100G
LOTION TOPICAL 2 TIMES DAILY
Qty: 225 G | Refills: 11 | Status: SHIPPED | OUTPATIENT
Start: 2022-06-22

## 2022-06-22 NOTE — PROGRESS NOTES
"    [facility] - PODIATRY    Today's Date: 06/22/22    Patient Name: Vickie Bull  MRN: [id number]  CSN: [id number]  PCP: Nguyen Ascencio APRN, Last PCP Visit: 16 June 2022  Referring Provider: No ref. provider found    SUBJECTIVE     Chief Complaint   Patient presents with   • Left Foot - Diabetes, Follow-up, Annual Exam   • Right Foot - Diabetes, Follow-up, Annual Exam     HPI: Vickie Bull, a 66 y.o.female, comes presents to clinic for a diabetic foot evaluation.    New, Established, New Problem: Established    Location:      Duration:      Onset: Insidious    Nature: Well controlled    Stable, worsening, improving: Stable    Aggravating factors:  Patient relates pain is aggravated by shoe gear and ambulation.      Patient controlling diabetes via: NIDDM    Patient denies any fevers, chills, nausea, vomiting, shortness of breathe, nor any other constitutional signs nor symptoms.    No other pedal complaints at this time.    Patient states her last blood sugar at home was \"140's\".    Medical changes: Work-up for low back issues.    Patient is for refill of AmLactin lotion due to history of dry skin.    Past Medical History:   Diagnosis Date   • Acid reflux    • Anemia, unspecified    • Chronic allergic rhinitis    • Condition not found     Ulcer   • Corns and callus    • Diabetes (HCC)     Unspecified   • Diarrhea 04/15/2014   • GERD (gastroesophageal reflux disease)    • HBP (high blood pressure)    • High cholesterol    • HTN (hypertension)    • Hyperlipemia    • Hypothyroidism    • Ingrown toenail    • Leg pain    • Lumbar disc herniation 11/22/2013    L3-4 very small fat lat disc   • Lumbar pain    • Numbness in feet    • Renal insufficiency 03/16/2021   • Sciatica 11/22/2013    no sig structural compressive lesion   • Seasonal allergies    • Stomach ulcer    • Thyroid disorder      Past Surgical History:   Procedure Laterality Date   • COLONOSCOPY     • ENDOSCOPY     • JOINT " REPLACEMENT     • SHOULDER ACROMIOCLAVICULAR JOINT REPAIR     • SHOULDER SURGERY Right 10/2010   • TUBAL ABDOMINAL LIGATION       Family History   Problem Relation Age of Onset   • Cancer Mother         unspecified   • Lung cancer Mother    • Diabetes Father         unspecified type   • Prostate cancer Father    • Diabetes Paternal Grandmother         unspecified type   • Heart attack Other    • Diabetes Other    • Diabetes Paternal Aunt         unspecified type   • Diabetes Paternal Uncle         unspecified type     Social History     Socioeconomic History   • Marital status:    Tobacco Use   • Smoking status: Never Smoker   • Smokeless tobacco: Never Used   • Tobacco comment: second hand smoke exposure-never   Vaping Use   • Vaping Use: Never used   Substance and Sexual Activity   • Alcohol use: Yes     Comment: rarely; wine and liquor   • Drug use: Never   • Sexual activity: Defer     Allergies   Allergen Reactions   • Cephalexin Hives   • Latex Itching   • Penicillins Hives and Rash     Current Outpatient Medications   Medication Sig Dispense Refill   • atorvastatin (Lipitor) 20 MG tablet Take 1 tablet by mouth Every Night. 90 tablet 1   • cetirizine (zyrTEC) 10 MG tablet cetirizine 10 mg oral tablet take 1 tablet (10 mg) by oral route once daily   Suspended     • Cholecalciferol 50 MCG (2000 UT) capsule Vitamin D3 2,000 unit oral capsule take 1 capsule by oral route daily   Suspended     • DULoxetine (CYMBALTA) 30 MG capsule Take 1 capsule by mouth Daily. 90 capsule 2   • DULoxetine (CYMBALTA) 60 MG capsule Take 1 capsule by mouth Daily. 90 capsule 2   • gabapentin (NEURONTIN) 600 MG tablet gabapentin 600 mg oral tablet take 1 tablet by oral route daily   Active     • glucose blood (FREESTYLE LITE) test strip Use one test strip daily to check blood sugar levels. 100 each 2   • levothyroxine (Synthroid) 50 MCG tablet Take 1 tablet by mouth Daily. 90 tablet 3   • lisinopril (PRINIVIL,ZESTRIL) 10 MG tablet  Take 10 mg by mouth Daily.     • metFORMIN ER (GLUCOPHAGE-XR) 500 MG 24 hr tablet Take 4 tablets by mouth Daily With Dinner. 360 tablet 1   • omeprazole (priLOSEC) 20 MG capsule Take 1 capsule by mouth Daily. 90 capsule 1   • ammonium lactate (AmLactin) 12 % lotion Apply  topically to the appropriate area as directed 2 (Two) Times a Day. 225 g 11   • ascorbic acid (VITAMIN C) 500 MG tablet Vitamin C 500 mg oral tablet take 1 tablet by oral route daily   Active     • fluticasone (FLONASE) 50 MCG/ACT nasal spray 2 sprays into the nostril(s) as directed by provider Daily. 1 g 0   • multivitamin with minerals tablet tablet 1 tablet Daily.     • Zinc 50 MG capsule Take  by mouth Daily.       No current facility-administered medications for this visit.     Review of Systems   Constitutional: Negative.    Neurological: Positive for numbness.   All other systems reviewed and are negative.      OBJECTIVE     Vitals:    06/22/22 1306   BP: 143/60   Pulse: 90   Temp: 97.1 °F (36.2 °C)   SpO2: 95%       Body mass index is 37.59 kg/m².    Lab Results   Component Value Date    HGBA1C 7.00 (H) 06/15/2022       Lab Results   Component Value Date    GLUCOSE 112 (H) 06/16/2022    CALCIUM 9.6 06/16/2022     06/16/2022    K 5.0 06/16/2022    CO2 22.9 06/16/2022    CL 98 06/16/2022    BUN 23 06/16/2022    CREATININE 1.35 (H) 06/16/2022    EGFRIFNONA 36 (L) 09/10/2021    BCR 17.0 06/16/2022    ANIONGAP 18.1 (H) 06/16/2022       Patient seen in no apparent distress.      PHYSICAL EXAM:     Foot/Ankle Exam:       General:   Diabetic Foot Exam Performed    Appearance: obesity    Orientation: AAOx3    Affect: appropriate    Gait: unimpaired    Shoe Gear:  Casual shoes    VASCULAR      Right Foot Vascularity   Normal vascular exam    Dorsalis pedis:  2+  Posterior tibial:  2+  Skin Temperature: warm    Edema Grading:  None  CFT:  < 3 seconds  Pedal Hair Growth:  Present  Varicosities: none       Left Foot Vascularity   Normal vascular  exam    Dorsalis pedis:  2+  Posterior tibial:  2+  Skin Temperature: warm    Edema Grading:  None  CFT:  < 3 seconds  Pedal Hair Growth:  Present  Varicosities: none        NEUROLOGIC     Right Foot Neurologic   Light touch sensation:  Diminished  Vibratory sensation:  Diminished  Hot/Cold sensation: diminished    Protective Sensation using Bend-Annette Monofilament:  5     Left Foot Neurologic   Light touch sensation:  Diminished  Vibratory sensation:  Diminished  Hot/cold sensation: diminished    Protective Sensation using Bend-Annette Monofilament:  5     MUSCULOSKELETAL      Right Foot Musculoskeletal   Ecchymosis:  None  Tenderness: none    Arch:  Normal     Left Foot Musculoskeletal   Ecchymosis:  None  Tenderness: none    Arch:  Normal     MUSCLE STRENGTH     Right Foot Muscle Strength   Foot dorsiflexion:  4  Foot plantar flexion:  4  Foot inversion:  4  Foot eversion:  4     Left Foot Muscle Strength   Foot dorsiflexion:  4  Foot plantar flexion:  4  Foot inversion:  4  Foot eversion:  4     DERMATOLOGIC     Right Foot Dermatologic   Skin: skin intact    Nails: normal       Left Foot Dermatologic   Skin: skin intact    Nails: normal        ASSESSMENT/PLAN     Diagnoses and all orders for this visit:    1. Neuropathy (Primary)    2. Diabetic foot (HCC)  -     ammonium lactate (AmLactin) 12 % lotion; Apply  topically to the appropriate area as directed 2 (Two) Times a Day.  Dispense: 225 g; Refill: 11    3. Non-insulin dependent type 2 diabetes mellitus (HCC)        Comprehensive lower extremity examination and evaluation was performed.    Discussed findings and treatment plan including risks, benefits, and treatment options with patient in detail. Patient agreed with treatment plan.    Diabetic foot exam performed and documented this date, compliant with [facility] required standards. Detail of findings as noted in physical exam.  Lower extremity Neurologic exam for diabetic patient performed and  documented this date, compliant with PQRS required standards. Detail of findings as noted in physical exam.  Advised patient importance of good routine lower extremity hygiene. Advised patient importance of evaluating for intact skin and pain free nail borders.  Advised patient to use mirror to evaluate plantar/ soles of feet for better visualization. Advised patient monitor and phone office to be seen if any cracking to skin, open lesions, painful nail borders or if nails become elongated prior to next visit. Advised patient importance of daily cleansing of lower extremities, followed by good skin cream to maintain normal hydration of skin. Also advised patient importance of close daily monitoring of blood sugar. Advised to regulate diet and medications to maintain control of blood sugar in optimal range. Contact primary care provider if difficulties maintaining blood sugar levels.  Advised Patient of presence of Diabetes Mellitus condition.  Advised Patient risk of progression and worsening or improvement, then return of condition.  Will monitor condition for any change in future. Treat with most appropriate treatment pending status of condition.  Counseled and advised patient extensively on nature and ramifications of diabetes. Standard instructions given to patient for good diabetic foot care and maintenance. Advised importance of careful monitoring to avoid break down and complications secondary to diabetes. Advised patient importance of strict maintenance of blood sugar control. Advised patient of possible ominous results from neglect of condition, i.e.: amputation/ loss of digits, feet and legs, or even death.  Patient states understands counseling, will monitor closely, continue good hygiene and routine diabetic foot care. Patient will contact office is questions or problems.      An After Visit Summary was printed and given to the patient at discharge, including (if requested) any available  informative/educational handouts regarding diagnosis, treatment, or medications. All questions were answered to patient/family satisfaction. Should symptoms fail to improve or worsen they agree to call or return to clinic or to go to the Emergency Department. Discussed the importance of following up with any needed screening tests/labs/specialist appointments and any requested follow-up recommended by me today. Importance of maintaining follow-up discussed and patient accepts that missed appointments can delay diagnosis and potentially lead to worsening of conditions.    Return in about 1 year (around 6/22/2023) for Podiatry Diabetic Foot Exam., or sooner if acute issues arise.    This document has been electronically signed by Eric Ibanez DPM on June 22, 2022 13:46 EDT

## 2022-06-28 ENCOUNTER — NUTRITION (OUTPATIENT)
Dept: NUTRITION | Facility: HOSPITAL | Age: 67
End: 2022-06-28

## 2022-06-28 VITALS — HEIGHT: 64 IN | BODY MASS INDEX: 37.34 KG/M2 | WEIGHT: 218.7 LBS

## 2022-06-28 PROCEDURE — 97803 MED NUTRITION INDIV SUBSEQ: CPT

## 2022-06-28 NOTE — CONSULTS
Nutrition Services    Patient Name: Vickie Bull  YOB: 1955  MRN: [id number]  Appointment: 06/28/22 10:10 EDT    Nutrition Assessment      Reason for Assessment Vickie Bull is a 66 y.o. female being seen as follow-up appointment for Weight management and DM.      H&P:    Past Medical History:   Diagnosis Date   • Acid reflux    • Anemia, unspecified    • Chronic allergic rhinitis    • Condition not found     Ulcer   • Corns and callus    • Diabetes (HCC)     Unspecified   • Diarrhea 04/15/2014   • GERD (gastroesophageal reflux disease)    • HBP (high blood pressure)    • High cholesterol    • HTN (hypertension)    • Hyperlipemia    • Hypothyroidism    • Ingrown toenail    • Leg pain    • Lumbar disc herniation 11/22/2013    L3-4 very small fat lat disc   • Lumbar pain    • Numbness in feet    • Renal insufficiency 03/16/2021   • Sciatica 11/22/2013    no sig structural compressive lesion   • Seasonal allergies    • Stomach ulcer    • Thyroid disorder           Labs/Medications         Pertinent Labs Reviewed.         Invalid input(s): PJ REN      Coronavirus (COVID-19)   Date Value Ref Range Status   10/01/2020 NOT DETECTED NA Final     Comment:     The SARS-CoV-2 assay is a real-time, RT-PCR test intended  for the qualitative detection of nucleic acid from the  SARS-CoV-2 in respiratory specimens from individuals,  testing performed at [facility] reference lab.       Lab Results   Component Value Date    HGBA1C 7.00 (H) 06/15/2022         Pertinent Medications Reviewed.     Nutrition/Diet History         Narrative     Pt arrive to appt with spouse, Brayan.  Pt reports she hasn't changed as much as planned, but has noticed a shift in mentality. Pt thinks more about adding vegetables to meals. Has not been walking d/t heat nor drinking water. Pt and spouse feel they eat very quickly and therefore do not have time to register what they've eaten.   Kitchen renovation continues.  "Kitchen table is too full to eat at, pt and spouse eat in separate locations and holler back and forth to one another.    Usual Intake Skips breakfast  Has water or regular soda    Bologna and cheese sandwich with cheese and miracle whip    Snacks on potato chips, fritos, chips and dip    Grilled chicken or wagyu burger (no sides)   Factors Affecting Intake No issues chewing or swallowing   Support System Spouse   Activity Level Sedentary   Motivation/Barriers Pt is in contemplation stage     Anthropometrics         Current Height, Weight Height: 162.6 cm (64\")  Weight: 99.2 kg (218 lb 11.1 oz)    Current BMI Body mass index is 37.54 kg/m².         Weight Hx  Wt Readings from Last 30 Encounters:   06/28/22 1009 99.2 kg (218 lb 11.1 oz)   06/22/22 1306 99.3 kg (219 lb)   06/16/22 1606 97.5 kg (215 lb)   05/10/22 1022 99.9 kg (220 lb 3.8 oz)   03/16/22 0950 101 kg (222 lb)   12/12/21 1920 101 kg (221 lb 12.8 oz)   11/23/21 1226 99.8 kg (220 lb)   11/05/21 1417 99.8 kg (220 lb)   09/16/21 0930 99.8 kg (220 lb)   06/22/21 1406 99.3 kg (219 lb)   03/16/21 0000 98.1 kg (216 lb 4 oz)   01/21/21 0000 99.3 kg (219 lb)   10/05/20 0000 98.9 kg (218 lb)   09/30/20 0000 99 kg (218 lb 4 oz)   09/15/20 0000 99.6 kg (219 lb 8 oz)   08/26/20 0000 99.5 kg (219 lb 4 oz)   06/26/20 0000 54.9 kg (121 lb)   06/18/20 0000 101 kg (223 lb 3 oz)   06/12/20 0000 100 kg (221 lb 6 oz)   05/29/20 0000 101 kg (222 lb 6 oz)   11/06/18 0000 100 kg (220 lb 6 oz)   05/07/18 0000 91.8 kg (202 lb 5 oz)           Wt Change Observation Wt stable x 1 month     Estimated/Assessed Needs        Calories 1998 kcal (20 kcal/kg)    Protein 79 gPro ( g/kg)    Fluid 1998 ml (20 ml/kg)     Nutrition Diagnosis         PES Altered nutrition related lab values related to lack of prior nutrition related education as evidenced by patient report.       Nutrition Intervention        RD Action Provided Medical Nutrition Therapy for obesity, diabetes   Goals Pt established " the following goals:  1. Increase mindfullness of eating by sitting together at the table for meals  2. Increase water intake by purchasing a time labeled water bottle to drink, or set an alarm each hour to drink 1/2 bottle of water.  3. Increase vegetable intake by having with dinner 5/7 nights weekly  4. Think about physical activity and how to make it fit in schedule    Adapted based on patient readiness, skills, resources, culture, and lifestyle    Established intervention with patient collaboration    Offered action strategies and steps to help patient reach nutrition related goals     Medical Nutrition Therapy/Nutrition Education       Learner   Readiness Patient and Family  Eager   Method   Response Explanation and Teachback  Verbalizes understanding      Monitor/Evaluation         Copy of current note sent to referring physician, Follow up with NISHI PRN and Pt to self-monitor       Electronically signed by:  Frances Sawant RD  06/28/22 10:10 EDT  Pt seen from: [id number]

## 2022-08-24 ENCOUNTER — HOSPITAL ENCOUNTER (OUTPATIENT)
Dept: NUTRITION | Facility: HOSPITAL | Age: 67
Discharge: HOME | End: 2022-08-24
Admitting: NURSE PRACTITIONER

## 2022-08-24 VITALS — WEIGHT: 213.63 LBS | HEIGHT: 64 IN | BODY MASS INDEX: 36.47 KG/M2

## 2022-08-24 PROCEDURE — 97803 MED NUTRITION INDIV SUBSEQ: CPT

## 2022-08-24 NOTE — CONSULTS
Nutrition Services    Patient Name: Vickie Bull  YOB: 1955  MRN: [id number]  Appointment: 08/24/22 14:48 EDT    Nutrition Assessment      Reason for Assessment Vickie Bull is a 66 y.o. female being seen as follow-up appointment for DM.      H&P:    Past Medical History:   Diagnosis Date   • Acid reflux    • Anemia, unspecified    • Chronic allergic rhinitis    • Condition not found     Ulcer   • Corns and callus    • Diabetes (HCC)     Unspecified   • Diarrhea 04/15/2014   • GERD (gastroesophageal reflux disease)    • HBP (high blood pressure)    • High cholesterol    • HTN (hypertension)    • Hyperlipemia    • Hypothyroidism    • Ingrown toenail    • Leg pain    • Lumbar disc herniation 11/22/2013    L3-4 very small fat lat disc   • Lumbar pain    • Numbness in feet    • Renal insufficiency 03/16/2021   • Sciatica 11/22/2013    no sig structural compressive lesion   • Seasonal allergies    • Stomach ulcer    • Thyroid disorder           Labs/Medications         Pertinent Labs Reviewed.         Invalid input(s): PJ REN      Coronavirus (COVID-19)   Date Value Ref Range Status   10/01/2020 NOT DETECTED NA Final     Comment:     The SARS-CoV-2 assay is a real-time, RT-PCR test intended  for the qualitative detection of nucleic acid from the  SARS-CoV-2 in respiratory specimens from individuals,  testing performed at [facility] reference lab.       Lab Results   Component Value Date    HGBA1C 7.00 (H) 06/15/2022         Pertinent Medications Reviewed.     Nutrition/Diet History         Narrative     Pt reports the past month has not gone to plan. Pt has returned to drinking mountain dew twice daily. Has decreased intake of water. Remains sedentary. Has increased intake of vegetables and fruit.   Pt feels spouse is not helping her achieve her goals.   Usual Intake Cereal or poptarts    A sandwich    Watermelon, ice cream, cheese popcorn, tootsie roll pops    Drinks Mountain  "Dew, and water   Factors Affecting Intake No issues chewing or swallowing   Support System Self   Activity Level Sedentary   Motivation/Barriers Pt is in contemplation stage     Anthropometrics         Current Height, Weight Height: 162.6 cm (64\")  Weight: 96.9 kg (213 lb 10 oz)    Current BMI Body mass index is 36.67 kg/m².         Weight Hx  Wt Readings from Last 30 Encounters:   08/24/22 1448 96.9 kg (213 lb 10 oz)   06/28/22 1009 99.2 kg (218 lb 11.1 oz)   06/22/22 1306 99.3 kg (219 lb)   06/16/22 1606 97.5 kg (215 lb)   05/10/22 1022 99.9 kg (220 lb 3.8 oz)   03/16/22 0950 101 kg (222 lb)   12/12/21 1920 101 kg (221 lb 12.8 oz)   11/23/21 1226 99.8 kg (220 lb)   11/05/21 1417 99.8 kg (220 lb)   09/16/21 0930 99.8 kg (220 lb)   06/22/21 1406 99.3 kg (219 lb)   03/16/21 0000 98.1 kg (216 lb 4 oz)   01/21/21 0000 99.3 kg (219 lb)   10/05/20 0000 98.9 kg (218 lb)   09/30/20 0000 99 kg (218 lb 4 oz)   09/15/20 0000 99.6 kg (219 lb 8 oz)   08/26/20 0000 99.5 kg (219 lb 4 oz)   06/26/20 0000 54.9 kg (121 lb)   06/18/20 0000 101 kg (223 lb 3 oz)   06/12/20 0000 100 kg (221 lb 6 oz)   05/29/20 0000 101 kg (222 lb 6 oz)   11/06/18 0000 100 kg (220 lb 6 oz)   05/07/18 0000 91.8 kg (202 lb 5 oz)           Wt Change Observation 7lb wt loss in 1 year     Estimated/Assessed Needs        Calories 1998 kcal (20 kcal/kg)    Protein 79 gPro (0.8 g/kg)    Fluid 1998 ml (20 ml/kg)     Nutrition Diagnosis         PES Overweight/Obesity related to limited adherence to nutrition recommendations as evidenced by patient report.       Nutrition Intervention        RD Action Provided Medical Nutrition Therapy for Obesity, DM   Goals Pt established the following goals:  1. Increase physical activity by walking every morning six days per week  2. Increase water intake, decrease sugar sweetened beverage intake  3. Increase protein by adding protein shake to evening snack    Adapted based on patient readiness, skills, resources, culture, and " lifestyle    Established intervention with patient collaboration    Offered action strategies and steps to help patient reach nutrition related goals     Medical Nutrition Therapy/Nutrition Education       Learner   Readiness Patient  Eager   Method   Response Explanation  Verbalizes understanding      Monitor/Evaluation         Copy of current note sent to referring physician, Follow up with NISHI PRN and Pt to self-monitor       Electronically signed by:  Frances Sawant RD  08/24/22 14:48 EDT  Pt seen from: [id number]

## 2022-10-18 DIAGNOSIS — E11.42 TYPE 2 DIABETES MELLITUS WITH DIABETIC POLYNEUROPATHY, WITHOUT LONG-TERM CURRENT USE OF INSULIN: Primary | ICD-10-CM

## 2022-10-18 DIAGNOSIS — K21.9 GASTROESOPHAGEAL REFLUX DISEASE WITHOUT ESOPHAGITIS: ICD-10-CM

## 2022-10-18 RX ORDER — DULOXETIN HYDROCHLORIDE 60 MG/1
60 CAPSULE, DELAYED RELEASE ORAL DAILY
Qty: 90 CAPSULE | Refills: 2 | Status: SHIPPED | OUTPATIENT
Start: 2022-10-18

## 2022-10-18 RX ORDER — OMEPRAZOLE 20 MG/1
20 CAPSULE, DELAYED RELEASE ORAL DAILY
Qty: 90 CAPSULE | Refills: 1 | Status: SHIPPED | OUTPATIENT
Start: 2022-10-18

## 2022-10-18 RX ORDER — METFORMIN HYDROCHLORIDE 500 MG/1
2000 TABLET, EXTENDED RELEASE ORAL
Qty: 360 TABLET | Refills: 1 | Status: SHIPPED | OUTPATIENT
Start: 2022-10-18

## 2022-10-18 NOTE — TELEPHONE ENCOUNTER
Caller: Vickie Bull YOLETTE    Relationship: Self    Best call back number: [phone]    Requested Prescriptions:   Requested Prescriptions     Pending Prescriptions Disp Refills   • DULoxetine (CYMBALTA) 60 MG capsule 90 capsule 2     Sig: Take 1 capsule by mouth Daily.   • omeprazole (priLOSEC) 20 MG capsule 90 capsule 1     Sig: Take 1 capsule by mouth Daily.   • metFORMIN ER (GLUCOPHAGE-XR) 500 MG 24 hr tablet 360 tablet 1     Sig: Take 4 tablets by mouth Daily With Dinner.        Pharmacy where request should be sent: [facility] [place] [facility]  [place], [place] - [address] - [phone] [facility] [phone] FX       Does the patient have less than a 3 day supply:  [] Yes  [x] No    Leah Esparza Rep   10/18/22 11:30 EDT

## 2022-10-18 NOTE — TELEPHONE ENCOUNTER
Patient made aware that these scripts were sent to [facility] pharmacy.

## 2022-10-20 ENCOUNTER — TELEPHONE (OUTPATIENT)
Dept: FAMILY MEDICINE CLINIC | Facility: CLINIC | Age: 67
End: 2022-10-20

## 2022-10-20 DIAGNOSIS — Z12.31 SCREENING MAMMOGRAM FOR BREAST CANCER: Primary | ICD-10-CM

## 2022-12-02 ENCOUNTER — TELEPHONE (OUTPATIENT)
Dept: FAMILY MEDICINE CLINIC | Facility: CLINIC | Age: 67
End: 2022-12-02

## 2022-12-02 DIAGNOSIS — M10.9 GOUT, UNSPECIFIED CAUSE, UNSPECIFIED CHRONICITY, UNSPECIFIED SITE: Primary | ICD-10-CM

## 2022-12-02 RX ORDER — LEVOTHYROXINE SODIUM 0.05 MG/1
50 TABLET ORAL DAILY
Qty: 90 TABLET | Refills: 3 | Status: SHIPPED | OUTPATIENT
Start: 2022-12-02

## 2022-12-02 NOTE — TELEPHONE ENCOUNTER
Caller: Vickie Bull    Relationship: Self    Best call back number: [phone]    Requested Prescriptions:   Requested Prescriptions     Pending Prescriptions Disp Refills   • levothyroxine (Synthroid) 50 MCG tablet 90 tablet 3     Sig: Take 1 tablet by mouth Daily.      Pharmacy where request should be sent:    [facility]  [address] [place], [place] [address] [phone]    Does the patient have less than a 3 day supply:  [] Yes  [x] No    Would you like a call back once the refill request has been completed: [] Yes [x] No      Leah Shannon Rep   12/02/22 11:46 EST

## 2022-12-02 NOTE — TELEPHONE ENCOUNTER
Caller: Vickie Bull    Relationship: Self    Best call back number: [phone]    Who are you requesting to speak with (clinical staff, provider,  specific staff member): MEDICAL STAFF    What was the call regarding: PATIENT CALLED TO SEE IF SHE HAD AN ACTIVE LAB ORDER IN THE SYSTEM AND IF URIC ACID CAN BE ADDED TO THE LAB ORDER. SHE HAS GOUT IN HER FOOT. PLEASE CALL PATIENT TO ADVISE.

## 2022-12-14 ENCOUNTER — LAB (OUTPATIENT)
Dept: LAB | Facility: HOSPITAL | Age: 67
End: 2022-12-14

## 2022-12-14 DIAGNOSIS — E11.42 TYPE 2 DIABETES MELLITUS WITH DIABETIC POLYNEUROPATHY, WITHOUT LONG-TERM CURRENT USE OF INSULIN: ICD-10-CM

## 2022-12-14 DIAGNOSIS — E03.9 ACQUIRED HYPOTHYROIDISM: ICD-10-CM

## 2022-12-14 DIAGNOSIS — E78.00 HIGH CHOLESTEROL: ICD-10-CM

## 2022-12-14 LAB
ALBUMIN SERPL-MCNC: 4.3 G/DL (ref 3.5–5.2)
ALBUMIN UR-MCNC: <1.2 MG/DL
ALBUMIN/GLOB SERPL: 1.5 G/DL
ALP SERPL-CCNC: 113 U/L (ref 39–117)
ALT SERPL W P-5'-P-CCNC: 26 U/L (ref 1–33)
ANION GAP SERPL CALCULATED.3IONS-SCNC: 11.9 MMOL/L (ref 5–15)
AST SERPL-CCNC: 22 U/L (ref 1–32)
BASOPHILS # BLD AUTO: 0.06 10*3/MM3 (ref 0–0.2)
BASOPHILS NFR BLD AUTO: 0.9 % (ref 0–1.5)
BILIRUB SERPL-MCNC: 0.2 MG/DL (ref 0–1.2)
BILIRUB UR QL STRIP: NEGATIVE
BUN SERPL-MCNC: 17 MG/DL (ref 8–23)
BUN/CREAT SERPL: 15.6 (ref 7–25)
CALCIUM SPEC-SCNC: 9.9 MG/DL (ref 8.6–10.5)
CHLORIDE SERPL-SCNC: 104 MMOL/L (ref 98–107)
CHOLEST SERPL-MCNC: 261 MG/DL (ref 0–200)
CLARITY UR: CLEAR
CO2 SERPL-SCNC: 28.1 MMOL/L (ref 22–29)
COLOR UR: YELLOW
CREAT SERPL-MCNC: 1.09 MG/DL (ref 0.57–1)
CREAT UR-MCNC: 195.2 MG/DL
DEPRECATED RDW RBC AUTO: 39.2 FL (ref 37–54)
EGFRCR SERPLBLD CKD-EPI 2021: 55.8 ML/MIN/1.73
EOSINOPHIL # BLD AUTO: 0.25 10*3/MM3 (ref 0–0.4)
EOSINOPHIL NFR BLD AUTO: 3.8 % (ref 0.3–6.2)
ERYTHROCYTE [DISTWIDTH] IN BLOOD BY AUTOMATED COUNT: 13.9 % (ref 12.3–15.4)
GLOBULIN UR ELPH-MCNC: 2.8 GM/DL
GLUCOSE SERPL-MCNC: 144 MG/DL (ref 65–99)
GLUCOSE UR STRIP-MCNC: NEGATIVE MG/DL
HBA1C MFR BLD: 7 % (ref 4.8–5.6)
HCT VFR BLD AUTO: 40.6 % (ref 34–46.6)
HDLC SERPL-MCNC: 49 MG/DL (ref 40–60)
HGB BLD-MCNC: 12.6 G/DL (ref 12–15.9)
HGB UR QL STRIP.AUTO: NEGATIVE
IMM GRANULOCYTES # BLD AUTO: 0.05 10*3/MM3 (ref 0–0.05)
IMM GRANULOCYTES NFR BLD AUTO: 0.8 % (ref 0–0.5)
KETONES UR QL STRIP: ABNORMAL
LDLC SERPL CALC-MCNC: 174 MG/DL (ref 0–100)
LDLC/HDLC SERPL: 3.5 {RATIO}
LEUKOCYTE ESTERASE UR QL STRIP.AUTO: NEGATIVE
LYMPHOCYTES # BLD AUTO: 2.06 10*3/MM3 (ref 0.7–3.1)
LYMPHOCYTES NFR BLD AUTO: 31.4 % (ref 19.6–45.3)
MCH RBC QN AUTO: 24.4 PG (ref 26.6–33)
MCHC RBC AUTO-ENTMCNC: 31 G/DL (ref 31.5–35.7)
MCV RBC AUTO: 78.7 FL (ref 79–97)
MICROALBUMIN/CREAT UR: NORMAL MG/G{CREAT}
MONOCYTES # BLD AUTO: 0.74 10*3/MM3 (ref 0.1–0.9)
MONOCYTES NFR BLD AUTO: 11.3 % (ref 5–12)
NEUTROPHILS NFR BLD AUTO: 3.41 10*3/MM3 (ref 1.7–7)
NEUTROPHILS NFR BLD AUTO: 51.8 % (ref 42.7–76)
NITRITE UR QL STRIP: NEGATIVE
NRBC BLD AUTO-RTO: 0 /100 WBC (ref 0–0.2)
PH UR STRIP.AUTO: 5.5 [PH] (ref 5–8)
PLATELET # BLD AUTO: 332 10*3/MM3 (ref 140–450)
PMV BLD AUTO: 10.7 FL (ref 6–12)
POTASSIUM SERPL-SCNC: 5.6 MMOL/L (ref 3.5–5.2)
PROT SERPL-MCNC: 7.1 G/DL (ref 6–8.5)
PROT UR QL STRIP: ABNORMAL
RBC # BLD AUTO: 5.16 10*6/MM3 (ref 3.77–5.28)
SODIUM SERPL-SCNC: 144 MMOL/L (ref 136–145)
SP GR UR STRIP: 1.02 (ref 1–1.03)
TRIGL SERPL-MCNC: 203 MG/DL (ref 0–150)
TSH SERPL DL<=0.05 MIU/L-ACNC: 1.9 UIU/ML (ref 0.27–4.2)
URATE SERPL-MCNC: 8.3 MG/DL (ref 2.4–5.7)
UROBILINOGEN UR QL STRIP: ABNORMAL
VLDLC SERPL-MCNC: 38 MG/DL (ref 5–40)
WBC NRBC COR # BLD: 6.57 10*3/MM3 (ref 3.4–10.8)

## 2022-12-14 PROCEDURE — 85025 COMPLETE CBC W/AUTO DIFF WBC: CPT

## 2022-12-14 PROCEDURE — 83036 HEMOGLOBIN GLYCOSYLATED A1C: CPT

## 2022-12-14 PROCEDURE — 81003 URINALYSIS AUTO W/O SCOPE: CPT

## 2022-12-14 PROCEDURE — 84550 ASSAY OF BLOOD/URIC ACID: CPT | Performed by: NURSE PRACTITIONER

## 2022-12-14 PROCEDURE — 82570 ASSAY OF URINE CREATININE: CPT

## 2022-12-14 PROCEDURE — 80053 COMPREHEN METABOLIC PANEL: CPT

## 2022-12-14 PROCEDURE — 84443 ASSAY THYROID STIM HORMONE: CPT

## 2022-12-14 PROCEDURE — 82043 UR ALBUMIN QUANTITATIVE: CPT

## 2022-12-14 PROCEDURE — 80061 LIPID PANEL: CPT

## 2022-12-15 ENCOUNTER — TELEPHONE (OUTPATIENT)
Dept: FAMILY MEDICINE CLINIC | Facility: CLINIC | Age: 67
End: 2022-12-15

## 2022-12-15 DIAGNOSIS — M10.9 GOUT, UNSPECIFIED CAUSE, UNSPECIFIED CHRONICITY, UNSPECIFIED SITE: Primary | ICD-10-CM

## 2022-12-15 RX ORDER — ALLOPURINOL 100 MG/1
100 TABLET ORAL DAILY
Qty: 90 TABLET | Refills: 1 | Status: SHIPPED | OUTPATIENT
Start: 2022-12-15

## 2022-12-15 NOTE — TELEPHONE ENCOUNTER
----- Message from HANS Mancera sent at 12/15/2022  8:55 AM EST -----  Uric acid level high start allopurinol 100 mg daily recheck uric acid level in 1 month we will send prescription to pharmacy place order for uric acid level

## 2022-12-16 ENCOUNTER — OFFICE VISIT (OUTPATIENT)
Dept: FAMILY MEDICINE CLINIC | Facility: CLINIC | Age: 67
End: 2022-12-16

## 2022-12-16 VITALS
DIASTOLIC BLOOD PRESSURE: 60 MMHG | HEART RATE: 92 BPM | WEIGHT: 207 LBS | SYSTOLIC BLOOD PRESSURE: 122 MMHG | OXYGEN SATURATION: 96 % | TEMPERATURE: 97.5 F | BODY MASS INDEX: 35.34 KG/M2 | HEIGHT: 64 IN

## 2022-12-16 DIAGNOSIS — Z78.0 POST-MENOPAUSAL: ICD-10-CM

## 2022-12-16 DIAGNOSIS — E11.69 TYPE 2 DIABETES MELLITUS WITH OTHER SPECIFIED COMPLICATION, WITH LONG-TERM CURRENT USE OF INSULIN: ICD-10-CM

## 2022-12-16 DIAGNOSIS — D64.9 ANEMIA, UNSPECIFIED TYPE: ICD-10-CM

## 2022-12-16 DIAGNOSIS — Z12.31 SCREENING MAMMOGRAM FOR BREAST CANCER: ICD-10-CM

## 2022-12-16 DIAGNOSIS — N28.9 RENAL INSUFFICIENCY: ICD-10-CM

## 2022-12-16 DIAGNOSIS — E78.00 HIGH CHOLESTEROL: ICD-10-CM

## 2022-12-16 DIAGNOSIS — Z79.4 TYPE 2 DIABETES MELLITUS WITH OTHER SPECIFIED COMPLICATION, WITH LONG-TERM CURRENT USE OF INSULIN: ICD-10-CM

## 2022-12-16 DIAGNOSIS — K21.9 GASTROESOPHAGEAL REFLUX DISEASE WITHOUT ESOPHAGITIS: ICD-10-CM

## 2022-12-16 DIAGNOSIS — E87.5 HYPERKALEMIA: ICD-10-CM

## 2022-12-16 DIAGNOSIS — Z79.899 MEDICATION MANAGEMENT: Primary | ICD-10-CM

## 2022-12-16 DIAGNOSIS — E03.9 ACQUIRED HYPOTHYROIDISM: ICD-10-CM

## 2022-12-16 DIAGNOSIS — M10.9 GOUT, UNSPECIFIED CAUSE, UNSPECIFIED CHRONICITY, UNSPECIFIED SITE: ICD-10-CM

## 2022-12-16 DIAGNOSIS — G89.29 CHRONIC MIDLINE THORACIC BACK PAIN: ICD-10-CM

## 2022-12-16 DIAGNOSIS — G62.9 NEUROPATHY: ICD-10-CM

## 2022-12-16 DIAGNOSIS — M54.6 CHRONIC MIDLINE THORACIC BACK PAIN: ICD-10-CM

## 2022-12-16 DIAGNOSIS — I10 PRIMARY HYPERTENSION: ICD-10-CM

## 2022-12-16 DIAGNOSIS — R00.0 TACHYCARDIA: ICD-10-CM

## 2022-12-16 PROBLEM — M51.369 DEGENERATION OF LUMBAR INTERVERTEBRAL DISC: Status: ACTIVE | Noted: 2022-03-29

## 2022-12-16 PROBLEM — E11.9 TYPE 2 DIABETES MELLITUS, WITH LONG-TERM CURRENT USE OF INSULIN: Status: ACTIVE | Noted: 2021-09-16

## 2022-12-16 PROBLEM — M54.14 THORACIC RADICULOPATHY: Status: ACTIVE | Noted: 2022-03-29

## 2022-12-16 PROBLEM — M47.816 LUMBAR SPONDYLOSIS: Status: ACTIVE | Noted: 2022-03-29

## 2022-12-16 PROBLEM — M51.36 DEGENERATION OF LUMBAR INTERVERTEBRAL DISC: Status: ACTIVE | Noted: 2022-03-29

## 2022-12-16 PROCEDURE — 80305 DRUG TEST PRSMV DIR OPT OBS: CPT | Performed by: NURSE PRACTITIONER

## 2022-12-16 PROCEDURE — 99214 OFFICE O/P EST MOD 30 MIN: CPT | Performed by: NURSE PRACTITIONER

## 2022-12-16 RX ORDER — GABAPENTIN 100 MG/1
100 CAPSULE ORAL 3 TIMES DAILY
Qty: 270 CAPSULE | Refills: 1 | Status: SHIPPED | OUTPATIENT
Start: 2022-12-16

## 2022-12-16 RX ORDER — KETOCONAZOLE 20 MG/ML
SHAMPOO TOPICAL
COMMUNITY
Start: 2022-11-12

## 2022-12-16 RX ORDER — VIT C/B6/B5/MAGNESIUM/HERB 173 50-5-6-5MG
CAPSULE ORAL
COMMUNITY

## 2022-12-16 RX ORDER — ATORVASTATIN CALCIUM 20 MG/1
20 TABLET, FILM COATED ORAL
Qty: 90 TABLET | Refills: 1 | Status: SHIPPED | OUTPATIENT
Start: 2022-12-16 | End: 2023-03-08 | Stop reason: SDUPTHER

## 2022-12-16 RX ORDER — LISINOPRIL 5 MG/1
TABLET ORAL
COMMUNITY
Start: 2022-11-12 | End: 2022-12-20

## 2022-12-16 NOTE — PROGRESS NOTES
Follow Up Office Visit      Patient Name: Vickie Bull  : 1955   MRN: [id number]     Chief Complaint:    Chief Complaint   Patient presents with   • Hypertension   • Hyperlipidemia   • Hypothyroidism   • Heartburn   • Anemia   • Diabetes       History of Present Illness: Vickie Bull is a 66 y.o. female who is here today to follow up for DM2, HTN, hyperlipidemia, hypothyroidism, anemia, GERD  Review lab results        Eye exam- Dr Fields  Foot exam-  Dr Balderrama  mammogram-2021  dexa-2020  Pap- 2018 Dr Miller  Colonoscopy-2020    C/o Patient picked up her Allopurinol yesterday, but [facility] didn't have the Chlorthalidone in stock so she hasn't gotten it yet.     Pt reports she stopped the lipitor and tried OTC     She has been having numbness, tingling and pain in her feet and chronic low back pain, pt reports MATTHEW did not help with low back pain and insurance would not approve ablation     Subjective      Review of Systems:   Review of Systems   Constitutional: Negative for fever.   HENT: Negative for ear pain and sore throat.    Eyes: Negative for visual disturbance.   Respiratory: Negative for cough and shortness of breath.    Cardiovascular: Negative for chest pain.   Gastrointestinal: Negative for diarrhea, nausea and vomiting.   Genitourinary: Negative for dysuria.   Musculoskeletal: Negative for myalgias.   Neurological: Negative for headaches.   Psychiatric/Behavioral: Negative for confusion. The patient is not nervous/anxious.         Past Medical History:   Past Medical History:   Diagnosis Date   • Acid reflux    • Anemia, unspecified    • Chronic allergic rhinitis    • Condition not found     Ulcer   • Corns and callus    • Diabetes (HCC)     Unspecified   • Diarrhea 04/15/2014   • GERD (gastroesophageal reflux disease)    • HBP (high blood pressure)    • High cholesterol    • HTN (hypertension)    • Hyperlipemia    • Hypothyroidism    • Ingrown toenail    • Leg pain     • Lumbar disc herniation 11/22/2013    L3-4 very small fat lat disc   • Lumbar pain    • Numbness in feet    • Renal insufficiency 03/16/2021   • Sciatica 11/22/2013    no sig structural compressive lesion   • Seasonal allergies    • Stomach ulcer    • Thyroid disorder        Past Surgical History:   Past Surgical History:   Procedure Laterality Date   • COLONOSCOPY     • ENDOSCOPY     • JOINT REPLACEMENT     • SHOULDER ACROMIOCLAVICULAR JOINT REPAIR     • SHOULDER SURGERY Right 10/2010   • TUBAL ABDOMINAL LIGATION         Family History:   Family History   Problem Relation Age of Onset   • Cancer Mother         unspecified   • Lung cancer Mother    • Diabetes Father         unspecified type   • Prostate cancer Father    • Diabetes Paternal Grandmother         unspecified type   • Heart attack Other    • Diabetes Other    • Diabetes Paternal Aunt         unspecified type   • Diabetes Paternal Uncle         unspecified type       Social History:   Social History     Socioeconomic History   • Marital status:    Tobacco Use   • Smoking status: Never   • Smokeless tobacco: Never   • Tobacco comments:     second hand smoke exposure-never   Vaping Use   • Vaping Use: Never used   Substance and Sexual Activity   • Alcohol use: Yes     Comment: rarely; wine and liquor   • Drug use: Never   • Sexual activity: Defer       Medications:     Current Outpatient Medications:   •  allopurinol (Zyloprim) 100 MG tablet, Take 1 tablet by mouth Daily., Disp: 90 tablet, Rfl: 1  •  Alpha-Lipoic Acid 600 MG tablet, Take  by mouth., Disp: , Rfl:   •  ammonium lactate (AmLactin) 12 % lotion, Apply  topically to the appropriate area as directed 2 (Two) Times a Day., Disp: 225 g, Rfl: 11  •  Barberry-Oreg Grape-Goldenseal (BERBERINE COMPLEX PO), Take  by mouth., Disp: , Rfl:   •  cetirizine (zyrTEC) 10 MG tablet, cetirizine 10 mg oral tablet take 1 tablet (10 mg) by oral route once daily   Suspended, Disp: , Rfl:   •  chlorthalidone  "(HYGROTEN) 15 MG tablet, Take 1 tablet by mouth Daily., Disp: 30 tablet, Rfl: 0  •  Cholecalciferol 50 MCG (2000 UT) capsule, Vitamin D3 2,000 unit oral capsule take 1 capsule by oral route daily   Suspended, Disp: , Rfl:   •  DULoxetine (CYMBALTA) 60 MG capsule, Take 1 capsule by mouth Daily., Disp: 90 capsule, Rfl: 2  •  glucose blood (FREESTYLE LITE) test strip, Use one test strip daily to check blood sugar levels., Disp: 100 each, Rfl: 2  •  ketoconazole (NIZORAL) 2 % shampoo, , Disp: , Rfl:   •  levothyroxine (Synthroid) 50 MCG tablet, Take 1 tablet by mouth Daily., Disp: 90 tablet, Rfl: 3  •  lisinopril (PRINIVIL,ZESTRIL) 10 MG tablet, Take 10 mg by mouth Daily., Disp: , Rfl:   •  metFORMIN ER (GLUCOPHAGE-XR) 500 MG 24 hr tablet, Take 4 tablets by mouth Daily With Dinner., Disp: 360 tablet, Rfl: 1  •  multivitamin with minerals tablet tablet, 1 tablet Daily., Disp: , Rfl:   •  omeprazole (priLOSEC) 20 MG capsule, Take 1 capsule by mouth Daily., Disp: 90 capsule, Rfl: 1  •  Turmeric 500 MG capsule, Take  by mouth., Disp: , Rfl:   •  atorvastatin (LIPITOR) 20 MG tablet, Take 1 tablet by mouth every night at bedtime., Disp: 90 tablet, Rfl: 1  •  gabapentin (NEURONTIN) 100 MG capsule, Take 1 capsule by mouth 3 (Three) Times a Day., Disp: 270 capsule, Rfl: 1    Allergies:   Allergies   Allergen Reactions   • Cephalexin Hives   • Latex Itching   • Penicillins Hives and Rash           Objective     Physical Exam:  Vital Signs:   Vitals:    12/16/22 1332   BP: 122/60   Pulse: 92   Temp: 97.5 °F (36.4 °C)   SpO2: 96%   Weight: 93.9 kg (207 lb)   Height: 162.6 cm (64\")     Body mass index is 35.53 kg/m².     Physical Exam  HENT:      Right Ear: Tympanic membrane normal.      Left Ear: Tympanic membrane normal.      Nose: Nose normal.      Mouth/Throat:      Mouth: Mucous membranes are moist.   Eyes:      Conjunctiva/sclera: Conjunctivae normal.   Neck:      Vascular: No carotid bruit.   Cardiovascular:      Rate and " Rhythm: Regular rhythm.      Heart sounds: Normal heart sounds. No murmur heard.  Pulmonary:      Effort: Pulmonary effort is normal.      Breath sounds: Normal breath sounds.   Abdominal:      General: Bowel sounds are normal.      Palpations: Abdomen is soft.   Musculoskeletal:      Right lower leg: No edema.      Left lower leg: No edema.   Skin:     General: Skin is warm and dry.   Neurological:      Mental Status: She is alert.   Psychiatric:         Mood and Affect: Mood normal.         Behavior: Behavior normal.             Assessment / Plan      Assessment/Plan:   Diagnoses and all orders for this visit:    1. Medication management (Primary)  -     POC Urine Drug Screen [facility]    2. Type 2 diabetes mellitus with other specified complication, with long-term current use of insulin (HCC)  -     Comprehensive Metabolic Panel; Future  -     Microalbumin / Creatinine Urine Ratio - Urine, Clean Catch; Future  -     Lipid Panel; Future  -     Urinalysis With Culture If Indicated -; Future    3. High cholesterol  -     Comprehensive Metabolic Panel; Future  -     Lipid Panel; Future  -     Comprehensive Metabolic Panel; Future  -     Lipid Panel; Future    4. Primary hypertension    5. Acquired hypothyroidism  -     TSH; Future    6. Tachycardia    7. Gout, unspecified cause, unspecified chronicity, unspecified site  -     Uric Acid; Future    8. Gastroesophageal reflux disease without esophagitis    9. Renal insufficiency    10. Hyperkalemia  -     Comprehensive metabolic panel; Future    11. Anemia, unspecified type  -     CBC Auto Differential; Future    12. Chronic midline thoracic back pain    13. Post-menopausal  -     DEXA Bone Density Axial; Future    14. Neuropathy  -     gabapentin (NEURONTIN) 100 MG capsule; Take 1 capsule by mouth 3 (Three) Times a Day.  Dispense: 270 capsule; Refill: 1    15. Screening mammogram for breast cancer  -     Mammo Screening Digital Tomosynthesis Bilateral With CAD;  "Future    Other orders  -     chlorthalidone (HYGROTEN) 15 MG tablet; Take 1 tablet by mouth Daily.  Dispense: 30 tablet; Refill: 0  -     atorvastatin (LIPITOR) 20 MG tablet; Take 1 tablet by mouth every night at bedtime.  Dispense: 90 tablet; Refill: 1       Diabetes mellitus type 2 currently controlled hemoglobin A1c 7 recommend decrease caloric intake exercise 30 minutes daily weight loss  Hyperlipidemia LDL above goal patient stopped her statin using OTC medication which is not working we will resume Lipitor 20 mg at nighttime denies myalgias on this medication we will recheck CMP and lipid panel to 30 days to monitor  Hypertension currently controlled at this time with lisinopril  Renal insufficiency recommend increase water intake  Hypothyroidism TSH within goal range on current dose of Synthroid 50 mcg daily denies palpitations  Hyperkalemia we will start chlorthalidone recheck CMP on Monday, December 19  Postmenopausal we will obtain bone density to monitor for osteoporosis  Chronic thoracic back pain currently taking Cymbalta 60 mg daily  Neuropathy we will provide a refill of gabapentin Ruddy reviewed urine drug screen today  We will provide order for screening mammogram denies lumps mass tenderness blood or discharge from the nipple      Follow Up:   Return in about 6 months (around 6/16/2023).    Erwin Ascencio, HANS    \"Please note that portions of this note were completed with a voice recognition program.\"    "

## 2022-12-20 RX ORDER — FLUTICASONE PROPIONATE 50 MCG
2 SPRAY, SUSPENSION (ML) NASAL DAILY
COMMUNITY
End: 2022-12-20 | Stop reason: SDUPTHER

## 2022-12-20 RX ORDER — FLUTICASONE PROPIONATE 50 MCG
2 SPRAY, SUSPENSION (ML) NASAL DAILY
Qty: 16 G | Refills: 3 | Status: SHIPPED | OUTPATIENT
Start: 2022-12-20

## 2022-12-21 ENCOUNTER — CLINICAL SUPPORT (OUTPATIENT)
Dept: FAMILY MEDICINE CLINIC | Facility: CLINIC | Age: 67
End: 2022-12-21

## 2022-12-21 DIAGNOSIS — E87.5 HYPERKALEMIA: ICD-10-CM

## 2022-12-21 LAB
ALBUMIN SERPL-MCNC: 4.2 G/DL (ref 3.5–5.2)
ALBUMIN/GLOB SERPL: 1.5 G/DL
ALP SERPL-CCNC: 116 U/L (ref 39–117)
ALT SERPL W P-5'-P-CCNC: 27 U/L (ref 1–33)
ANION GAP SERPL CALCULATED.3IONS-SCNC: 7.5 MMOL/L (ref 5–15)
AST SERPL-CCNC: 21 U/L (ref 1–32)
BILIRUB SERPL-MCNC: 0.3 MG/DL (ref 0–1.2)
BUN SERPL-MCNC: 21 MG/DL (ref 8–23)
BUN/CREAT SERPL: 20.4 (ref 7–25)
CALCIUM SPEC-SCNC: 10 MG/DL (ref 8.6–10.5)
CHLORIDE SERPL-SCNC: 101 MMOL/L (ref 98–107)
CO2 SERPL-SCNC: 31.5 MMOL/L (ref 22–29)
CREAT SERPL-MCNC: 1.03 MG/DL (ref 0.57–1)
EGFRCR SERPLBLD CKD-EPI 2021: 59.7 ML/MIN/1.73
GLOBULIN UR ELPH-MCNC: 2.8 GM/DL
GLUCOSE SERPL-MCNC: 157 MG/DL (ref 65–99)
POTASSIUM SERPL-SCNC: 5.1 MMOL/L (ref 3.5–5.2)
PROT SERPL-MCNC: 7 G/DL (ref 6–8.5)
SODIUM SERPL-SCNC: 140 MMOL/L (ref 136–145)

## 2022-12-21 PROCEDURE — 36415 COLL VENOUS BLD VENIPUNCTURE: CPT | Performed by: NURSE PRACTITIONER

## 2022-12-21 PROCEDURE — 80053 COMPREHEN METABOLIC PANEL: CPT | Performed by: NURSE PRACTITIONER

## 2022-12-29 ENCOUNTER — APPOINTMENT (OUTPATIENT)
Dept: BONE DENSITY | Facility: HOSPITAL | Age: 67
End: 2022-12-29

## 2023-01-11 ENCOUNTER — HOSPITAL ENCOUNTER (OUTPATIENT)
Dept: BONE DENSITY | Facility: HOSPITAL | Age: 68
Discharge: HOME | End: 2023-01-11
Admitting: NURSE PRACTITIONER
Payer: MEDICARE

## 2023-01-11 DIAGNOSIS — Z78.0 POST-MENOPAUSAL: ICD-10-CM

## 2023-01-11 PROCEDURE — 77080 DXA BONE DENSITY AXIAL: CPT

## 2023-01-12 ENCOUNTER — OFFICE VISIT (OUTPATIENT)
Dept: SLEEP MEDICINE | Facility: HOSPITAL | Age: 68
End: 2023-01-12
Payer: MEDICARE

## 2023-01-12 VITALS
DIASTOLIC BLOOD PRESSURE: 61 MMHG | WEIGHT: 202.2 LBS | BODY MASS INDEX: 34.52 KG/M2 | SYSTOLIC BLOOD PRESSURE: 142 MMHG | OXYGEN SATURATION: 96 % | HEIGHT: 64 IN | HEART RATE: 86 BPM

## 2023-01-12 DIAGNOSIS — G47.33 OSA (OBSTRUCTIVE SLEEP APNEA): ICD-10-CM

## 2023-01-12 PROCEDURE — G0463 HOSPITAL OUTPT CLINIC VISIT: HCPCS | Performed by: INTERNAL MEDICINE

## 2023-01-12 NOTE — PROGRESS NOTES
"      [facility]         Dr Akiko Ramirez  [unfilled]  Patient Care Team:  Nguyen Ascencio APRN as PCP - General (Nurse Practitioner)    Chief Complaint:initial sleep study showing AHI of 15 events per hour    Interval History: Patient was last seen 2019 at that time she tells me she stopped using her CPAP.  Lately she saw her cardiologist who recommended that she get back on her CPAP.  She tells that she is increasingly tired no energy.  She did receive a new CPAP machine from [email or web address] but has not used it yet.  Previously she tells me she had issues with dry mouth consistently.  Currently has a full facemask.  Goes to bed 2 AM gets up 10 AM gets about 8+ hours of sleep and tired without CPAP.  No tobacco abuse no alcohol abuse no caffeine abuse.    REVIEW OF SYSTEMS:   CARDIOVASCULAR: No chest pain, chest pressure or chest discomfort. No palpitations or edema.   RESPIRATORY: No shortness of breath, cough or sputum.   GASTROINTESTINAL: No anorexia, nausea, vomiting or diarrhea. No abdominal pain or blood.   HEMATOLOGIC: No bleeding or bruising.  Markle 8 out of 24 the normal limits  Positive for dry mouth acid reflux and depression    Ventilator/Non-Invasive Ventilation Settings (From admission, onward)    None            Vital Signs  Heart Rate:  [86] 86  BP: (142)/(61) 142/61  [unfilled]  Flowsheet Rows    Flowsheet Row First Filed Value   Admission Height 162.6 cm (64\") Documented at 01/12/2023 1300   Admission Weight 91.7 kg (202 lb 3.2 oz) Documented at 01/12/2023 1300          Physical Exam:  Patient is examined using the personal protective equipment as per guidelines from infection control for this particular patient as enacted.  Hand hygiene was performed before and after patient interaction.   General Appearance:    Alert, cooperative, in no acute distress.  Following simple commands  ENT Mallampati between 3 and 4 no nasal congestion  Neck midline trachea, no thyromegaly   Lungs:     " Clear to auscultation, respirations regular, even and                  unlabored    Heart:    Regular rhythm and normal rate, normal S1 and S2, no            murmur, no gallop, no rub, no click   Chest Wall:    No abnormalities observed   Abdomen:     Normal bowel sounds, no masses, no organomegaly, soft        nontender, nondistended, no guarding, no rebound                tenderness   Extremities:   Moves all extremities well, no edema, no cyanosis, no             redness  CNS no focal neurological deficits normal sensory exam  Skin no rashes no nodules  Musculoskeletal no cyanosis no clubbing normal range of motion     Results Review:                                          I reviewed the patient's new clinical results.  I personally viewed and interpreted the patient's chest x-ray.        Medication Review:       No current facility-administered medications for this visit.      ASSESSMENT:   Obstructive sleep apnea with apnea-hypopnea index of 15 events per hour with    comorbidities of fibromyalgia, hypertension, diabetes mellitus, and    hypothyroidism.      PLAN:  Reeducated patient extensively on KELLY and CPAP usage  She just received a new CPAP machine from [facility].  We will go ahead and add a chinstrap and encourage patient to use humidification as per her needs.  Correlation between EKLLY and current comorbidities were discussed in detail  Cardiovascular comorbidities with untreated KELLY was also discussed in detail  Patient tells me that she wants to get back on the CPAP we will continue with CPAP 12 cm for now  Currently happy with the current mask  All questions answered  Sleep hygiene measures  I will see her back in 3 months to review compliance download      Akiko Ramirez MD  01/12/23  13:57 EST

## 2023-01-23 ENCOUNTER — HOSPITAL ENCOUNTER (OUTPATIENT)
Dept: MAMMOGRAPHY | Facility: HOSPITAL | Age: 68
Discharge: HOME | End: 2023-01-23
Admitting: NURSE PRACTITIONER
Payer: MEDICARE

## 2023-01-23 DIAGNOSIS — Z12.31 SCREENING MAMMOGRAM FOR BREAST CANCER: ICD-10-CM

## 2023-01-23 PROCEDURE — 77063 BREAST TOMOSYNTHESIS BI: CPT

## 2023-01-23 PROCEDURE — 77067 SCR MAMMO BI INCL CAD: CPT

## 2023-01-26 ENCOUNTER — LAB (OUTPATIENT)
Dept: LAB | Facility: HOSPITAL | Age: 68
End: 2023-01-26
Payer: MEDICARE

## 2023-01-26 DIAGNOSIS — E78.00 HIGH CHOLESTEROL: ICD-10-CM

## 2023-01-26 LAB
ALBUMIN SERPL-MCNC: 4.4 G/DL (ref 3.5–5.2)
ALBUMIN/GLOB SERPL: 1.6 G/DL
ALP SERPL-CCNC: 128 U/L (ref 39–117)
ALT SERPL W P-5'-P-CCNC: 27 U/L (ref 1–33)
ANION GAP SERPL CALCULATED.3IONS-SCNC: 8.8 MMOL/L (ref 5–15)
AST SERPL-CCNC: 23 U/L (ref 1–32)
BILIRUB SERPL-MCNC: 0.4 MG/DL (ref 0–1.2)
BUN SERPL-MCNC: 22 MG/DL (ref 8–23)
BUN/CREAT SERPL: 21.2 (ref 7–25)
CALCIUM SPEC-SCNC: 9.9 MG/DL (ref 8.6–10.5)
CHLORIDE SERPL-SCNC: 98 MMOL/L (ref 98–107)
CHOLEST SERPL-MCNC: 182 MG/DL (ref 0–200)
CO2 SERPL-SCNC: 31.2 MMOL/L (ref 22–29)
CREAT SERPL-MCNC: 1.04 MG/DL (ref 0.57–1)
EGFRCR SERPLBLD CKD-EPI 2021: 58.7 ML/MIN/1.73
GLOBULIN UR ELPH-MCNC: 2.7 GM/DL
GLUCOSE SERPL-MCNC: 124 MG/DL (ref 65–99)
HDLC SERPL-MCNC: 47 MG/DL (ref 40–60)
LDLC SERPL CALC-MCNC: 98 MG/DL (ref 0–100)
LDLC/HDLC SERPL: 1.95 {RATIO}
POTASSIUM SERPL-SCNC: 4.7 MMOL/L (ref 3.5–5.2)
PROT SERPL-MCNC: 7.1 G/DL (ref 6–8.5)
SODIUM SERPL-SCNC: 138 MMOL/L (ref 136–145)
TRIGL SERPL-MCNC: 217 MG/DL (ref 0–150)
VLDLC SERPL-MCNC: 37 MG/DL (ref 5–40)

## 2023-01-26 PROCEDURE — 80061 LIPID PANEL: CPT

## 2023-01-26 PROCEDURE — 80053 COMPREHEN METABOLIC PANEL: CPT

## 2023-02-02 RX ORDER — LISINOPRIL 10 MG/1
10 TABLET ORAL DAILY
Qty: 90 TABLET | Refills: 1 | Status: SHIPPED | OUTPATIENT
Start: 2023-02-02 | End: 2023-02-14 | Stop reason: SDUPTHER

## 2023-02-02 NOTE — TELEPHONE ENCOUNTER
Caller: RutlandVickie    Relationship: Self    Best call back number: [phone]     Requested Prescriptions:   Requested Prescriptions     Pending Prescriptions Disp Refills   • lisinopril (PRINIVIL,ZESTRIL) 10 MG tablet       Sig: Take 1 tablet by mouth Daily.        Pharmacy where request should be sent: [facility], [place]  [address] [phone] [facility] [phone] FX     Additional details provided by patient:     Does the patient have less than a 3 day supply:  [x] Yes  [] No    Would you like a call back once the refill request has been completed: [x] Yes [] No    If the office needs to give you a call back, can they leave a voicemail: [x] Yes [] No    Leah Adair Rep   02/02/23 13:59 EST

## 2023-02-14 RX ORDER — LISINOPRIL 10 MG/1
10 TABLET ORAL DAILY
Qty: 90 TABLET | Refills: 1 | Status: SHIPPED | OUTPATIENT
Start: 2023-02-14

## 2023-02-14 NOTE — TELEPHONE ENCOUNTER
Caller: Vickie Bull    Relationship: Self    Best call back number: [phone]    Requested Prescriptions:   Requested Prescriptions     Pending Prescriptions Disp Refills   • lisinopril (PRINIVIL,ZESTRIL) 10 MG tablet 90 tablet 1     Sig: Take 1 tablet by mouth Daily.        Pharmacy where request should be sent: [facility] [place], [place]  [address] [facility] [address] [phone] [facility] [phone] FX     Additional details provided by patient: PATIENT IS NEEDING A REFILL.     Does the patient have less than a 3 day supply:  [x] Yes  [] No    Would you like a call back once the refill request has been completed: [x] Yes [] No    If the office needs to give you a call back, can they leave a voicemail: [x] Yes [] No    Leah Wesley Rep   02/14/23 13:21 EST

## 2023-02-20 PROCEDURE — 87205 SMEAR GRAM STAIN: CPT | Performed by: PHYSICIAN ASSISTANT

## 2023-02-20 PROCEDURE — 87070 CULTURE OTHR SPECIMN AEROBIC: CPT | Performed by: PHYSICIAN ASSISTANT

## 2023-02-23 ENCOUNTER — TELEPHONE (OUTPATIENT)
Dept: FAMILY MEDICINE CLINIC | Facility: CLINIC | Age: 68
End: 2023-02-23

## 2023-02-23 NOTE — TELEPHONE ENCOUNTER
Caller: Vickie Bull    Relationship to patient: Self    Best call back number: [phone]    Chief complaint: INFECTION IN TOE, FOLLOW UP FROM URGENT CARE    Type of visit: OFFICE VISIT    Requested date: ASAP    Additional notes: PATIENT WOULD LIKE A CALL BACK TO DISCUSS SCHEDULING A FOLLOW UP FROM URGENT CARE AS SOON AS POSSIBLE.

## 2023-02-24 ENCOUNTER — OFFICE VISIT (OUTPATIENT)
Dept: PODIATRY | Facility: CLINIC | Age: 68
End: 2023-02-24
Payer: MEDICARE

## 2023-02-24 VITALS
DIASTOLIC BLOOD PRESSURE: 52 MMHG | HEIGHT: 64 IN | BODY MASS INDEX: 34.49 KG/M2 | TEMPERATURE: 96.9 F | WEIGHT: 202 LBS | SYSTOLIC BLOOD PRESSURE: 136 MMHG | HEART RATE: 93 BPM | OXYGEN SATURATION: 100 %

## 2023-02-24 DIAGNOSIS — M10.9 ACUTE GOUT INVOLVING TOE OF RIGHT FOOT, UNSPECIFIED CAUSE: ICD-10-CM

## 2023-02-24 DIAGNOSIS — M79.671 FOOT PAIN, RIGHT: Primary | ICD-10-CM

## 2023-02-24 PROCEDURE — 99213 OFFICE O/P EST LOW 20 MIN: CPT | Performed by: PODIATRIST

## 2023-02-24 RX ORDER — METRONIDAZOLE 7.5 MG/G
GEL TOPICAL
COMMUNITY
Start: 2023-01-23

## 2023-02-24 NOTE — PROGRESS NOTES
[facility] - PODIATRY    Today's Date: 02/24/23    Patient Name: Vickie Bull  MRN: [id number]  CSN: [id number]  PCP: Nguyen Ascencio APRN, Last PCP Visit: 16 June 2022  Referring Provider: No ref. provider found    SUBJECTIVE     Chief Complaint   Patient presents with   • Right Foot - Pain     Swelling right 4th toe x 2 weeks  seen at urgent care 2/20/23 underwent I&D with culture given antibiotics   Patient states she was started on allopurinol in December for high uric acid     HPI: Vickie Bull, a 67 y.o.female, comes presents to clinic for a diabetic foot evaluation.    New, Established, New Problem: new problem    Location:  Right 4th toe    Duration:  Past 2 weeks    Onset: Insidious    Nature: Well controlled    Stable, worsening, improving: Stable    Aggravating factors:  Patient relates pain is aggravated by shoe gear and ambulation.      Patient controlling diabetes via: NIDDM    Patient denies any fevers, chills, nausea, vomiting, shortness of breathe, nor any other constitutional signs nor symptoms.    No other pedal complaints at this time.    Medical changes:  Newly diagnosed with gout    Past Medical History:   Diagnosis Date   • Acid reflux    • Anemia, unspecified    • Cancer ([facility]) August 2022    Skin cancer on face   • Chronic allergic rhinitis    • Condition not found     Ulcer   • Corns and callus    • Diabetes ([facility])     Unspecified   • Diarrhea 04/15/2014   • GERD (gastroesophageal reflux disease)    • Gout 02/13/2023    Possible gout, not sure   • HBP (high blood pressure)    • High cholesterol    • HTN (hypertension)    • Hyperlipemia    • Hypothyroidism    • Ingrown toenail    • Leg pain    • Lumbar disc herniation 11/22/2013    L3-4 very small fat lat disc   • Lumbar pain    • Neuropathy in diabetes ([facility]) 2021   • Numbness in feet    • Renal insufficiency 03/16/2021   • Sciatica 11/22/2013    no sig structural compressive lesion   • Seasonal allergies     • Stomach ulcer    • Thyroid disorder      Past Surgical History:   Procedure Laterality Date   • COLONOSCOPY     • ENDOSCOPY     • JOINT REPLACEMENT     • SHOULDER ACROMIOCLAVICULAR JOINT REPAIR     • SHOULDER SURGERY Right 10/2010   • TUBAL ABDOMINAL LIGATION       Family History   Problem Relation Age of Onset   • Cancer Mother         unspecified   • Lung cancer Mother    • Diabetes Father         unspecified type   • Prostate cancer Father    • Diabetes Paternal Grandmother         unspecified type   • Heart attack Other    • Diabetes Other    • Diabetes Paternal Aunt         unspecified type   • Diabetes Paternal Uncle         unspecified type     Social History     Socioeconomic History   • Marital status:    Tobacco Use   • Smoking status: Never     Passive exposure: Never   • Smokeless tobacco: Never   • Tobacco comments:     second hand smoke exposure-never   Vaping Use   • Vaping Use: Never used   Substance and Sexual Activity   • Alcohol use: Yes     Comment: Rarely   • Drug use: Never   • Sexual activity: Yes     Partners: Male     Allergies   Allergen Reactions   • Cephalexin Hives   • Latex Itching   • Penicillins Hives and Rash     Current Outpatient Medications   Medication Sig Dispense Refill   • allopurinol (Zyloprim) 100 MG tablet Take 1 tablet by mouth Daily. 90 tablet 1   • Alpha-Lipoic Acid 600 MG tablet Take  by mouth.     • ammonium lactate (AmLactin) 12 % lotion Apply  topically to the appropriate area as directed 2 (Two) Times a Day. 225 g 11   • atorvastatin (LIPITOR) 20 MG tablet Take 1 tablet by mouth every night at bedtime. 90 tablet 1   • Barberry-Oreg Grape-Goldenseal (BERBERINE COMPLEX PO) Take  by mouth.     • cetirizine (zyrTEC) 10 MG tablet cetirizine 10 mg oral tablet take 1 tablet (10 mg) by oral route once daily   Suspended     • chlorthalidone (HYGROTEN) 15 MG tablet Take 1 tablet by mouth Daily. 30 tablet 0   • Cholecalciferol 50 MCG (2000 UT) capsule Vitamin D3  2,000 unit oral capsule take 1 capsule by oral route daily   Suspended     • DULoxetine (CYMBALTA) 60 MG capsule Take 1 capsule by mouth Daily. 90 capsule 2   • fluticasone (FLONASE) 50 MCG/ACT nasal spray 2 sprays into the nostril(s) as directed by provider Daily. 16 g 3   • gabapentin (NEURONTIN) 100 MG capsule Take 1 capsule by mouth 3 (Three) Times a Day. 270 capsule 1   • glucose blood (FREESTYLE LITE) test strip Use one test strip daily to check blood sugar levels. 100 each 2   • ketoconazole (NIZORAL) 2 % shampoo      • levothyroxine (Synthroid) 50 MCG tablet Take 1 tablet by mouth Daily. 90 tablet 3   • lisinopril (PRINIVIL,ZESTRIL) 10 MG tablet Take 1 tablet by mouth Daily. 90 tablet 1   • metFORMIN ER (GLUCOPHAGE-XR) 500 MG 24 hr tablet Take 4 tablets by mouth Daily With Dinner. 360 tablet 1   • metroNIDAZOLE (METROGEL) 0.75 % gel As directed     • multivitamin with minerals tablet tablet 1 tablet Daily.     • omeprazole (priLOSEC) 20 MG capsule Take 1 capsule by mouth Daily. 90 capsule 1   • sulfamethoxazole-trimethoprim (Bactrim DS) 800-160 MG per tablet Take 1 tablet by mouth 2 (Two) Times a Day for 10 days. 20 tablet 0   • Turmeric 500 MG capsule Take  by mouth.     • fluticasone (FLONASE) 50 MCG/ACT nasal spray 2 sprays into the nostril(s) as directed by provider Daily for 14 days. 16 g 0     No current facility-administered medications for this visit.     Review of Systems   Constitutional: Negative.    Skin:        Inflamed Right 4th toe   Neurological: Positive for numbness.   All other systems reviewed and are negative.      OBJECTIVE     Vitals:    02/24/23 0953   BP: 136/52   Pulse: 93   Temp: 96.9 °F (36.1 °C)   SpO2: 100%       Body mass index is 34.67 kg/m².    Lab Results   Component Value Date    HGBA1C 7.00 (H) 12/14/2022       Lab Results   Component Value Date    GLUCOSE 124 (H) 01/26/2023    CALCIUM 9.9 01/26/2023     01/26/2023    K 4.7 01/26/2023    CO2 31.2 (H) 01/26/2023    CL  98 01/26/2023    BUN 22 01/26/2023    CREATININE 1.04 (H) 01/26/2023    EGFRIFNONA 36 (L) 09/10/2021    BCR 21.2 01/26/2023    ANIONGAP 8.8 01/26/2023       Patient seen in no apparent distress.      PHYSICAL EXAM:     Foot/Ankle Exam:       General:   Diabetic Foot Exam Performed    Appearance: obesity    Orientation: AAOx3    Affect: appropriate    Gait: unimpaired    Shoe Gear:  Casual shoes    VASCULAR      Right Foot Vascularity   Normal vascular exam    Dorsalis pedis:  2+  Posterior tibial:  2+  Skin Temperature: warm    Edema Grading:  None  CFT:  < 3 seconds  Pedal Hair Growth:  Present  Varicosities: none       Left Foot Vascularity   Normal vascular exam    Dorsalis pedis:  2+  Posterior tibial:  2+  Skin Temperature: warm    Edema Grading:  None  CFT:  < 3 seconds  Pedal Hair Growth:  Present  Varicosities: none        NEUROLOGIC     Right Foot Neurologic   Light touch sensation:  Diminished  Vibratory sensation:  Diminished  Hot/Cold sensation: diminished    Protective Sensation using [place]nette Monofilament:  5     Left Foot Neurologic   Light touch sensation:  Diminished  Vibratory sensation:  Diminished  Hot/cold sensation: diminished    Protective Sensation using [place]nette Monofilament:  5     MUSCULOSKELETAL      Right Foot Musculoskeletal   Tenderness: toe 4    Tenderness comment:  Local erythema  Arch:  Normal     Left Foot Musculoskeletal   Arch:  Normal     MUSCLE STRENGTH     Right Foot Muscle Strength   Foot dorsiflexion:  4  Foot plantar flexion:  4  Foot inversion:  4  Foot eversion:  4     Left Foot Muscle Strength   Foot dorsiflexion:  4  Foot plantar flexion:  4  Foot inversion:  4  Foot eversion:  4     DERMATOLOGIC     Right Foot Dermatologic   Skin: skin intact    Nails: normal       Left Foot Dermatologic   Skin: skin intact    Nails: normal        ASSESSMENT/PLAN     Diagnoses and all orders for this visit:    1. Foot pain, right (Primary)    2. Acute gout involving toe  of right foot, unspecified cause        Comprehensive lower extremity examination and evaluation was performed.    Discussed findings and treatment plan including risks, benefits, and treatment options with patient in detail. Patient agreed with treatment plan.    Patient is to monitor for recurrence and any new symptoms and to contact Dr. Ibanez's office for a follow-up appointment.      The patient states understanding and agreement with this plan.    Diabetic foot exam performed and documented this date, compliant with CQM required standards. Detail of findings as noted in physical exam.  Lower extremity Neurologic exam for diabetic patient performed and documented this date, compliant with PQRS required standards. Detail of findings as noted in physical exam.  Advised patient importance of good routine lower extremity hygiene. Advised patient importance of evaluating for intact skin and pain free nail borders.  Advised patient to use mirror to evaluate plantar/ soles of feet for better visualization. Advised patient monitor and phone office to be seen if any cracking to skin, open lesions, painful nail borders or if nails become elongated prior to next visit. Advised patient importance of daily cleansing of lower extremities, followed by good skin cream to maintain normal hydration of skin. Also advised patient importance of close daily monitoring of blood sugar. Advised to regulate diet and medications to maintain control of blood sugar in optimal range. Contact primary care provider if difficulties maintaining blood sugar levels.  Advised Patient of presence of Diabetes Mellitus condition.  Advised Patient risk of progression and worsening or improvement, then return of condition.  Will monitor condition for any change in future. Treat with most appropriate treatment pending status of condition.  Counseled and advised patient extensively on nature and ramifications of diabetes. Standard instructions given to  patient for good diabetic foot care and maintenance. Advised importance of careful monitoring to avoid break down and complications secondary to diabetes. Advised patient importance of strict maintenance of blood sugar control. Advised patient of possible ominous results from neglect of condition, i.e.: amputation/ loss of digits, feet and legs, or even death.  Patient states understands counseling, will monitor closely, continue good hygiene and routine diabetic foot care. Patient will contact office is questions or problems.      An After Visit Summary was printed and given to the patient at discharge, including (if requested) any available informative/educational handouts regarding diagnosis, treatment, or medications. All questions were answered to patient/family satisfaction. Should symptoms fail to improve or worsen they agree to call or return to clinic or to go to the Emergency Department. Discussed the importance of following up with any needed screening tests/labs/specialist appointments and any requested follow-up recommended by me today. Importance of maintaining follow-up discussed and patient accepts that missed appointments can delay diagnosis and potentially lead to worsening of conditions.    Return in about 4 months (around 6/22/2023) for Podiatry Diabetic Foot Exam, Already made., or sooner if acute issues arise.    This document has been electronically signed by Eric Ibanez DPM on February 24, 2023 11:37 EST

## 2023-03-08 RX ORDER — ATORVASTATIN CALCIUM 20 MG/1
20 TABLET, FILM COATED ORAL
Qty: 90 TABLET | Refills: 1 | Status: SHIPPED | OUTPATIENT
Start: 2023-03-08

## 2023-03-08 NOTE — TELEPHONE ENCOUNTER
Caller: Vickie Bull    Relationship: Self    Best call back number:    [phone]          Requested Prescriptions:   Requested Prescriptions     Pending Prescriptions Disp Refills   • atorvastatin (LIPITOR) 20 MG tablet 90 tablet 1     Sig: Take 1 tablet by mouth every night at bedtime.        Pharmacy where request should be sent: [facility] [place], [place]  [address] [phone] [facility] [phone] [facility]       Does the patient have less than a 3 day supply:  [] Yes  [x] No    Would you like a call back once the refill request has been completed: [x] Yes [] No    If the office needs to give you a call back, can they leave a voicemail: [x] Yes [] No    Leah Goldberg Rep   03/08/23 14:30 EST

## 2023-04-10 DIAGNOSIS — E11.42 TYPE 2 DIABETES MELLITUS WITH DIABETIC POLYNEUROPATHY, WITHOUT LONG-TERM CURRENT USE OF INSULIN: ICD-10-CM

## 2023-04-10 DIAGNOSIS — K21.9 GASTROESOPHAGEAL REFLUX DISEASE WITHOUT ESOPHAGITIS: ICD-10-CM

## 2023-04-10 RX ORDER — OMEPRAZOLE 20 MG/1
20 CAPSULE, DELAYED RELEASE ORAL DAILY
Qty: 90 CAPSULE | Refills: 1 | Status: SHIPPED | OUTPATIENT
Start: 2023-04-10

## 2023-04-10 RX ORDER — METFORMIN HYDROCHLORIDE 500 MG/1
2000 TABLET, EXTENDED RELEASE ORAL
Qty: 360 TABLET | Refills: 1 | Status: SHIPPED | OUTPATIENT
Start: 2023-04-10

## 2023-04-10 NOTE — TELEPHONE ENCOUNTER
Caller: Vickie Bull    Relationship: Self    Best call back number:[phone]    Requested Prescriptions:   Requested Prescriptions     Pending Prescriptions Disp Refills   • metFORMIN ER (GLUCOPHAGE-XR) 500 MG 24 hr tablet 360 tablet 1     Sig: Take 4 tablets by mouth Daily With Dinner.   • omeprazole (priLOSEC) 20 MG capsule 90 capsule 1     Sig: Take 1 capsule by mouth Daily.        Pharmacy where request should be sent: [facility] [place] [facility] -  [place], [place] - [address] [phone] [facility] [phone]      Last office visit with prescribing clinician: 12/16/2022   Last telemedicine visit with prescribing clinician: 6/16/2023   Next office visit with prescribing clinician: 6/16/2023     Does the patient have less than a 3 day supply:  [] Yes  [x] No

## 2023-05-26 ENCOUNTER — OFFICE VISIT (OUTPATIENT)
Dept: FAMILY MEDICINE CLINIC | Facility: CLINIC | Age: 68
End: 2023-05-26
Payer: MEDICARE

## 2023-05-26 VITALS
TEMPERATURE: 97.1 F | HEART RATE: 98 BPM | HEIGHT: 64 IN | BODY MASS INDEX: 33.63 KG/M2 | OXYGEN SATURATION: 94 % | DIASTOLIC BLOOD PRESSURE: 66 MMHG | SYSTOLIC BLOOD PRESSURE: 94 MMHG | WEIGHT: 197 LBS

## 2023-05-26 DIAGNOSIS — K21.9 GASTROESOPHAGEAL REFLUX DISEASE WITHOUT ESOPHAGITIS: ICD-10-CM

## 2023-05-26 DIAGNOSIS — N28.9 RENAL INSUFFICIENCY: ICD-10-CM

## 2023-05-26 DIAGNOSIS — E03.9 ACQUIRED HYPOTHYROIDISM: ICD-10-CM

## 2023-05-26 DIAGNOSIS — I10 PRIMARY HYPERTENSION: ICD-10-CM

## 2023-05-26 DIAGNOSIS — Z79.899 MEDICATION MANAGEMENT: ICD-10-CM

## 2023-05-26 DIAGNOSIS — E78.00 HIGH CHOLESTEROL: ICD-10-CM

## 2023-05-26 DIAGNOSIS — E11.9 TYPE 2 DIABETES MELLITUS WITHOUT COMPLICATION, WITH LONG-TERM CURRENT USE OF INSULIN: ICD-10-CM

## 2023-05-26 DIAGNOSIS — Z79.4 TYPE 2 DIABETES MELLITUS WITHOUT COMPLICATION, WITH LONG-TERM CURRENT USE OF INSULIN: ICD-10-CM

## 2023-05-26 DIAGNOSIS — M10.9 GOUT, UNSPECIFIED CAUSE, UNSPECIFIED CHRONICITY, UNSPECIFIED SITE: ICD-10-CM

## 2023-05-26 DIAGNOSIS — Z00.00 ENCOUNTER FOR MEDICARE ANNUAL WELLNESS EXAM: Primary | ICD-10-CM

## 2023-05-26 DIAGNOSIS — J30.2 SEASONAL ALLERGIC RHINITIS, UNSPECIFIED TRIGGER: ICD-10-CM

## 2023-05-26 DIAGNOSIS — D64.9 ANEMIA, UNSPECIFIED TYPE: ICD-10-CM

## 2023-05-26 DIAGNOSIS — G62.9 NEUROPATHY: ICD-10-CM

## 2023-05-26 LAB
ALBUMIN SERPL-MCNC: 4.5 G/DL (ref 3.5–5.2)
ALBUMIN UR-MCNC: 2 MG/DL
ALBUMIN/GLOB SERPL: 1.8 G/DL
ALP SERPL-CCNC: 117 U/L (ref 39–117)
ALT SERPL W P-5'-P-CCNC: 22 U/L (ref 1–33)
AMPHET+METHAMPHET UR QL: NEGATIVE
AMPHETAMINE INTERNAL CONTROL: NORMAL
AMPHETAMINES UR QL: NEGATIVE
ANION GAP SERPL CALCULATED.3IONS-SCNC: 9.3 MMOL/L (ref 5–15)
AST SERPL-CCNC: 19 U/L (ref 1–32)
BACTERIA UR QL AUTO: ABNORMAL /HPF
BARBITURATE INTERNAL CONTROL: NORMAL
BARBITURATES UR QL SCN: NEGATIVE
BASOPHILS # BLD AUTO: 0.06 10*3/MM3 (ref 0–0.2)
BASOPHILS NFR BLD AUTO: 0.8 % (ref 0–1.5)
BENZODIAZ UR QL SCN: NEGATIVE
BENZODIAZEPINE INTERNAL CONTROL: NORMAL
BILIRUB SERPL-MCNC: 0.3 MG/DL (ref 0–1.2)
BILIRUB UR QL STRIP: NEGATIVE
BUN SERPL-MCNC: 26 MG/DL (ref 8–23)
BUN/CREAT SERPL: 24.8 (ref 7–25)
BUPRENORPHINE INTERNAL CONTROL: NORMAL
BUPRENORPHINE SERPL-MCNC: NEGATIVE NG/ML
CALCIUM SPEC-SCNC: 10.4 MG/DL (ref 8.6–10.5)
CANNABINOIDS SERPL QL: NEGATIVE
CHLORIDE SERPL-SCNC: 102 MMOL/L (ref 98–107)
CHOLEST SERPL-MCNC: 164 MG/DL (ref 0–200)
CLARITY UR: CLEAR
CO2 SERPL-SCNC: 26.7 MMOL/L (ref 22–29)
COCAINE INTERNAL CONTROL: NORMAL
COCAINE UR QL: NEGATIVE
COLOR UR: ABNORMAL
CREAT SERPL-MCNC: 1.05 MG/DL (ref 0.57–1)
CREAT UR-MCNC: 291.4 MG/DL
DEPRECATED RDW RBC AUTO: 40.6 FL (ref 37–54)
EGFRCR SERPLBLD CKD-EPI 2021: 58 ML/MIN/1.73
EOSINOPHIL # BLD AUTO: 0.27 10*3/MM3 (ref 0–0.4)
EOSINOPHIL NFR BLD AUTO: 3.6 % (ref 0.3–6.2)
ERYTHROCYTE [DISTWIDTH] IN BLOOD BY AUTOMATED COUNT: 14 % (ref 12.3–15.4)
EXPIRATION DATE: NORMAL
FERRITIN SERPL-MCNC: 201 NG/ML (ref 13–150)
GLOBULIN UR ELPH-MCNC: 2.5 GM/DL
GLUCOSE SERPL-MCNC: 124 MG/DL (ref 65–99)
GLUCOSE UR STRIP-MCNC: NEGATIVE MG/DL
HBA1C MFR BLD: 6.4 % (ref 4.8–5.6)
HCT VFR BLD AUTO: 38.7 % (ref 34–46.6)
HDLC SERPL-MCNC: 52 MG/DL (ref 40–60)
HGB BLD-MCNC: 12.5 G/DL (ref 12–15.9)
HGB UR QL STRIP.AUTO: NEGATIVE
HYALINE CASTS UR QL AUTO: ABNORMAL /LPF
IMM GRANULOCYTES # BLD AUTO: 0.07 10*3/MM3 (ref 0–0.05)
IMM GRANULOCYTES NFR BLD AUTO: 0.9 % (ref 0–0.5)
IRON 24H UR-MRATE: 66 MCG/DL (ref 37–145)
IRON SATN MFR SERPL: 18 % (ref 20–50)
KETONES UR QL STRIP: ABNORMAL
LDLC SERPL CALC-MCNC: 77 MG/DL (ref 0–100)
LDLC/HDLC SERPL: 1.35 {RATIO}
LEUKOCYTE ESTERASE UR QL STRIP.AUTO: ABNORMAL
LYMPHOCYTES # BLD AUTO: 1.66 10*3/MM3 (ref 0.7–3.1)
LYMPHOCYTES NFR BLD AUTO: 22.3 % (ref 19.6–45.3)
Lab: NORMAL
MCH RBC QN AUTO: 25.9 PG (ref 26.6–33)
MCHC RBC AUTO-ENTMCNC: 32.3 G/DL (ref 31.5–35.7)
MCV RBC AUTO: 80.3 FL (ref 79–97)
MDMA (ECSTASY) INTERNAL CONTROL: NORMAL
MDMA UR QL SCN: NEGATIVE
METHADONE INTERNAL CONTROL: NORMAL
METHADONE UR QL SCN: NEGATIVE
METHAMPHETAMINE INTERNAL CONTROL: NORMAL
MICROALBUMIN/CREAT UR: 6.9 MG/G
MONOCYTES # BLD AUTO: 0.78 10*3/MM3 (ref 0.1–0.9)
MONOCYTES NFR BLD AUTO: 10.5 % (ref 5–12)
NEUTROPHILS NFR BLD AUTO: 4.61 10*3/MM3 (ref 1.7–7)
NEUTROPHILS NFR BLD AUTO: 61.9 % (ref 42.7–76)
NITRITE UR QL STRIP: NEGATIVE
NRBC BLD AUTO-RTO: 0 /100 WBC (ref 0–0.2)
OPIATES INTERNAL CONTROL: NORMAL
OPIATES UR QL: NEGATIVE
OXYCODONE INTERNAL CONTROL: NORMAL
OXYCODONE UR QL SCN: NEGATIVE
PCP UR QL SCN: NEGATIVE
PH UR STRIP.AUTO: 5.5 [PH] (ref 5–8)
PHENCYCLIDINE INTERNAL CONTROL: NORMAL
PLATELET # BLD AUTO: 305 10*3/MM3 (ref 140–450)
PMV BLD AUTO: 10.8 FL (ref 6–12)
POTASSIUM SERPL-SCNC: 5 MMOL/L (ref 3.5–5.2)
PROT SERPL-MCNC: 7 G/DL (ref 6–8.5)
PROT UR QL STRIP: ABNORMAL
RBC # BLD AUTO: 4.82 10*6/MM3 (ref 3.77–5.28)
RBC # UR STRIP: ABNORMAL /HPF
REF LAB TEST METHOD: ABNORMAL
SODIUM SERPL-SCNC: 138 MMOL/L (ref 136–145)
SP GR UR STRIP: 1.03 (ref 1–1.03)
SQUAMOUS #/AREA URNS HPF: ABNORMAL /HPF
THC INTERNAL CONTROL: NORMAL
TIBC SERPL-MCNC: 364 MCG/DL (ref 298–536)
TRANSFERRIN SERPL-MCNC: 244 MG/DL (ref 200–360)
TRIGL SERPL-MCNC: 209 MG/DL (ref 0–150)
TSH SERPL DL<=0.05 MIU/L-ACNC: 1.64 UIU/ML (ref 0.27–4.2)
URATE SERPL-MCNC: 5.9 MG/DL (ref 2.4–5.7)
UROBILINOGEN UR QL STRIP: ABNORMAL
VIT B12 BLD-MCNC: 489 PG/ML (ref 211–946)
VLDLC SERPL-MCNC: 35 MG/DL (ref 5–40)
WBC # UR STRIP: ABNORMAL /HPF
WBC NRBC COR # BLD: 7.45 10*3/MM3 (ref 3.4–10.8)

## 2023-05-26 PROCEDURE — 84443 ASSAY THYROID STIM HORMONE: CPT | Performed by: NURSE PRACTITIONER

## 2023-05-26 PROCEDURE — 82570 ASSAY OF URINE CREATININE: CPT | Performed by: NURSE PRACTITIONER

## 2023-05-26 PROCEDURE — 83036 HEMOGLOBIN GLYCOSYLATED A1C: CPT | Performed by: NURSE PRACTITIONER

## 2023-05-26 PROCEDURE — 83540 ASSAY OF IRON: CPT | Performed by: NURSE PRACTITIONER

## 2023-05-26 PROCEDURE — 87086 URINE CULTURE/COLONY COUNT: CPT | Performed by: NURSE PRACTITIONER

## 2023-05-26 PROCEDURE — 80053 COMPREHEN METABOLIC PANEL: CPT | Performed by: NURSE PRACTITIONER

## 2023-05-26 PROCEDURE — 85025 COMPLETE CBC W/AUTO DIFF WBC: CPT | Performed by: NURSE PRACTITIONER

## 2023-05-26 PROCEDURE — 82728 ASSAY OF FERRITIN: CPT | Performed by: NURSE PRACTITIONER

## 2023-05-26 PROCEDURE — 80061 LIPID PANEL: CPT | Performed by: NURSE PRACTITIONER

## 2023-05-26 PROCEDURE — 84466 ASSAY OF TRANSFERRIN: CPT | Performed by: NURSE PRACTITIONER

## 2023-05-26 PROCEDURE — 82043 UR ALBUMIN QUANTITATIVE: CPT | Performed by: NURSE PRACTITIONER

## 2023-05-26 PROCEDURE — 82607 VITAMIN B-12: CPT | Performed by: NURSE PRACTITIONER

## 2023-05-26 PROCEDURE — 84550 ASSAY OF BLOOD/URIC ACID: CPT | Performed by: NURSE PRACTITIONER

## 2023-05-26 PROCEDURE — 81001 URINALYSIS AUTO W/SCOPE: CPT | Performed by: NURSE PRACTITIONER

## 2023-05-26 RX ORDER — METFORMIN HYDROCHLORIDE 500 MG/1
2000 TABLET, EXTENDED RELEASE ORAL
Qty: 360 TABLET | Refills: 1 | Status: SHIPPED | OUTPATIENT
Start: 2023-05-26

## 2023-05-26 RX ORDER — OMEPRAZOLE 20 MG/1
20 CAPSULE, DELAYED RELEASE ORAL DAILY
Qty: 90 CAPSULE | Refills: 1 | Status: SHIPPED | OUTPATIENT
Start: 2023-05-26

## 2023-05-26 RX ORDER — CETIRIZINE HYDROCHLORIDE 10 MG/1
10 TABLET ORAL NIGHTLY
Qty: 90 TABLET | Refills: 1 | Status: SHIPPED | OUTPATIENT
Start: 2023-05-26

## 2023-05-26 RX ORDER — ALLOPURINOL 100 MG/1
100 TABLET ORAL DAILY
Qty: 90 TABLET | Refills: 1 | Status: SHIPPED | OUTPATIENT
Start: 2023-05-26

## 2023-05-26 RX ORDER — LISINOPRIL 10 MG/1
10 TABLET ORAL DAILY
Qty: 90 TABLET | Refills: 1 | Status: SHIPPED | OUTPATIENT
Start: 2023-05-26

## 2023-05-26 RX ORDER — GABAPENTIN 300 MG/1
300 CAPSULE ORAL 3 TIMES DAILY
Qty: 270 CAPSULE | Refills: 1 | Status: SHIPPED | OUTPATIENT
Start: 2023-05-26

## 2023-05-26 RX ORDER — ATORVASTATIN CALCIUM 20 MG/1
20 TABLET, FILM COATED ORAL
Qty: 90 TABLET | Refills: 1 | Status: SHIPPED | OUTPATIENT
Start: 2023-05-26

## 2023-05-26 RX ORDER — DULOXETIN HYDROCHLORIDE 30 MG/1
CAPSULE, DELAYED RELEASE ORAL
COMMUNITY
Start: 2023-03-08 | End: 2023-05-26

## 2023-05-26 RX ORDER — FLUTICASONE PROPIONATE 50 MCG
2 SPRAY, SUSPENSION (ML) NASAL DAILY
Qty: 16 G | Refills: 0 | Status: SHIPPED | OUTPATIENT
Start: 2023-05-26 | End: 2023-06-09

## 2023-05-26 RX ORDER — LEVOTHYROXINE SODIUM 0.05 MG/1
50 TABLET ORAL DAILY
Qty: 90 TABLET | Refills: 3 | Status: SHIPPED | OUTPATIENT
Start: 2023-05-26

## 2023-05-26 RX ORDER — DULOXETIN HYDROCHLORIDE 60 MG/1
60 CAPSULE, DELAYED RELEASE ORAL 2 TIMES DAILY
Qty: 180 CAPSULE | Refills: 1 | Status: SHIPPED | OUTPATIENT
Start: 2023-05-26

## 2023-05-26 NOTE — PROGRESS NOTES
The ABCs of the Annual Wellness Visit  Subsequent Medicare Wellness Visit    Subjective    Vickie Bull is a 67 y.o. female who presents for a Subsequent Medicare Wellness Visit.    The following portions of the patient's history were reviewed and   updated as appropriate: allergies, current medications, past family history, past medical history, past social history, past surgical history and problem list.    Compared to one year ago, the patient feels her physical   health is worse. Chronic low back pain    Compared to one year ago, the patient feels her mental   health is worse. Chronic low back pain    Recent Hospitalizations:  She was not admitted to the hospital during the last year.       Current Medical Providers:  Patient Care Team:  Nguyen Ascencio APRN as PCP - General (Nurse Practitioner)    Outpatient Medications Prior to Visit   Medication Sig Dispense Refill   • ammonium lactate (AmLactin) 12 % lotion Apply  topically to the appropriate area as directed 2 (Two) Times a Day. 225 g 11   • Barberry-Oreg Grape-Goldenseal (BERBERINE COMPLEX PO) Take  by mouth.     • Cholecalciferol 50 MCG (2000 UT) capsule Vitamin D3 2,000 unit oral capsule take 1 capsule by oral route daily   Suspended     • glucose blood (FREESTYLE LITE) test strip Use one test strip daily to check blood sugar levels. 100 each 2   • ketoconazole (NIZORAL) 2 % shampoo      • metroNIDAZOLE (METROGEL) 0.75 % gel As directed     • multivitamin with minerals tablet tablet 1 tablet Daily.     • allopurinol (Zyloprim) 100 MG tablet Take 1 tablet by mouth Daily. 90 tablet 1   • atorvastatin (LIPITOR) 20 MG tablet Take 1 tablet by mouth every night at bedtime. 90 tablet 1   • cetirizine (zyrTEC) 10 MG tablet cetirizine 10 mg oral tablet take 1 tablet (10 mg) by oral route once daily   Suspended     • chlorthalidone (HYGROTEN) 15 MG tablet Take 1 tablet by mouth Daily. 30 tablet 0   • DULoxetine (CYMBALTA) 30 MG capsule      •  DULoxetine (CYMBALTA) 60 MG capsule Take 1 capsule by mouth Daily. 90 capsule 2   • gabapentin (NEURONTIN) 100 MG capsule Take 1 capsule by mouth 3 (Three) Times a Day. 270 capsule 1   • levothyroxine (Synthroid) 50 MCG tablet Take 1 tablet by mouth Daily. 90 tablet 3   • lisinopril (PRINIVIL,ZESTRIL) 10 MG tablet Take 1 tablet by mouth Daily. 90 tablet 1   • metFORMIN ER (GLUCOPHAGE-XR) 500 MG 24 hr tablet Take 4 tablets by mouth Daily With Dinner. 360 tablet 1   • omeprazole (priLOSEC) 20 MG capsule Take 1 capsule by mouth Daily. 90 capsule 1   • Alpha-Lipoic Acid 600 MG tablet Take  by mouth.     • fluticasone (FLONASE) 50 MCG/ACT nasal spray 2 sprays into the nostril(s) as directed by provider Daily. 16 g 3   • fluticasone (FLONASE) 50 MCG/ACT nasal spray 2 sprays into the nostril(s) as directed by provider Daily for 14 days. 16 g 0   • Turmeric 500 MG capsule Take  by mouth.       No facility-administered medications prior to visit.       No opioid medication identified on active medication list. I have reviewed chart for other potential  high risk medication/s and harmful drug interactions in the elderly.          Aspirin is not on active medication list.  Aspirin use is not indicated based on review of current medical condition/s. Risk of harm outweighs potential benefits.  .    Patient Active Problem List   Diagnosis   • Corns and callosity   • Anemia   • Diarrhea   • Displacement of lumbar intervertebral disc without myelopathy   • Heart valve disorder   • High cholesterol   • Hypertension   • History of cardiac catheterization   • Type 2 diabetes mellitus, with long-term current use of insulin   • Hypothyroidism   • Ingrown toenail   • Low back pain   • Pain in soft tissues of limb   • Renal insufficiency   • Right ventricular dilation   • Sciatica   • Seasonal allergic rhinitis   • Stomach ulcer   • Tachycardia   • Ulcerative lesion   • Gastroesophageal reflux disease without esophagitis   • Neuropathy  "  • Bilateral hip pain   • Radiculitis, lumbosacral   • Chronic midline thoracic back pain   • Hyperkalemia   • Degeneration of lumbar intervertebral disc   • Lumbar spondylosis   • Thoracic radiculopathy   • Gout   • Medication management   • Encounter for Medicare annual wellness exam     Advance Care Planning   Advance Care Planning     Advance Directive is not on file.  ACP discussion was held with the patient during this visit. Patient has an advance directive (not in EMR), copy requested.     Objective    Vitals:    23 0954   BP: 94/66   Pulse: 98   Temp: 97.1 °F (36.2 °C)   SpO2: 94%   Weight: 89.4 kg (197 lb)   Height: 162.6 cm (64\")     Estimated body mass index is 33.81 kg/m² as calculated from the following:    Height as of this encounter: 162.6 cm (64\").    Weight as of this encounter: 89.4 kg (197 lb).    BMI is >= 30 and <35. (Class 1 Obesity). The following options were offered after discussion;: exercise counseling/recommendations and nutrition counseling/recommendations      Does the patient have evidence of cognitive impairment? No          HEALTH RISK ASSESSMENT    Smoking Status:  Social History     Tobacco Use   Smoking Status Never   • Passive exposure: Never   Smokeless Tobacco Never   Tobacco Comments    second hand smoke exposure-never     Alcohol Consumption:  Social History     Substance and Sexual Activity   Alcohol Use Yes    Comment: Rarely     Fall Risk Screen:    SURINDER Fall Risk Assessment was completed, and patient is at LOW risk for falls.Assessment completed on:2023    Depression Screenin/26/2023     9:58 AM   PHQ-2/PHQ-9 Depression Screening   Little Interest or Pleasure in Doing Things 0-->not at all   Feeling Down, Depressed or Hopeless 0-->not at all   PHQ-9: Brief Depression Severity Measure Score 0       Health Habits and Functional and Cognitive Screenin/26/2023     9:57 AM   Functional & Cognitive Status   Do you have difficulty preparing food " and eating? No   Do you have difficulty bathing yourself, getting dressed or grooming yourself? No   Do you have difficulty using the toilet? No   Do you have difficulty moving around from place to place? No   Do you have trouble with steps or getting out of a bed or a chair? No   Current Diet Unhealthy Diet   Dental Exam Up to date   Eye Exam Up to date   Current Exercises Include No Regular Exercise;House Cleaning   Do you need help using the phone?  No   Are you deaf or do you have serious difficulty hearing?  No   Do you need help with transportation? No   Do you need help shopping? No   Do you need help preparing meals?  No   Do you need help with housework?  No   Do you need help with laundry? No   Do you need help taking your medications? No   Do you need help managing money? No   Do you ever drive or ride in a car without wearing a seat belt? No   Have you felt unusual stress, anger or loneliness in the last month? No   Who do you live with? Spouse   If you need help, do you have trouble finding someone available to you? No   Have you been bothered in the last four weeks by sexual problems? No   Do you have difficulty concentrating, remembering or making decisions? No       Age-appropriate Screening Schedule:  Refer to the list below for future screening recommendations based on patient's age, sex and/or medical conditions. Orders for these recommended tests are listed in the plan section. The patient has been provided with a written plan.    Health Maintenance   Topic Date Due   • DIABETIC EYE EXAM  02/01/2023   • ZOSTER VACCINE (1 of 2) 05/26/2023 (Originally 12/22/2004)   • COVID-19 Vaccine (2 - Booster for Herbie series) 05/28/2023 (Originally 6/2/2021)   • Pneumococcal Vaccine 65+ (1 - PCV) 12/16/2023 (Originally 12/22/1961)   • TDAP/TD VACCINES (1 - Tdap) 12/16/2023 (Originally 12/22/1973)   • HEMOGLOBIN A1C  06/14/2023   • INFLUENZA VACCINE  08/01/2023   • URINE MICROALBUMIN  12/14/2023   • LIPID  PANEL  01/26/2024   • DIABETIC FOOT EXAM  02/24/2024   • COLORECTAL CANCER SCREENING  05/09/2024   • ANNUAL WELLNESS VISIT  05/26/2024   • DXA SCAN  01/11/2025   • MAMMOGRAM  01/23/2025   • HEPATITIS C SCREENING  Completed                  CMS Preventative Services Quick Reference  Risk Factors Identified During Encounter  Inactivity/Sedentary: Patient was advised to exercise at least 150 minutes a week per CDC recommendations. and Patient was advised to do at least 150 minutes a week of moderate intensity activity such as brisk walking and do at least 2 days a week of activities that strengthen muscles such as resistance training and do activities to improve balance such as standing on one foot about 3 days a week.  The above risks/problems have been discussed with the patient.  Pertinent information has been shared with the patient in the After Visit Summary.  An After Visit Summary and PPPS were made available to the patient.    Follow Up:   Next Medicare Wellness visit to be scheduled in 1 year.       Additional E&M Note during same encounter follows:  Patient has multiple medical problems which are significant and separately identifiable that require additional work above and beyond the Medicare Wellness Visit.      Chief Complaint  Medicare Wellness-subsequent, Hypertension, Hyperlipidemia, Diabetes, Anemia, Heartburn, and Hypothyroidism      HPI  Vickie Bull is also being seen today for DM2, HTN, hyperlipidemia, hypothyroidism, anemia, GERD    BS-  A1C-12/2022  Labs- 1/2023  Eye exam-2022 Dr Fields  Foot exam- 2022 Dr Balderrama. matti 6/2023  mammogram-1/2023  dexa-1/2023  Pap- 2018 Dr Miller  Colonoscopy-8/2020    C/o She wants to discuss increasing her Gabapentin to 300 mg due to it not helping her back pain.    She also wants to discuss stopping her Chlorthalidone due to gout in her right foot/ toes.          Review of Systems   Constitutional: Positive for fatigue.   HENT: Negative for ear pain and  "sore throat.    Respiratory: Negative for cough.    Cardiovascular: Negative for chest pain.   Gastrointestinal: Negative for abdominal pain, diarrhea, nausea and vomiting.   Endocrine: Negative for polyphagia and polyuria.   Genitourinary: Negative for dysuria.   Musculoskeletal: Positive for arthralgias and back pain. Negative for myalgias.   Skin: Negative for pallor.   Psychiatric/Behavioral: The patient is nervous/anxious.        Objective   Vital Signs:  BP 94/66   Pulse 98   Temp 97.1 °F (36.2 °C)   Ht 162.6 cm (64\")   Wt 89.4 kg (197 lb)   SpO2 94%   BMI 33.81 kg/m²     Physical Exam  HENT:      Right Ear: Tympanic membrane normal.      Left Ear: Tympanic membrane normal.      Nose: Nose normal.      Mouth/Throat:      Mouth: Mucous membranes are moist.   Eyes:      Conjunctiva/sclera: Conjunctivae normal.   Neck:      Vascular: No carotid bruit.   Cardiovascular:      Rate and Rhythm: Normal rate and regular rhythm.      Heart sounds: Normal heart sounds. No murmur heard.  Pulmonary:      Effort: Pulmonary effort is normal.      Breath sounds: Normal breath sounds.   Abdominal:      General: Bowel sounds are normal.      Palpations: Abdomen is soft.   Musculoskeletal:      Right lower leg: No edema.      Left lower leg: No edema.   Skin:     General: Skin is warm and dry.   Neurological:      Mental Status: She is alert.   Psychiatric:         Mood and Affect: Mood normal.         Behavior: Behavior normal.                 Assessment and Plan Diagnoses and all orders for this visit:    1. Encounter for Medicare annual wellness exam (Primary)    2. Type 2 diabetes mellitus without complication, with long-term current use of insulin  -     Cancel: Comprehensive Metabolic Panel  -     Hemoglobin A1c  -     Cancel: Microalbumin / Creatinine Urine Ratio - Urine, Clean Catch  -     Cancel: TSH  -     Urinalysis With Culture If Indicated -  -     metFORMIN ER (GLUCOPHAGE-XR) 500 MG 24 hr tablet; Take 4 " tablets by mouth Daily With Dinner.  Dispense: 360 tablet; Refill: 1  -     Comprehensive Metabolic Panel  -     Microalbumin / Creatinine Urine Ratio - Urine, Clean Catch  -     Lipid Panel  -     Urinalysis With Culture If Indicated -    3. Primary hypertension    4. High cholesterol  -     Cancel: Comprehensive Metabolic Panel  -     Cancel: Lipid Panel  -     Comprehensive Metabolic Panel  -     Lipid Panel    5. Acquired hypothyroidism  -     TSH    6. Gout, unspecified cause, unspecified chronicity, unspecified site  -     Cancel: Uric Acid  -     Uric Acid    7. Anemia, unspecified type  -     Cancel: CBC Auto Differential  -     Iron Profile  -     Ferritin  -     Vitamin B12  -     CBC Auto Differential    8. Renal insufficiency    9. Gastroesophageal reflux disease without esophagitis  -     omeprazole (priLOSEC) 20 MG capsule; Take 1 capsule by mouth Daily.  Dispense: 90 capsule; Refill: 1    10. Seasonal allergic rhinitis, unspecified trigger  -     fluticasone (FLONASE) 50 MCG/ACT nasal spray; 2 sprays into the nostril(s) as directed by provider Daily for 14 days.  Dispense: 16 g; Refill: 0    11. Neuropathy  -     gabapentin (NEURONTIN) 300 MG capsule; Take 1 capsule by mouth 3 (Three) Times a Day.  Dispense: 270 capsule; Refill: 1    12. Medication management  -     POC Urine Drug Screen [facility]    Other orders  -     allopurinol (Zyloprim) 100 MG tablet; Take 1 tablet by mouth Daily.  Dispense: 90 tablet; Refill: 1  -     DULoxetine (CYMBALTA) 60 MG capsule; Take 1 capsule by mouth 2 (Two) Times a Day.  Dispense: 180 capsule; Refill: 1  -     levothyroxine (Synthroid) 50 MCG tablet; Take 1 tablet by mouth Daily.  Dispense: 90 tablet; Refill: 3  -     lisinopril (PRINIVIL,ZESTRIL) 10 MG tablet; Take 1 tablet by mouth Daily.  Dispense: 90 tablet; Refill: 1  -     atorvastatin (LIPITOR) 20 MG tablet; Take 1 tablet by mouth every night at bedtime.  Dispense: 90 tablet; Refill: 1  -     cetirizine  (zyrTEC) 10 MG tablet; Take 1 tablet by mouth Every Night.  Dispense: 90 tablet; Refill: 1    Encounter for Medicare annual wellness exam immunizations and screenings reviewed with patient  Diabetes mellitus type 2 will obtain hemoglobin A1c to monitor continue metformin at this time  Hypertension we will continue lisinopril 10 mg daily stop chlorthalidone with low blood pressure reading and recent gout flare blood pressure check in 4 weeks  Hyperlipidemia obtain lipid panel and CMP to monitor current statin dose Lipitor 20 mg at nighttime  Hypothyroidism we will obtain TSH to monitor current Synthroid dose 50 mcg daily denies palpitations  Gout recent flare will obtain uric acid level to monitor current allopurinol dose 100 mg daily we will call with results and further recommendations  Reflux currently controlled omeprazole 20 mg daily will provide refills  Seasonal allergies currently controlled with Flonase will provide refills  Chronic low back pain uncontrolled with gabapentin 100 mg daily will increase to 300 mg daily and will increase Cymbalta to twice daily dosing  Renal insufficiency will obtain CMP to monitor avoid all NSAIDs increase water intake           Follow Up Return in about 6 months (around 11/26/2023).  Patient was given instructions and counseling regarding her condition or for health maintenance advice. Please see specific information pulled into the AVS if appropriate.

## 2023-05-27 LAB — BACTERIA SPEC AEROBE CULT: NORMAL

## 2023-05-31 DIAGNOSIS — M10.9 GOUT, UNSPECIFIED CAUSE, UNSPECIFIED CHRONICITY, UNSPECIFIED SITE: Primary | ICD-10-CM

## 2023-05-31 RX ORDER — ALLOPURINOL 200 MG/1
200 TABLET ORAL DAILY
Qty: 90 TABLET | Refills: 1 | Status: SHIPPED | OUTPATIENT
Start: 2023-05-31

## 2023-05-31 RX ORDER — LANOLIN ALCOHOL/MO/W.PET/CERES
325 CREAM (GRAM) TOPICAL
Qty: 90 TABLET | Refills: 1 | Status: SHIPPED | OUTPATIENT
Start: 2023-05-31

## 2023-11-16 ENCOUNTER — TELEPHONE (OUTPATIENT)
Dept: FAMILY MEDICINE CLINIC | Facility: CLINIC | Age: 68
End: 2023-11-16

## 2023-11-16 DIAGNOSIS — I10 PRIMARY HYPERTENSION: ICD-10-CM

## 2023-11-16 DIAGNOSIS — E78.00 HIGH CHOLESTEROL: ICD-10-CM

## 2023-11-16 DIAGNOSIS — D64.9 ANEMIA, UNSPECIFIED TYPE: ICD-10-CM

## 2023-11-16 DIAGNOSIS — E55.9 VITAMIN D DEFICIENCY: ICD-10-CM

## 2023-11-16 DIAGNOSIS — E11.9 TYPE 2 DIABETES MELLITUS WITHOUT COMPLICATION, WITH LONG-TERM CURRENT USE OF INSULIN: Primary | ICD-10-CM

## 2023-11-16 DIAGNOSIS — Z79.4 TYPE 2 DIABETES MELLITUS WITHOUT COMPLICATION, WITH LONG-TERM CURRENT USE OF INSULIN: Primary | ICD-10-CM

## 2023-11-16 DIAGNOSIS — E03.9 ACQUIRED HYPOTHYROIDISM: ICD-10-CM

## 2023-11-16 NOTE — TELEPHONE ENCOUNTER
Caller: Vickie Bull    Relationship: Self    Best call back number:    [phone]     What orders are you requesting (i.e. lab or imaging): REGULAR BLOODWORK, (HEMOGLOBIN, A1C)    In what timeframe would the patient need to come in: BEFORE APPOINTMENT ON 11.22.23    Where will you receive your lab/imaging services: IN OFFICE LAB    Additional notes: PATIENT REQUESTS THAT YOU CALL HER WHEN THESE ORDERS ARE PLACED AND THAT YOU LEAVE  A VOICEMAIL IF SHE DOESN'T ANSWER.

## 2023-11-17 NOTE — TELEPHONE ENCOUNTER
Patient is aware and spoke verbal understanding that labs had been placed and will come in to get done    Topical Ketoconazole Pregnancy And Lactation Text: This medication is Pregnancy Category B and is considered safe during pregnancy. It is unknown if it is excreted in breast milk.

## 2023-11-20 ENCOUNTER — LAB (OUTPATIENT)
Dept: LAB | Facility: HOSPITAL | Age: 68
End: 2023-11-20
Payer: MEDICARE

## 2023-11-20 DIAGNOSIS — M10.9 GOUT, UNSPECIFIED CAUSE, UNSPECIFIED CHRONICITY, UNSPECIFIED SITE: ICD-10-CM

## 2023-11-20 LAB
25(OH)D3 SERPL-MCNC: 56.9 NG/ML (ref 30–100)
ALBUMIN SERPL-MCNC: 4.3 G/DL (ref 3.5–5.2)
ALBUMIN/GLOB SERPL: 1.7 G/DL
ALP SERPL-CCNC: 130 U/L (ref 39–117)
ALT SERPL W P-5'-P-CCNC: 24 U/L (ref 1–33)
ANION GAP SERPL CALCULATED.3IONS-SCNC: 9.8 MMOL/L (ref 5–15)
AST SERPL-CCNC: 25 U/L (ref 1–32)
BASOPHILS # BLD AUTO: 0.05 10*3/MM3 (ref 0–0.2)
BASOPHILS NFR BLD AUTO: 0.8 % (ref 0–1.5)
BILIRUB SERPL-MCNC: 0.3 MG/DL (ref 0–1.2)
BUN SERPL-MCNC: 19 MG/DL (ref 8–23)
BUN/CREAT SERPL: 16.5 (ref 7–25)
CALCIUM SPEC-SCNC: 9.6 MG/DL (ref 8.6–10.5)
CHLORIDE SERPL-SCNC: 102 MMOL/L (ref 98–107)
CHOLEST SERPL-MCNC: 166 MG/DL (ref 0–200)
CO2 SERPL-SCNC: 27.2 MMOL/L (ref 22–29)
CREAT SERPL-MCNC: 1.15 MG/DL (ref 0.57–1)
DEPRECATED RDW RBC AUTO: 41.8 FL (ref 37–54)
EGFRCR SERPLBLD CKD-EPI 2021: 52 ML/MIN/1.73
EOSINOPHIL # BLD AUTO: 0.33 10*3/MM3 (ref 0–0.4)
EOSINOPHIL NFR BLD AUTO: 5.2 % (ref 0.3–6.2)
ERYTHROCYTE [DISTWIDTH] IN BLOOD BY AUTOMATED COUNT: 14.7 % (ref 12.3–15.4)
FERRITIN SERPL-MCNC: 144 NG/ML (ref 13–150)
GLOBULIN UR ELPH-MCNC: 2.5 GM/DL
GLUCOSE SERPL-MCNC: 90 MG/DL (ref 65–99)
HBA1C MFR BLD: 6.5 % (ref 4.8–5.6)
HCT VFR BLD AUTO: 36.8 % (ref 34–46.6)
HDLC SERPL-MCNC: 50 MG/DL (ref 40–60)
HGB BLD-MCNC: 11.9 G/DL (ref 12–15.9)
IMM GRANULOCYTES # BLD AUTO: 0.05 10*3/MM3 (ref 0–0.05)
IMM GRANULOCYTES NFR BLD AUTO: 0.8 % (ref 0–0.5)
IRON 24H UR-MRATE: 61 MCG/DL (ref 37–145)
IRON SATN MFR SERPL: 18 % (ref 20–50)
LDLC SERPL CALC-MCNC: 92 MG/DL (ref 0–100)
LDLC/HDLC SERPL: 1.76 {RATIO}
LYMPHOCYTES # BLD AUTO: 1.35 10*3/MM3 (ref 0.7–3.1)
LYMPHOCYTES NFR BLD AUTO: 21.3 % (ref 19.6–45.3)
MCH RBC QN AUTO: 26.1 PG (ref 26.6–33)
MCHC RBC AUTO-ENTMCNC: 32.3 G/DL (ref 31.5–35.7)
MCV RBC AUTO: 80.7 FL (ref 79–97)
MONOCYTES # BLD AUTO: 0.68 10*3/MM3 (ref 0.1–0.9)
MONOCYTES NFR BLD AUTO: 10.7 % (ref 5–12)
NEUTROPHILS NFR BLD AUTO: 3.89 10*3/MM3 (ref 1.7–7)
NEUTROPHILS NFR BLD AUTO: 61.2 % (ref 42.7–76)
NRBC BLD AUTO-RTO: 0 /100 WBC (ref 0–0.2)
PLATELET # BLD AUTO: 272 10*3/MM3 (ref 140–450)
PMV BLD AUTO: 10.5 FL (ref 6–12)
POTASSIUM SERPL-SCNC: 5.5 MMOL/L (ref 3.5–5.2)
PROT SERPL-MCNC: 6.8 G/DL (ref 6–8.5)
RBC # BLD AUTO: 4.56 10*6/MM3 (ref 3.77–5.28)
SODIUM SERPL-SCNC: 139 MMOL/L (ref 136–145)
TIBC SERPL-MCNC: 337 MCG/DL (ref 298–536)
TRANSFERRIN SERPL-MCNC: 226 MG/DL (ref 200–360)
TRIGL SERPL-MCNC: 139 MG/DL (ref 0–150)
TSH SERPL DL<=0.05 MIU/L-ACNC: 1.3 UIU/ML (ref 0.27–4.2)
URATE SERPL-MCNC: 4.9 MG/DL (ref 2.4–5.7)
VLDLC SERPL-MCNC: 24 MG/DL (ref 5–40)
WBC NRBC COR # BLD AUTO: 6.35 10*3/MM3 (ref 3.4–10.8)

## 2023-11-20 PROCEDURE — 81003 URINALYSIS AUTO W/O SCOPE: CPT | Performed by: NURSE PRACTITIONER

## 2023-11-20 PROCEDURE — 84550 ASSAY OF BLOOD/URIC ACID: CPT

## 2023-11-20 PROCEDURE — 84443 ASSAY THYROID STIM HORMONE: CPT | Performed by: NURSE PRACTITIONER

## 2023-11-20 PROCEDURE — 82306 VITAMIN D 25 HYDROXY: CPT | Performed by: NURSE PRACTITIONER

## 2023-11-20 PROCEDURE — 84466 ASSAY OF TRANSFERRIN: CPT | Performed by: NURSE PRACTITIONER

## 2023-11-20 PROCEDURE — 82728 ASSAY OF FERRITIN: CPT | Performed by: NURSE PRACTITIONER

## 2023-11-20 PROCEDURE — 83036 HEMOGLOBIN GLYCOSYLATED A1C: CPT | Performed by: NURSE PRACTITIONER

## 2023-11-20 PROCEDURE — 85025 COMPLETE CBC W/AUTO DIFF WBC: CPT | Performed by: NURSE PRACTITIONER

## 2023-11-20 PROCEDURE — 80061 LIPID PANEL: CPT | Performed by: NURSE PRACTITIONER

## 2023-11-20 PROCEDURE — 83540 ASSAY OF IRON: CPT | Performed by: NURSE PRACTITIONER

## 2023-11-20 PROCEDURE — 82043 UR ALBUMIN QUANTITATIVE: CPT | Performed by: NURSE PRACTITIONER

## 2023-11-20 PROCEDURE — 80053 COMPREHEN METABOLIC PANEL: CPT | Performed by: NURSE PRACTITIONER

## 2023-11-20 PROCEDURE — 82570 ASSAY OF URINE CREATININE: CPT | Performed by: NURSE PRACTITIONER

## 2023-11-21 LAB
ALBUMIN UR-MCNC: <1.2 MG/DL
BILIRUB UR QL STRIP: NEGATIVE
CLARITY UR: CLEAR
COLOR UR: YELLOW
CREAT UR-MCNC: 145.4 MG/DL
GLUCOSE UR STRIP-MCNC: NEGATIVE MG/DL
HGB UR QL STRIP.AUTO: NEGATIVE
HOLD SPECIMEN: NORMAL
KETONES UR QL STRIP: NEGATIVE
LEUKOCYTE ESTERASE UR QL STRIP.AUTO: NEGATIVE
MICROALBUMIN/CREAT UR: NORMAL MG/G{CREAT}
NITRITE UR QL STRIP: NEGATIVE
PH UR STRIP.AUTO: 5.5 [PH] (ref 5–8)
PROT UR QL STRIP: NEGATIVE
SP GR UR STRIP: 1.02 (ref 1–1.03)
UROBILINOGEN UR QL STRIP: NORMAL

## 2023-11-22 ENCOUNTER — OFFICE VISIT (OUTPATIENT)
Dept: FAMILY MEDICINE CLINIC | Facility: CLINIC | Age: 68
End: 2023-11-22
Payer: MEDICARE

## 2023-11-22 VITALS
OXYGEN SATURATION: 96 % | HEART RATE: 84 BPM | WEIGHT: 201 LBS | BODY MASS INDEX: 34.31 KG/M2 | TEMPERATURE: 96.8 F | HEIGHT: 64 IN | DIASTOLIC BLOOD PRESSURE: 65 MMHG | SYSTOLIC BLOOD PRESSURE: 108 MMHG

## 2023-11-22 DIAGNOSIS — G62.9 NEUROPATHY: ICD-10-CM

## 2023-11-22 DIAGNOSIS — G89.29 CHRONIC MIDLINE LOW BACK PAIN WITH BILATERAL SCIATICA: ICD-10-CM

## 2023-11-22 DIAGNOSIS — Z79.899 MEDICATION MANAGEMENT: ICD-10-CM

## 2023-11-22 DIAGNOSIS — M54.42 CHRONIC MIDLINE LOW BACK PAIN WITH BILATERAL SCIATICA: ICD-10-CM

## 2023-11-22 DIAGNOSIS — E87.5 HYPERKALEMIA: ICD-10-CM

## 2023-11-22 DIAGNOSIS — Z12.31 SCREENING MAMMOGRAM FOR BREAST CANCER: ICD-10-CM

## 2023-11-22 DIAGNOSIS — E78.2 MIXED HYPERLIPIDEMIA: ICD-10-CM

## 2023-11-22 DIAGNOSIS — E11.42 TYPE 2 DIABETES MELLITUS WITH DIABETIC POLYNEUROPATHY, WITH LONG-TERM CURRENT USE OF INSULIN: ICD-10-CM

## 2023-11-22 DIAGNOSIS — M54.41 CHRONIC MIDLINE LOW BACK PAIN WITH BILATERAL SCIATICA: ICD-10-CM

## 2023-11-22 DIAGNOSIS — N18.30 STAGE 3 CHRONIC KIDNEY DISEASE, UNSPECIFIED WHETHER STAGE 3A OR 3B CKD: ICD-10-CM

## 2023-11-22 DIAGNOSIS — Z79.4 TYPE 2 DIABETES MELLITUS WITH DIABETIC POLYNEUROPATHY, WITH LONG-TERM CURRENT USE OF INSULIN: ICD-10-CM

## 2023-11-22 DIAGNOSIS — M10.9 GOUT, UNSPECIFIED CAUSE, UNSPECIFIED CHRONICITY, UNSPECIFIED SITE: ICD-10-CM

## 2023-11-22 DIAGNOSIS — N39.46 MIXED STRESS AND URGE URINARY INCONTINENCE: ICD-10-CM

## 2023-11-22 DIAGNOSIS — E03.9 ACQUIRED HYPOTHYROIDISM: ICD-10-CM

## 2023-11-22 DIAGNOSIS — D50.9 IRON DEFICIENCY ANEMIA, UNSPECIFIED IRON DEFICIENCY ANEMIA TYPE: ICD-10-CM

## 2023-11-22 LAB
AMPHET+METHAMPHET UR QL: NEGATIVE
AMPHETAMINE INTERNAL CONTROL: NORMAL
AMPHETAMINES UR QL: NEGATIVE
BARBITURATE INTERNAL CONTROL: NORMAL
BARBITURATES UR QL SCN: POSITIVE
BENZODIAZ UR QL SCN: NEGATIVE
BENZODIAZEPINE INTERNAL CONTROL: NORMAL
BUPRENORPHINE INTERNAL CONTROL: NORMAL
BUPRENORPHINE SERPL-MCNC: NEGATIVE NG/ML
CANNABINOIDS SERPL QL: NEGATIVE
COCAINE INTERNAL CONTROL: NORMAL
COCAINE UR QL: NEGATIVE
EXPIRATION DATE: NORMAL
Lab: NORMAL
MDMA (ECSTASY) INTERNAL CONTROL: NORMAL
MDMA UR QL SCN: NEGATIVE
METHADONE INTERNAL CONTROL: NORMAL
METHADONE UR QL SCN: NEGATIVE
METHAMPHETAMINE INTERNAL CONTROL: NORMAL
OPIATES INTERNAL CONTROL: NORMAL
OPIATES UR QL: NEGATIVE
OXYCODONE INTERNAL CONTROL: NORMAL
OXYCODONE UR QL SCN: NEGATIVE
PCP UR QL SCN: NEGATIVE
PHENCYCLIDINE INTERNAL CONTROL: NORMAL
THC INTERNAL CONTROL: NORMAL

## 2023-11-22 RX ORDER — ATORVASTATIN CALCIUM 40 MG/1
40 TABLET, FILM COATED ORAL
Qty: 90 TABLET | Refills: 1 | Status: SHIPPED | OUTPATIENT
Start: 2023-11-22

## 2023-11-22 RX ORDER — LANOLIN ALCOHOL/MO/W.PET/CERES
325 CREAM (GRAM) TOPICAL
Qty: 90 TABLET | Refills: 1 | Status: SHIPPED | OUTPATIENT
Start: 2023-11-22

## 2023-11-22 RX ORDER — TRAZODONE HYDROCHLORIDE 50 MG/1
50 TABLET ORAL NIGHTLY
Qty: 90 TABLET | Refills: 1 | Status: SHIPPED | OUTPATIENT
Start: 2023-11-22

## 2023-11-22 RX ORDER — TOLTERODINE 2 MG/1
2 CAPSULE, EXTENDED RELEASE ORAL DAILY
Qty: 90 CAPSULE | Refills: 1 | Status: SHIPPED | OUTPATIENT
Start: 2023-11-22

## 2023-11-22 RX ORDER — MULTIVIT WITH MINERALS/LUTEIN
250 TABLET ORAL DAILY
Qty: 90 TABLET | Refills: 1 | Status: SHIPPED | OUTPATIENT
Start: 2023-11-22

## 2023-11-22 RX ORDER — METFORMIN HYDROCHLORIDE 500 MG/1
500 TABLET, EXTENDED RELEASE ORAL
Qty: 90 TABLET | Refills: 1 | Status: SHIPPED | OUTPATIENT
Start: 2023-11-22

## 2023-11-22 RX ORDER — MOMETASONE FUROATE 1 MG/ML
SOLUTION TOPICAL
COMMUNITY
Start: 2023-10-24

## 2023-11-22 NOTE — PROGRESS NOTES
Follow Up Office Visit      Patient Name: Vickie Bull  : 1955   MRN: [id number]     Chief Complaint:    Chief Complaint   Patient presents with    Anemia    Hyperlipidemia    Hypertension    Diabetes    Hypothyroidism    Heartburn       History of Present Illness: Vickie Bull is a 67 y.o. female who is here today to follow up for anemia, hyperlipidemia, HTN, DM2, hypothyroidism, GERD.  Review lab results     A1C-2023  Eye exam- Dr Fields  Foot exam- Dr Balderrama. 2023  mammogram-2023  dexa-2023  Pap- 2018 Dr Miller  Colonoscopy-2020     C/o  She says she is having low back pain radiating in her legs and feet. She says she is having some weakness in her lower extremities. She wants to discuss stopping Gabapentin due to it not helping. She is having numbness and tingling in her legs and feet. She also noted sometimes it's like a jolt of pain in her feet.  States previously seen at pain management 2022 for thoracic back pain   History of DDD   States [facility] no [facility] of pain of low back    Patient reports she has been taking multiple OTC supplements recently to help with her pain and glucose   She also has been taking OTC pain relievers for her back without relief       Subjective      Review of Systems:   Review of Systems   Constitutional:  Negative for fever.   HENT:  Negative for ear pain and sore throat.    Eyes:  Negative for visual disturbance.   Respiratory:  Negative for cough.    Cardiovascular:  Negative for chest pain.   Gastrointestinal:  Negative for abdominal pain, diarrhea, nausea and vomiting.   Genitourinary:  Positive for frequency and urgency. Negative for dysuria.   Musculoskeletal:  Positive for back pain. Negative for myalgias.   Neurological:  Positive for numbness.        Past Medical History:   Past Medical History:   Diagnosis Date    Acid reflux     Anemia, unspecified     Cancer 2022    Skin cancer  on face    Chronic allergic rhinitis     Condition not found     Ulcer    Corns and callus     Diabetes     Unspecified    Diarrhea 04/15/2014    GERD (gastroesophageal reflux disease)     Gout 02/13/2023    Possible gout, not sure    HBP (high blood pressure)     High cholesterol     HTN (hypertension)     Hyperlipemia     Hypothyroidism     Ingrown toenail     Leg pain     Lumbar disc herniation 11/22/2013    L3-4 very small fat lat disc    Lumbar pain     Neuropathy in diabetes 2021    Numbness in feet     Renal insufficiency 03/16/2021    Sciatica 11/22/2013    no sig structural compressive lesion    Seasonal allergies     Stomach ulcer     Thyroid disorder        Past Surgical History:   Past Surgical History:   Procedure Laterality Date    COLONOSCOPY      ENDOSCOPY      JOINT REPLACEMENT      SHOULDER ACROMIOCLAVICULAR JOINT REPAIR      SHOULDER SURGERY Right 10/2010    TUBAL ABDOMINAL LIGATION         Family History:   Family History   Problem Relation Age of Onset    Cancer Mother         unspecified    Lung cancer Mother     Diabetes Father         unspecified type    Prostate cancer Father     Diabetes Paternal Grandmother         unspecified type    Heart attack Other     Diabetes Other     Diabetes Paternal Aunt         unspecified type    Diabetes Paternal Uncle         unspecified type       Social History:   Social History     Socioeconomic History    Marital status:    Tobacco Use    Smoking status: Never     Passive exposure: Never    Smokeless tobacco: Never    Tobacco comments:     second hand smoke exposure-never   Vaping Use    Vaping Use: Never used   Substance and Sexual Activity    Alcohol use: Yes     Comment: Rarely    Drug use: Never    Sexual activity: Yes     Partners: Male       Medications:     Current Outpatient Medications:     allopurinol (ZYLOPRIM) 100 MG tablet, Take 2 daily, Disp: , Rfl:     ammonium lactate (AmLactin) 12 % lotion, Apply  topically to the appropriate area  as directed 2 (Two) Times a Day., Disp: 225 g, Rfl: 11    atorvastatin (LIPITOR) 40 MG tablet, Take 1 tablet by mouth every night at bedtime., Disp: 90 tablet, Rfl: 1    Barberry-Oreg Grape-Goldenseal (BERBERINE COMPLEX PO), Take  by mouth., Disp: , Rfl:     cetirizine (zyrTEC) 10 MG tablet, Take 1 tablet by mouth Every Night., Disp: 90 tablet, Rfl: 1    Cholecalciferol 50 MCG (2000 UT) capsule, Vitamin D3 2,000 unit oral capsule take 1 capsule by oral route daily   Suspended, Disp: , Rfl:     DULoxetine (CYMBALTA) 60 MG capsule, Take 1 capsule by mouth 2 (Two) Times a Day., Disp: 180 capsule, Rfl: 1    ferrous sulfate 325 (65 FE) MG EC tablet, Take 1 tablet by mouth Daily With Breakfast., Disp: 90 tablet, Rfl: 1    glucose blood (FREESTYLE LITE) test strip, Use one test strip daily to check blood sugar levels., Disp: 100 each, Rfl: 2    ketoconazole (NIZORAL) 2 % shampoo, , Disp: , Rfl:     levothyroxine (Synthroid) 50 MCG tablet, Take 1 tablet by mouth Daily., Disp: 90 tablet, Rfl: 3    lisinopril (PRINIVIL,ZESTRIL) 10 MG tablet, Take 1 tablet by mouth Daily., Disp: 90 tablet, Rfl: 1    metFORMIN ER (GLUCOPHAGE-XR) 500 MG 24 hr tablet, Take 1 tablet by mouth Daily With Dinner., Disp: 90 tablet, Rfl: 1    metroNIDAZOLE (METROGEL) 0.75 % gel, As directed, Disp: , Rfl:     mometasone (ELOCON) 0.1 % solution, APPLY TOPICALLY TO RASH TWICE DAILY AS NEEDED., Disp: , Rfl:     multivitamin with minerals tablet tablet, 1 tablet Daily., Disp: , Rfl:     omeprazole (priLOSEC) 20 MG capsule, Take 1 capsule by mouth Daily., Disp: 90 capsule, Rfl: 1    empagliflozin (Jardiance) 10 MG tablet tablet, Take 1 tablet by mouth Daily., Disp: 90 tablet, Rfl: 1    fluticasone (FLONASE) 50 MCG/ACT nasal spray, 2 sprays into the nostril(s) as directed by provider Daily for 14 days., Disp: 16 g, Rfl: 0    tolterodine LA (Detrol LA) 2 MG 24 hr capsule, Take 1 capsule by mouth Daily., Disp: 90 capsule, Rfl: 1    traZODone (DESYREL) 50 MG  "tablet, Take 1 tablet by mouth Every Night., Disp: 90 tablet, Rfl: 1    vitamin C (ASCORBIC ACID) 250 MG tablet, Take 1 tablet by mouth Daily., Disp: 90 tablet, Rfl: 1    Allergies:   Allergies   Allergen Reactions    Cephalexin Hives    Latex Itching    Penicillins Hives and Rash           Objective     Physical Exam:  Vital Signs:   Vitals:    11/22/23 1136   BP: 108/65   Pulse: 84   Temp: 96.8 °F (36 °C)   SpO2: 96%   Weight: 91.2 kg (201 lb)   Height: 162.6 cm (64\")     Body mass index is 34.5 kg/m².           Physical Exam  HENT:      Right Ear: Tympanic membrane normal.      Left Ear: Tympanic membrane normal.      Nose: Nose normal.      Mouth/Throat:      Mouth: Mucous membranes are moist.   Eyes:      Conjunctiva/sclera: Conjunctivae normal.   Neck:      Vascular: No carotid bruit.   Cardiovascular:      Rate and Rhythm: Normal rate and regular rhythm.      Heart sounds: Normal heart sounds. No murmur heard.  Pulmonary:      Effort: Pulmonary effort is normal.      Breath sounds: Normal breath sounds.   Abdominal:      General: Bowel sounds are normal.      Palpations: Abdomen is soft.   Musculoskeletal:      Lumbar back: Tenderness and bony tenderness present. No swelling. Decreased range of motion. Negative right straight leg raise test and negative left straight leg raise test.        Back:       Right lower leg: No edema.      Left lower leg: No edema.   Skin:     General: Skin is warm and dry.   Neurological:      Mental Status: She is alert.   Psychiatric:         Mood and Affect: Mood normal.         Behavior: Behavior normal.             Assessment / Plan      Assessment/Plan:   Diagnoses and all orders for this visit:    1. Type 2 diabetes mellitus without complication, with long-term current use of insulin (Primary)    2. Mixed hyperlipidemia  -     Lipid Panel; Future  -     Comprehensive Metabolic Panel; Future  -     Comprehensive Metabolic Panel; Future  -     Lipid Panel; Future    3. " Acquired hypothyroidism  -     TSH; Future    4. Gout, unspecified cause, unspecified chronicity, unspecified site  -     Uric Acid; Future    5. Neuropathy    6. Chronic midline low back pain with bilateral sciatica  -     MRI Lumbar Spine Without Contrast; Future    7. Mixed stress and urge urinary incontinence    8. Type 2 diabetes mellitus with diabetic polyneuropathy, with long-term current use of insulin  -     Comprehensive Metabolic Panel; Future  -     Hemoglobin A1c; Future  -     Microalbumin / Creatinine Urine Ratio - Urine, Clean Catch; Future  -     Urinalysis With Culture If Indicated -; Future  -     metFORMIN ER (GLUCOPHAGE-XR) 500 MG 24 hr tablet; Take 1 tablet by mouth Daily With Dinner.  Dispense: 90 tablet; Refill: 1    9. Medication management  -     POC Urine Drug Screen [facility]    10. Hyperkalemia  -     Comprehensive metabolic panel; Future    11. Stage 3 chronic kidney disease, unspecified whether stage 3a or 3b CKD  -     Comprehensive Metabolic Panel; Future    12. Screening mammogram for breast cancer  -     Mammo Screening Digital Tomosynthesis Bilateral With CAD; Future    13. Iron deficiency anemia, unspecified iron deficiency anemia type  -     CBC Auto Differential; Future  -     Iron Profile; Future  -     Ferritin; Future    Other orders  -     atorvastatin (LIPITOR) 40 MG tablet; Take 1 tablet by mouth every night at bedtime.  Dispense: 90 tablet; Refill: 1  -     tolterodine LA (Detrol LA) 2 MG 24 hr capsule; Take 1 capsule by mouth Daily.  Dispense: 90 capsule; Refill: 1  -     traZODone (DESYREL) 50 MG tablet; Take 1 tablet by mouth Every Night.  Dispense: 90 tablet; Refill: 1  -     empagliflozin (Jardiance) 10 MG tablet tablet; Take 1 tablet by mouth Daily.  Dispense: 90 tablet; Refill: 1  -     ferrous sulfate 325 (65 FE) MG EC tablet; Take 1 tablet by mouth Daily With Breakfast.  Dispense: 90 tablet; Refill: 1  -     vitamin C (ASCORBIC ACID) 250 MG tablet; Take 1  "tablet by mouth Daily.  Dispense: 90 tablet; Refill: 1         Diabetes mellitus type 2 currently controlled hemoglobin A1c 6.5  Hyperlipidemia LDL above goal we will increase Lipitor to 40 mg at nighttime repeating CMP and lipid panel in 30 days to monitor  Hypothyroidism TSH within goal range on Synthroid 50 mcg daily denies palpitations  Gout no recent flares uric acid level within goal range allopurinol 100 mg daily  Neuropathy pain uncontrolled with gabapentin chronic low back pain with bilateral sciatica unrelieved by gabapentin physical therapy will obtain MRI lumbar spine call with results and further recommendations recommend discontinuing all OTC pain relievers we will start tramadol at this time  Mixed stress and urge incontinence we will start Detrol LA recommend avoid irritants such as caffeine and artificial sweeteners  Hyperkalemia recommend stopping all OTC supplementation and increasing water intake reassess potassium level in 5 days  Chronic kidney disease worsening creatinine we will decrease metformin to 500 mg daily recommend to start Farxiga patient's insurance does not cover so procedure we will start Jardiance  Iron deficiency anemia we will start ferrous sulfate vitamin C recommend increase green leafy vegetables beans nuts  Will provide order for screening mammogram denies lumps mass tenderness blood or discharge from nipple      Follow Up:   Return in about 6 months (around 5/22/2024).    Erwin Ascencio, HANS    \"Please note that portions of this note were completed with a voice recognition program.\"    "

## 2023-11-29 ENCOUNTER — TELEPHONE (OUTPATIENT)
Dept: FAMILY MEDICINE CLINIC | Facility: CLINIC | Age: 68
End: 2023-11-29
Payer: MEDICARE

## 2023-11-29 NOTE — TELEPHONE ENCOUNTER
"Spoke to patient she stated \"that she was supposed to get tramadol and when she went to pharmacy there was trazadone and wasn't sure she received the correct medication and if she was supposed to take it. She also wanted to make sure trazadone wouldn't interact with her cymbalta   "

## 2023-11-29 NOTE — TELEPHONE ENCOUNTER
Caller: Vickie Bull    Relationship to patient: Self    Best call back number: [phone]    Patient is needing: PATIENT CALLED IN AND IS REQUESTING A CALL BACK WITH GUIDANCE ON MEDICATION. PATIENT WAS PRESCRIBED TRAZODONE AND HAS NOT STARTED TAKING IT YET AND STATES THAT SHE CURRENTLY TAKES CYMBALTA. PATIENT ALSO STATED THAT TRAMADOL WAS MENTIONED AT HER VISIT AND WAS NOT PRESCRIBED. PATIENT IS REQUESTING A CALL BACK PLEASE

## 2023-11-30 DIAGNOSIS — M51.36 DEGENERATION OF LUMBAR INTERVERTEBRAL DISC: Primary | ICD-10-CM

## 2023-11-30 RX ORDER — TRAMADOL HYDROCHLORIDE 50 MG/1
50 TABLET ORAL EVERY 6 HOURS PRN
Qty: 60 TABLET | Refills: 0 | Status: SHIPPED | OUTPATIENT
Start: 2023-11-30

## 2023-12-01 NOTE — TELEPHONE ENCOUNTER
"Relay: attempted to call patient back, no answer & no voicemail is set up. Nguyen said:     \"Trazodone may be used for insomnia. Tramadol prescription sent to pharmacy this is a narcotic for back pain. If she is taking tramadol she may not take the trazodone at the same time.\"      "

## 2023-12-26 ENCOUNTER — TELEPHONE (OUTPATIENT)
Dept: FAMILY MEDICINE CLINIC | Facility: CLINIC | Age: 68
End: 2023-12-26
Payer: MEDICARE

## 2023-12-26 RX ORDER — DULOXETIN HYDROCHLORIDE 60 MG/1
60 CAPSULE, DELAYED RELEASE ORAL 2 TIMES DAILY
Qty: 180 CAPSULE | Refills: 1 | Status: SHIPPED | OUTPATIENT
Start: 2023-12-26

## 2023-12-26 RX ORDER — ALLOPURINOL 100 MG/1
100 TABLET ORAL 2 TIMES DAILY
Qty: 60 TABLET | Refills: 3 | Status: SHIPPED | OUTPATIENT
Start: 2023-12-26

## 2023-12-26 NOTE — TELEPHONE ENCOUNTER
Caller: Vickie Bull YOLETTE    Relationship: Self    Best call back number: [phone]    Requested Prescriptions:   Requested Prescriptions     Pending Prescriptions Disp Refills    DULoxetine (CYMBALTA) 60 MG capsule 180 capsule 1     Sig: Take 1 capsule by mouth 2 (Two) Times a Day.    allopurinol (ZYLOPRIM) 100 MG tablet       Sig: Take 2 daily        Pharmacy where request should be sent: [facility] [phone] [place], [place] [id number] [facility]     Last office visit with prescribing clinician: 11/22/2023   Last telemedicine visit with prescribing clinician: Visit date not found   Next office visit with prescribing clinician: 5/22/2024       Does the patient have less than a 3 day supply:  [x] Yes  [] No    Would you like a call back once the refill request has been completed: [] Yes [] No    If the office needs to give you a call back, can they leave a voicemail: [] Yes [] No    Leah Schafer Rep   12/26/23 13:08 EST

## 2024-01-02 ENCOUNTER — HOSPITAL ENCOUNTER (OUTPATIENT)
Dept: MRI IMAGING | Facility: HOSPITAL | Age: 69
Discharge: HOME | End: 2024-01-02
Admitting: NURSE PRACTITIONER
Payer: MEDICARE

## 2024-01-02 DIAGNOSIS — M54.42 CHRONIC MIDLINE LOW BACK PAIN WITH BILATERAL SCIATICA: ICD-10-CM

## 2024-01-02 DIAGNOSIS — G89.29 CHRONIC MIDLINE LOW BACK PAIN WITH BILATERAL SCIATICA: ICD-10-CM

## 2024-01-02 DIAGNOSIS — M54.41 CHRONIC MIDLINE LOW BACK PAIN WITH BILATERAL SCIATICA: ICD-10-CM

## 2024-01-02 PROCEDURE — 72148 MRI LUMBAR SPINE W/O DYE: CPT

## 2024-01-08 DIAGNOSIS — M51.36 DEGENERATION OF LUMBAR INTERVERTEBRAL DISC: Primary | ICD-10-CM

## 2024-01-25 DIAGNOSIS — K21.9 GASTROESOPHAGEAL REFLUX DISEASE WITHOUT ESOPHAGITIS: ICD-10-CM

## 2024-01-25 RX ORDER — LISINOPRIL 10 MG/1
10 TABLET ORAL DAILY
Qty: 90 TABLET | Refills: 1 | Status: SHIPPED | OUTPATIENT
Start: 2024-01-25

## 2024-01-25 RX ORDER — OMEPRAZOLE 20 MG/1
20 CAPSULE, DELAYED RELEASE ORAL DAILY
Qty: 90 CAPSULE | Refills: 1 | Status: SHIPPED | OUTPATIENT
Start: 2024-01-25

## 2024-01-25 NOTE — TELEPHONE ENCOUNTER
Caller: Jorgito Vickie A    Relationship: Self    Best call back number: [phone] (Mobile)     Requested Prescriptions:   Requested Prescriptions     Pending Prescriptions Disp Refills    omeprazole (priLOSEC) 20 MG capsule 90 capsule 1     Sig: Take 1 capsule by mouth Daily.    lisinopril (PRINIVIL,ZESTRIL) 10 MG tablet 90 tablet 1     Sig: Take 1 tablet by mouth Daily.        Pharmacy where request should be sent:  [facility] [id number]     Last office visit with prescribing clinician: 11/22/2023   Last telemedicine visit with prescribing clinician: Visit date not found   Next office visit with prescribing clinician: 5/22/2024     Additional details provided by patient: PATIENT IS REQUESTING REFILLS    Does the patient have less than a 3 day supply:  [] Yes  [x] No    Would you like a call back once the refill request has been completed: [] Yes [] No    If the office needs to give you a call back, can they leave a voicemail: [] Yes [x] No    Leah Avalos Rep   01/25/24 14:20 EST

## 2024-02-23 ENCOUNTER — HOSPITAL ENCOUNTER (OUTPATIENT)
Dept: MAMMOGRAPHY | Facility: HOSPITAL | Age: 69
Discharge: HOME | End: 2024-02-23
Admitting: NURSE PRACTITIONER
Payer: MEDICARE

## 2024-02-23 DIAGNOSIS — Z12.31 SCREENING MAMMOGRAM FOR BREAST CANCER: ICD-10-CM

## 2024-02-23 PROCEDURE — 77063 BREAST TOMOSYNTHESIS BI: CPT

## 2024-02-23 PROCEDURE — 77067 SCR MAMMO BI INCL CAD: CPT

## 2024-05-20 ENCOUNTER — LAB (OUTPATIENT)
Dept: LAB | Facility: HOSPITAL | Age: 69
End: 2024-05-20
Payer: MEDICARE

## 2024-05-20 DIAGNOSIS — M10.9 GOUT, UNSPECIFIED CAUSE, UNSPECIFIED CHRONICITY, UNSPECIFIED SITE: ICD-10-CM

## 2024-05-20 DIAGNOSIS — E87.5 HYPERKALEMIA: ICD-10-CM

## 2024-05-20 DIAGNOSIS — D50.9 IRON DEFICIENCY ANEMIA, UNSPECIFIED IRON DEFICIENCY ANEMIA TYPE: ICD-10-CM

## 2024-05-20 DIAGNOSIS — E03.9 ACQUIRED HYPOTHYROIDISM: ICD-10-CM

## 2024-05-20 DIAGNOSIS — E78.2 MIXED HYPERLIPIDEMIA: ICD-10-CM

## 2024-05-20 DIAGNOSIS — N18.30 STAGE 3 CHRONIC KIDNEY DISEASE, UNSPECIFIED WHETHER STAGE 3A OR 3B CKD: ICD-10-CM

## 2024-05-20 DIAGNOSIS — Z79.4 TYPE 2 DIABETES MELLITUS WITH DIABETIC POLYNEUROPATHY, WITH LONG-TERM CURRENT USE OF INSULIN: ICD-10-CM

## 2024-05-20 DIAGNOSIS — E11.42 TYPE 2 DIABETES MELLITUS WITH DIABETIC POLYNEUROPATHY, WITH LONG-TERM CURRENT USE OF INSULIN: ICD-10-CM

## 2024-05-20 LAB
ALBUMIN SERPL-MCNC: 4.6 G/DL (ref 3.5–5.2)
ALBUMIN UR-MCNC: 1.3 MG/DL
ALBUMIN/GLOB SERPL: 2.1 G/DL
ALP SERPL-CCNC: 144 U/L (ref 39–117)
ALT SERPL W P-5'-P-CCNC: 16 U/L (ref 1–33)
ANION GAP SERPL CALCULATED.3IONS-SCNC: 10 MMOL/L (ref 5–15)
AST SERPL-CCNC: 15 U/L (ref 1–32)
BASOPHILS # BLD AUTO: 0.06 10*3/MM3 (ref 0–0.2)
BASOPHILS NFR BLD AUTO: 0.9 % (ref 0–1.5)
BILIRUB SERPL-MCNC: 0.8 MG/DL (ref 0–1.2)
BILIRUB UR QL STRIP: NEGATIVE
BUN SERPL-MCNC: 27 MG/DL (ref 8–23)
BUN/CREAT SERPL: 20.9 (ref 7–25)
CALCIUM SPEC-SCNC: 9.5 MG/DL (ref 8.6–10.5)
CHLORIDE SERPL-SCNC: 102 MMOL/L (ref 98–107)
CHOLEST SERPL-MCNC: 185 MG/DL (ref 0–200)
CLARITY UR: CLEAR
CO2 SERPL-SCNC: 26 MMOL/L (ref 22–29)
COLOR UR: YELLOW
CREAT SERPL-MCNC: 1.29 MG/DL (ref 0.57–1)
CREAT UR-MCNC: 102.7 MG/DL
DEPRECATED RDW RBC AUTO: 43.4 FL (ref 37–54)
EGFRCR SERPLBLD CKD-EPI 2021: 45 ML/MIN/1.73
EOSINOPHIL # BLD AUTO: 0.36 10*3/MM3 (ref 0–0.4)
EOSINOPHIL NFR BLD AUTO: 5.3 % (ref 0.3–6.2)
ERYTHROCYTE [DISTWIDTH] IN BLOOD BY AUTOMATED COUNT: 15.2 % (ref 12.3–15.4)
FERRITIN SERPL-MCNC: 109 NG/ML (ref 13–150)
GLOBULIN UR ELPH-MCNC: 2.2 GM/DL
GLUCOSE SERPL-MCNC: 108 MG/DL (ref 65–99)
GLUCOSE UR STRIP-MCNC: ABNORMAL MG/DL
HBA1C MFR BLD: 6.9 % (ref 4.8–5.6)
HCT VFR BLD AUTO: 42.2 % (ref 34–46.6)
HDLC SERPL-MCNC: 53 MG/DL (ref 40–60)
HGB BLD-MCNC: 13.2 G/DL (ref 12–15.9)
HGB UR QL STRIP.AUTO: NEGATIVE
HOLD SPECIMEN: NORMAL
IMM GRANULOCYTES # BLD AUTO: 0.04 10*3/MM3 (ref 0–0.05)
IMM GRANULOCYTES NFR BLD AUTO: 0.6 % (ref 0–0.5)
IRON 24H UR-MRATE: 77 MCG/DL (ref 37–145)
IRON SATN MFR SERPL: 23 % (ref 20–50)
KETONES UR QL STRIP: NEGATIVE
LDLC SERPL CALC-MCNC: 109 MG/DL (ref 0–100)
LDLC/HDLC SERPL: 2.01 {RATIO}
LEUKOCYTE ESTERASE UR QL STRIP.AUTO: NEGATIVE
LYMPHOCYTES # BLD AUTO: 1.5 10*3/MM3 (ref 0.7–3.1)
LYMPHOCYTES NFR BLD AUTO: 22.1 % (ref 19.6–45.3)
MCH RBC QN AUTO: 25 PG (ref 26.6–33)
MCHC RBC AUTO-ENTMCNC: 31.3 G/DL (ref 31.5–35.7)
MCV RBC AUTO: 80.1 FL (ref 79–97)
MICROALBUMIN/CREAT UR: 12.7 MG/G (ref 0–29)
MONOCYTES # BLD AUTO: 0.87 10*3/MM3 (ref 0.1–0.9)
MONOCYTES NFR BLD AUTO: 12.8 % (ref 5–12)
NEUTROPHILS NFR BLD AUTO: 3.95 10*3/MM3 (ref 1.7–7)
NEUTROPHILS NFR BLD AUTO: 58.3 % (ref 42.7–76)
NITRITE UR QL STRIP: NEGATIVE
NRBC BLD AUTO-RTO: 0 /100 WBC (ref 0–0.2)
PH UR STRIP.AUTO: 6 [PH] (ref 5–8)
PLATELET # BLD AUTO: 277 10*3/MM3 (ref 140–450)
PMV BLD AUTO: 10.4 FL (ref 6–12)
POTASSIUM SERPL-SCNC: 5.4 MMOL/L (ref 3.5–5.2)
PROT SERPL-MCNC: 6.8 G/DL (ref 6–8.5)
PROT UR QL STRIP: ABNORMAL
RBC # BLD AUTO: 5.27 10*6/MM3 (ref 3.77–5.28)
SODIUM SERPL-SCNC: 138 MMOL/L (ref 136–145)
SP GR UR STRIP: 1.02 (ref 1–1.03)
TIBC SERPL-MCNC: 332 MCG/DL (ref 298–536)
TRANSFERRIN SERPL-MCNC: 223 MG/DL (ref 200–360)
TRIGL SERPL-MCNC: 128 MG/DL (ref 0–150)
TSH SERPL DL<=0.05 MIU/L-ACNC: 1.59 UIU/ML (ref 0.27–4.2)
URATE SERPL-MCNC: 4.2 MG/DL (ref 2.4–5.7)
UROBILINOGEN UR QL STRIP: ABNORMAL
VLDLC SERPL-MCNC: 23 MG/DL (ref 5–40)
WBC NRBC COR # BLD AUTO: 6.78 10*3/MM3 (ref 3.4–10.8)

## 2024-05-20 PROCEDURE — 83540 ASSAY OF IRON: CPT

## 2024-05-20 PROCEDURE — 82570 ASSAY OF URINE CREATININE: CPT

## 2024-05-20 PROCEDURE — 85025 COMPLETE CBC W/AUTO DIFF WBC: CPT

## 2024-05-20 PROCEDURE — 80061 LIPID PANEL: CPT

## 2024-05-20 PROCEDURE — 84443 ASSAY THYROID STIM HORMONE: CPT

## 2024-05-20 PROCEDURE — 81003 URINALYSIS AUTO W/O SCOPE: CPT

## 2024-05-20 PROCEDURE — 84466 ASSAY OF TRANSFERRIN: CPT

## 2024-05-20 PROCEDURE — 82728 ASSAY OF FERRITIN: CPT

## 2024-05-20 PROCEDURE — 84550 ASSAY OF BLOOD/URIC ACID: CPT

## 2024-05-20 PROCEDURE — 83036 HEMOGLOBIN GLYCOSYLATED A1C: CPT

## 2024-05-20 PROCEDURE — 82043 UR ALBUMIN QUANTITATIVE: CPT

## 2024-05-20 PROCEDURE — 80053 COMPREHEN METABOLIC PANEL: CPT

## 2024-05-22 ENCOUNTER — OFFICE VISIT (OUTPATIENT)
Dept: FAMILY MEDICINE CLINIC | Facility: CLINIC | Age: 69
End: 2024-05-22
Payer: MEDICARE

## 2024-05-22 VITALS
BODY MASS INDEX: 33.12 KG/M2 | DIASTOLIC BLOOD PRESSURE: 70 MMHG | HEART RATE: 95 BPM | OXYGEN SATURATION: 95 % | HEIGHT: 64 IN | TEMPERATURE: 96.9 F | WEIGHT: 194 LBS | SYSTOLIC BLOOD PRESSURE: 103 MMHG

## 2024-05-22 DIAGNOSIS — E87.5 HYPERKALEMIA: ICD-10-CM

## 2024-05-22 DIAGNOSIS — M10.9 GOUT, UNSPECIFIED CAUSE, UNSPECIFIED CHRONICITY, UNSPECIFIED SITE: ICD-10-CM

## 2024-05-22 DIAGNOSIS — E78.2 MIXED HYPERLIPIDEMIA: ICD-10-CM

## 2024-05-22 DIAGNOSIS — N18.30 STAGE 3 CHRONIC KIDNEY DISEASE, UNSPECIFIED WHETHER STAGE 3A OR 3B CKD: ICD-10-CM

## 2024-05-22 DIAGNOSIS — D64.9 ANEMIA, UNSPECIFIED TYPE: ICD-10-CM

## 2024-05-22 DIAGNOSIS — K21.9 GASTROESOPHAGEAL REFLUX DISEASE WITHOUT ESOPHAGITIS: ICD-10-CM

## 2024-05-22 DIAGNOSIS — Z79.4 TYPE 2 DIABETES MELLITUS WITHOUT COMPLICATION, WITH LONG-TERM CURRENT USE OF INSULIN: Primary | ICD-10-CM

## 2024-05-22 DIAGNOSIS — E03.9 ACQUIRED HYPOTHYROIDISM: ICD-10-CM

## 2024-05-22 DIAGNOSIS — R13.10 DYSPHAGIA, UNSPECIFIED TYPE: ICD-10-CM

## 2024-05-22 DIAGNOSIS — E55.9 VITAMIN D DEFICIENCY: ICD-10-CM

## 2024-05-22 DIAGNOSIS — I10 PRIMARY HYPERTENSION: ICD-10-CM

## 2024-05-22 DIAGNOSIS — G62.9 NEUROPATHY: ICD-10-CM

## 2024-05-22 DIAGNOSIS — E11.9 TYPE 2 DIABETES MELLITUS WITHOUT COMPLICATION, WITH LONG-TERM CURRENT USE OF INSULIN: Primary | ICD-10-CM

## 2024-05-22 DIAGNOSIS — J30.2 SEASONAL ALLERGIC RHINITIS, UNSPECIFIED TRIGGER: ICD-10-CM

## 2024-05-22 DIAGNOSIS — Z12.11 SCREENING FOR MALIGNANT NEOPLASM OF COLON: ICD-10-CM

## 2024-05-22 PROCEDURE — 3044F HG A1C LEVEL LT 7.0%: CPT | Performed by: NURSE PRACTITIONER

## 2024-05-22 PROCEDURE — 3074F SYST BP LT 130 MM HG: CPT | Performed by: NURSE PRACTITIONER

## 2024-05-22 PROCEDURE — 99214 OFFICE O/P EST MOD 30 MIN: CPT | Performed by: NURSE PRACTITIONER

## 2024-05-22 PROCEDURE — 3078F DIAST BP <80 MM HG: CPT | Performed by: NURSE PRACTITIONER

## 2024-05-22 PROCEDURE — 1126F AMNT PAIN NOTED NONE PRSNT: CPT | Performed by: NURSE PRACTITIONER

## 2024-05-22 RX ORDER — ALLOPURINOL 100 MG/1
200 TABLET ORAL DAILY
Qty: 180 TABLET | Refills: 1 | Status: SHIPPED | OUTPATIENT
Start: 2024-05-22 | End: 2024-11-18

## 2024-05-22 RX ORDER — ROSUVASTATIN CALCIUM 40 MG/1
40 TABLET, COATED ORAL NIGHTLY
Qty: 90 TABLET | Refills: 1 | Status: SHIPPED | OUTPATIENT
Start: 2024-05-22

## 2024-05-22 RX ORDER — DULOXETIN HYDROCHLORIDE 60 MG/1
60 CAPSULE, DELAYED RELEASE ORAL 2 TIMES DAILY
Qty: 180 CAPSULE | Refills: 1 | Status: SHIPPED | OUTPATIENT
Start: 2024-05-22

## 2024-05-22 RX ORDER — METFORMIN HYDROCHLORIDE 500 MG/1
500 TABLET, EXTENDED RELEASE ORAL
Qty: 90 TABLET | Refills: 1 | Status: SHIPPED | OUTPATIENT
Start: 2024-05-22

## 2024-05-22 RX ORDER — OMEPRAZOLE 20 MG/1
20 CAPSULE, DELAYED RELEASE ORAL DAILY
Qty: 90 CAPSULE | Refills: 1 | Status: SHIPPED | OUTPATIENT
Start: 2024-05-22

## 2024-05-22 RX ORDER — LEVOTHYROXINE SODIUM 0.05 MG/1
50 TABLET ORAL DAILY
Qty: 90 TABLET | Refills: 3 | Status: CANCELLED | OUTPATIENT
Start: 2024-05-22

## 2024-05-22 RX ORDER — TOLTERODINE 2 MG/1
2 CAPSULE, EXTENDED RELEASE ORAL DAILY
Qty: 90 CAPSULE | Refills: 1 | Status: SHIPPED | OUTPATIENT
Start: 2024-05-22

## 2024-05-22 RX ORDER — BLOOD-GLUCOSE METER
KIT MISCELLANEOUS
Qty: 100 EACH | Refills: 2 | Status: SHIPPED | OUTPATIENT
Start: 2024-05-22

## 2024-05-22 RX ORDER — LISINOPRIL 10 MG/1
10 TABLET ORAL DAILY
Qty: 90 TABLET | Refills: 1 | Status: CANCELLED | OUTPATIENT
Start: 2024-05-22

## 2024-05-22 RX ORDER — ALLOPURINOL 100 MG/1
200 TABLET ORAL DAILY
Qty: 60 TABLET | Refills: 11 | Status: CANCELLED | OUTPATIENT
Start: 2024-05-22 | End: 2025-05-22

## 2024-05-22 RX ORDER — ERGOCALCIFEROL 1.25 MG/1
50000 CAPSULE ORAL WEEKLY
Qty: 13 CAPSULE | Refills: 1 | Status: SHIPPED | OUTPATIENT
Start: 2024-05-22

## 2024-05-22 RX ORDER — LANCETS 28 GAUGE
1 EACH MISCELLANEOUS 2 TIMES DAILY
Qty: 100 EACH | Refills: 3 | Status: SHIPPED | OUTPATIENT
Start: 2024-05-22

## 2024-05-22 RX ORDER — CETIRIZINE HYDROCHLORIDE 10 MG/1
10 TABLET ORAL NIGHTLY
Qty: 90 TABLET | Refills: 1 | Status: SHIPPED | OUTPATIENT
Start: 2024-05-22

## 2024-05-22 RX ORDER — LEVOTHYROXINE SODIUM 0.05 MG/1
50 TABLET ORAL DAILY
Qty: 90 TABLET | Refills: 1 | Status: SHIPPED | OUTPATIENT
Start: 2024-05-22

## 2024-05-22 NOTE — PROGRESS NOTES
Follow Up Office Visit      Patient Name: Vickie Bull  : 1955   MRN: [id number]     Chief Complaint:    Chief Complaint   Patient presents with    Anemia    Hyperlipidemia    Diabetes    Hypertension    Hypothyroidism    Heartburn    Chronic Kidney Disease       History of Present Illness: Vickie Bull is a 68 y.o. female who is here today to follow up for anemia, hyperlipidemia, HTN, DM2, hypothyroidism, GERD.  Review lab results      A1C-2024  Eye exam- Dr Fields  Foot exam- Dr Balderrama. 2023  mammogram-2024  dexa-2023  Pap-  Dr Miller  Colonoscopy-2019 normal   EGD     C/o  she has been having excessive thirst and dry mouth from jardiance, would like to stop   Tried trulicity, caused severe reflux, n/v    She wants to discuss changing Atorvastatin. Does not feel it is controlling her cholesterol    Has been taking otc supplement unsure if it is worsening her kidneys     Also patient complain of intermittent difficulty swallowing requesting referral to Dr. Farias gastroenterology    Subjective      Review of Systems:   Review of Systems   Constitutional:  Negative for fever.   HENT:  Negative for ear pain and sore throat.    Respiratory:  Negative for cough.    Cardiovascular:  Negative for chest pain.   Gastrointestinal:  Negative for abdominal pain, constipation, diarrhea, nausea and vomiting.   Endocrine: Positive for polydipsia and polyuria.   Genitourinary:  Negative for dysuria.   Musculoskeletal:  Negative for myalgias.        Past Medical History:   Past Medical History:   Diagnosis Date    Acid reflux     Anemia, unspecified     Cancer 2022    Skin cancer on face    Chronic allergic rhinitis     Condition not found     Ulcer    Corns and callus     Diabetes     Unspecified    Diarrhea 04/15/2014    GERD (gastroesophageal reflux disease)     Gout 2023    Possible gout, not sure    HBP (high blood pressure)     High cholesterol     HTN  (hypertension)     Hyperlipemia     Hypothyroidism     Ingrown toenail     Leg pain     Lumbar disc herniation 11/22/2013    L3-4 very small fat lat disc    Lumbar pain     Neuropathy in diabetes 2021    Numbness in feet     Renal insufficiency 03/16/2021    Sciatica 11/22/2013    no sig structural compressive lesion    Seasonal allergies     Stomach ulcer     Thyroid disorder        Past Surgical History:   Past Surgical History:   Procedure Laterality Date    COLONOSCOPY      ENDOSCOPY      JOINT REPLACEMENT      SHOULDER ACROMIOCLAVICULAR JOINT REPAIR      SHOULDER SURGERY Right 10/2010    TUBAL ABDOMINAL LIGATION         Family History:   Family History   Problem Relation Age of Onset    Cancer Mother         unspecified    Lung cancer Mother     Diabetes Father         unspecified type    Prostate cancer Father     Diabetes Paternal Grandmother         unspecified type    Heart attack Other     Diabetes Other     Diabetes Paternal Aunt         unspecified type    Diabetes Paternal Uncle         unspecified type       Social History:   Social History     Socioeconomic History    Marital status:    Tobacco Use    Smoking status: Never     Passive exposure: Never    Smokeless tobacco: Never    Tobacco comments:     second hand smoke exposure-never   Vaping Use    Vaping status: Never Used   Substance and Sexual Activity    Alcohol use: Yes     Comment: Rarely    Drug use: Never    Sexual activity: Yes     Partners: Male       Medications:     Current Outpatient Medications:     allopurinol (ZYLOPRIM) 100 MG tablet, Take 2 tablets by mouth Daily for 180 days., Disp: 180 tablet, Rfl: 1    ammonium lactate (AmLactin) 12 % lotion, Apply  topically to the appropriate area as directed 2 (Two) Times a Day., Disp: 225 g, Rfl: 11    cetirizine (zyrTEC) 10 MG tablet, Take 1 tablet by mouth Every Night., Disp: 90 tablet, Rfl: 1    DULoxetine (CYMBALTA) 60 MG capsule, Take 1 capsule by mouth 2 (Two) Times a Day., Disp:  "180 capsule, Rfl: 1    gabapentin (NEURONTIN) 300 MG capsule, Take 1 capsule by mouth Every Night., Disp: , Rfl:     glucose blood (FREESTYLE LITE) test strip, Use one test strip daily to check blood sugar levels., Disp: 100 each, Rfl: 2    levothyroxine (Synthroid) 50 MCG tablet, Take 1 tablet by mouth Daily., Disp: 90 tablet, Rfl: 1    metFORMIN ER (GLUCOPHAGE-XR) 500 MG 24 hr tablet, Take 1 tablet by mouth Daily With Dinner., Disp: 90 tablet, Rfl: 1    metroNIDAZOLE (METROGEL) 0.75 % gel, As directed, Disp: , Rfl:     mometasone (ELOCON) 0.1 % solution, APPLY TOPICALLY TO RASH TWICE DAILY AS NEEDED., Disp: , Rfl:     multivitamin with minerals tablet tablet, 1 tablet Daily., Disp: , Rfl:     omeprazole (priLOSEC) 20 MG capsule, Take 1 capsule by mouth Daily., Disp: 90 capsule, Rfl: 1    tolterodine LA (Detrol LA) 2 MG 24 hr capsule, Take 1 capsule by mouth Daily., Disp: 90 capsule, Rfl: 1    Lancets (freestyle) lancets, 1 each by Other route 2 (Two) Times a Day., Disp: 100 each, Rfl: 3    rosuvastatin (Crestor) 40 MG tablet, Take 1 tablet by mouth Every Night., Disp: 90 tablet, Rfl: 1    SITagliptin (Januvia) 50 MG tablet, Take 1 tablet by mouth Daily., Disp: 90 tablet, Rfl: 1    vitamin D (ERGOCALCIFEROL) 1.25 MG (11645 UT) capsule capsule, Take 1 capsule by mouth 1 (One) Time Per Week., Disp: 13 capsule, Rfl: 1    Allergies:   Allergies   Allergen Reactions    Cephalexin Hives    Latex Itching    Penicillins Hives, Rash and Itching     Other Reaction(s): Eruption of skin, Unknown, Unknown, Eruption of skin, Unknown, Eruption of skin      PER PT Updated using clean up process. Changed reactant from AMPICILLIN (ingredient) to AMPICILLIN(file - PSNDF(50.6,)           PHQ-2 Total Score: 0   PHQ-9 Total Score: 0     Objective     Physical Exam:  Vital Signs:   Vitals:    05/22/24 1331   BP: 103/70   Pulse: 95   Temp: 96.9 °F (36.1 °C)   SpO2: 95%   Weight: 88 kg (194 lb)   Height: 162.6 cm (64\")     Body mass index " is 33.3 kg/m².           Physical Exam  HENT:      Right Ear: Tympanic membrane normal.      Left Ear: Tympanic membrane normal.      Nose: Nose normal.      Mouth/Throat:      Mouth: Mucous membranes are moist.   Eyes:      Conjunctiva/sclera: Conjunctivae normal.   Neck:      Vascular: No carotid bruit.   Cardiovascular:      Rate and Rhythm: Normal rate and regular rhythm.      Heart sounds: Normal heart sounds. No murmur heard.  Pulmonary:      Effort: Pulmonary effort is normal.      Breath sounds: Normal breath sounds.   Abdominal:      General: Bowel sounds are normal.      Palpations: Abdomen is soft.   Musculoskeletal:      Right lower leg: No edema.      Left lower leg: No edema.   Skin:     General: Skin is warm and dry.   Neurological:      Mental Status: She is alert.   Psychiatric:         Mood and Affect: Mood normal.         Behavior: Behavior normal.           Assessment / Plan      Assessment/Plan:   Diagnoses and all orders for this visit:    1. Type 2 diabetes mellitus without complication, with long-term current use of insulin (Primary)  -     glucose blood (FREESTYLE LITE) test strip; Use one test strip daily to check blood sugar levels.  Dispense: 100 each; Refill: 2    2. Primary hypertension    3. Mixed hyperlipidemia  -     Lipid Panel; Future  -     Comprehensive Metabolic Panel; Future    4. Acquired hypothyroidism    5. Stage 3 chronic kidney disease, unspecified whether stage 3a or 3b CKD    6. Anemia, unspecified type    7. Neuropathy    8. Gout, unspecified cause, unspecified chronicity, unspecified site    9. Seasonal allergic rhinitis, unspecified trigger    10. Gastroesophageal reflux disease without esophagitis  -     omeprazole (priLOSEC) 20 MG capsule; Take 1 capsule by mouth Daily.  Dispense: 90 capsule; Refill: 1  -     Ambulatory Referral to Gastroenterology    11. Vitamin D deficiency    12. Hyperkalemia  -     Comprehensive metabolic panel; Future    13. Screening for  malignant neoplasm of colon  -     Ambulatory Referral to Gastroenterology    Other orders  -     tolterodine LA (Detrol LA) 2 MG 24 hr capsule; Take 1 capsule by mouth Daily.  Dispense: 90 capsule; Refill: 1  -     rosuvastatin (Crestor) 40 MG tablet; Take 1 tablet by mouth Every Night.  Dispense: 90 tablet; Refill: 1  -     allopurinol (ZYLOPRIM) 100 MG tablet; Take 2 tablets by mouth Daily for 180 days.  Dispense: 180 tablet; Refill: 1  -     levothyroxine (Synthroid) 50 MCG tablet; Take 1 tablet by mouth Daily.  Dispense: 90 tablet; Refill: 1  -     metFORMIN ER (GLUCOPHAGE-XR) 500 MG 24 hr tablet; Take 1 tablet by mouth Daily With Dinner.  Dispense: 90 tablet; Refill: 1  -     SITagliptin (Januvia) 50 MG tablet; Take 1 tablet by mouth Daily.  Dispense: 90 tablet; Refill: 1  -     DULoxetine (CYMBALTA) 60 MG capsule; Take 1 capsule by mouth 2 (Two) Times a Day.  Dispense: 180 capsule; Refill: 1  -     cetirizine (zyrTEC) 10 MG tablet; Take 1 tablet by mouth Every Night.  Dispense: 90 tablet; Refill: 1  -     vitamin D (ERGOCALCIFEROL) 1.25 MG (68699 UT) capsule capsule; Take 1 capsule by mouth 1 (One) Time Per Week.  Dispense: 13 capsule; Refill: 1  -     Lancets (freestyle) lancets; 1 each by Other route 2 (Two) Times a Day.  Dispense: 100 each; Refill: 3       Diabetes mellitus type 2 uncontrolled due to side effects we will stop Jardiance start Januvia patient was unable to tolerate Trulicity continue metformin recommend use added carbs sugars exercise 30 minutes daily weight loss patient has lost 7 pounds in the past 6 months  Hyperlipidemia LDL above goal on current statin dose Lipitor 40 mg at nighttime will change to Crestor 40 mg rechecking CMP and lipid panel in 30 days  Hypertension with low blood pressure readings and hyperkalemia we will stop lisinopril recommend to monitor once daily at home we will follow-up in 4 weeks  Hypothyroidism TSH within goal range on current Synthroid dose 50 mcg daily  "denies palpitations  Chronic kidney disease stage III avoid all NSAIDs do not take over-the-counter supplements increase water intake  Anemia hemoglobin hematocrit normal at this time no recommendation for supplementation  Neuropathy resume gabapentin 300 mg at nighttime  Gout uric acid level normal on current allopurinol dose 200 mg daily will provide refills  Seasonal allergies currently controlled with Zyrtec will provide a refill  Vitamin D deficiency was taking OTC supplementation 5000 units daily readings remain low we will stop OTC supplementation start once daily vitamin D dosing  Hyperkalemia increase water intake stop lisinopril stop Jardiance we will reassess in 48 hours  Will provide order for screening colonoscopy and patient is having some difficulty swallowing with reflux reviewed most recent endoscopy in 2020 normal        Follow Up:   Return in about 4 weeks (around 6/19/2024).    Erwin Ascencio, APRN    \"Please note that portions of this note were completed with a voice recognition program.\"    "

## 2024-05-29 ENCOUNTER — LAB (OUTPATIENT)
Dept: LAB | Facility: HOSPITAL | Age: 69
End: 2024-05-29
Payer: MEDICARE

## 2024-05-29 DIAGNOSIS — E87.5 HYPERKALEMIA: ICD-10-CM

## 2024-05-29 DIAGNOSIS — E78.2 MIXED HYPERLIPIDEMIA: ICD-10-CM

## 2024-05-30 ENCOUNTER — HOSPITAL ENCOUNTER (OUTPATIENT)
Dept: GENERAL RADIOLOGY | Facility: HOSPITAL | Age: 69
Discharge: HOME | End: 2024-05-30
Admitting: STUDENT IN AN ORGANIZED HEALTH CARE EDUCATION/TRAINING PROGRAM
Payer: MEDICARE

## 2024-05-30 ENCOUNTER — TRANSCRIBE ORDERS (OUTPATIENT)
Dept: GENERAL RADIOLOGY | Facility: HOSPITAL | Age: 69
End: 2024-05-30
Payer: MEDICARE

## 2024-05-30 DIAGNOSIS — M25.531 PAIN IN RIGHT WRIST: ICD-10-CM

## 2024-05-30 DIAGNOSIS — M25.531 PAIN IN RIGHT WRIST: Primary | ICD-10-CM

## 2024-05-30 PROCEDURE — 73110 X-RAY EXAM OF WRIST: CPT

## 2024-06-07 ENCOUNTER — OFFICE VISIT (OUTPATIENT)
Dept: ORTHOPEDIC SURGERY | Facility: CLINIC | Age: 69
End: 2024-06-07
Payer: MEDICARE

## 2024-06-07 VITALS
SYSTOLIC BLOOD PRESSURE: 120 MMHG | WEIGHT: 195 LBS | OXYGEN SATURATION: 95 % | BODY MASS INDEX: 33.29 KG/M2 | HEART RATE: 89 BPM | DIASTOLIC BLOOD PRESSURE: 81 MMHG | HEIGHT: 64 IN

## 2024-06-07 DIAGNOSIS — S52.501A CLOSED FRACTURE OF DISTAL END OF RIGHT RADIUS, UNSPECIFIED FRACTURE MORPHOLOGY, INITIAL ENCOUNTER: Primary | ICD-10-CM

## 2024-06-07 NOTE — PROGRESS NOTES
"Chief Complaint  Initial Evaluation of the Right Wrist     Subjective      Vickie Bull presents to [facility] for initial evaluation of the right wrist.  She was walking and had a fall.  She has an abrasion on the lateral aspect of her elbow. The injury was 5/29/24. She had X rays on 5/30/24 and is here to review. She has pain with certain movements.  She did brace for a couple of days.     Allergies   Allergen Reactions    Cephalexin Hives    Latex Itching    Penicillins Hives, Rash and Itching     Other Reaction(s): Eruption of skin, Unknown, Unknown, Eruption of skin, Unknown, Eruption of skin      PER PT Updated using clean up process. Changed reactant from AMPICILLIN (ingredient) to AMPICILLIN(file - PSNDF(50.6,)        Social History     Socioeconomic History    Marital status:    Tobacco Use    Smoking status: Never     Passive exposure: Never    Smokeless tobacco: Never    Tobacco comments:     second hand smoke exposure-never   Vaping Use    Vaping status: Never Used   Substance and Sexual Activity    Alcohol use: Yes     Comment: Rarely    Drug use: Never    Sexual activity: Yes     Partners: Male        I reviewed the patient's chief complaint, history of present illness, review of systems, past medical history, surgical history, family history, social history, medications, and allergy list.     Review of Systems     Constitutional: Denies fevers, chills, weight loss  Cardiovascular: Denies chest pain, shortness of breath  Skin: Denies rashes, acute skin changes  Neurologic: Denies headache, loss of consciousness        Vital Signs:   /81   Pulse 89   Ht 162.6 cm (64\")   Wt 88.5 kg (195 lb)   SpO2 95%   BMI 33.47 kg/m²            Ortho Exam    Physical Exam  General:Alert. No acute distress     RIGHT WRIST Mild swelling. Tender to the dorsum of the wrist.  Wrist extension 40 flexion is 50. Sensation grossly intact to light touch, median, radial and " ulnar nerve. Positive AIN, PIN and ulnar nerve motor function intact. Axillary nerve intact. Positive pulses.      Procedures        Imaging Results (Most Recent)       None             Result Review :       XR Wrist 3+ View Right    Result Date: 5/30/2024  Narrative: XR WRIST 3+ VW RIGHT-  Date of Exam: 5/30/2024 10:53 AM  Indication: PAIN IN RIGHT WRIST; M25.531-Pain in right wrist fall 1 day ago.  Comparison: None available.  Findings: There is a fracture of the distal radius along the dorsal intra-articular portion best in lateral view. There is a joint effusion with displacement of the pronator quadratus fat pad. Mild triscaphe the joint space narrowing.      Impression: Impression: Nondisplaced intra-articular fracture dorsal aspect of the distal radial metaphysis. Soft tissue swelling.   Electronically Signed By-Laina Hairston MD On:5/30/2024 1:06 PM              Assessment and Plan     Diagnoses and all orders for this visit:    1. Closed fracture of distal end of right radius, unspecified fracture morphology, initial encounter (Primary)        Discussed the treatment plan with the patient. I reviewed the X-rays that were obtained 5/30/24 with the patient.     Discussed cast vs bracing.     Brace given.  Watch lifting, pushing and pulling of the right wrist.     Call or return if worsening symptoms.    Follow Up     4 weeks with new X ray.     Will obtain X-Rays of the right wrist at next visit.       Patient was given instructions and counseling regarding her condition or for health maintenance advice. Please see specific information pulled into the AVS if appropriate.     Scribed for Prudencio Zelaya MD by Jessica Gerardo MA.  06/07/24   11:16 EDT    I have personally performed the services described in this document as scribed by the above individual and it is both accurate and complete. Prudencio Zelaya MD 06/09/24

## 2024-06-18 ENCOUNTER — LAB (OUTPATIENT)
Dept: LAB | Facility: HOSPITAL | Age: 69
End: 2024-06-18
Payer: MEDICARE

## 2024-06-18 DIAGNOSIS — E87.5 HYPERKALEMIA: ICD-10-CM

## 2024-06-18 DIAGNOSIS — E78.2 MIXED HYPERLIPIDEMIA: ICD-10-CM

## 2024-06-18 LAB
ALBUMIN SERPL-MCNC: 4.1 G/DL (ref 3.5–5.2)
ALBUMIN/GLOB SERPL: 1.5 G/DL
ALP SERPL-CCNC: 141 U/L (ref 39–117)
ALT SERPL W P-5'-P-CCNC: 26 U/L (ref 1–33)
ANION GAP SERPL CALCULATED.3IONS-SCNC: 10.4 MMOL/L (ref 5–15)
AST SERPL-CCNC: 19 U/L (ref 1–32)
BILIRUB SERPL-MCNC: 0.4 MG/DL (ref 0–1.2)
BUN SERPL-MCNC: 22 MG/DL (ref 8–23)
BUN/CREAT SERPL: 11.4 (ref 7–25)
CALCIUM SPEC-SCNC: 9.6 MG/DL (ref 8.6–10.5)
CHLORIDE SERPL-SCNC: 103 MMOL/L (ref 98–107)
CHOLEST SERPL-MCNC: 149 MG/DL (ref 0–200)
CO2 SERPL-SCNC: 24.6 MMOL/L (ref 22–29)
CREAT SERPL-MCNC: 1.93 MG/DL (ref 0.57–1)
EGFRCR SERPLBLD CKD-EPI 2021: 27.8 ML/MIN/1.73
GLOBULIN UR ELPH-MCNC: 2.8 GM/DL
GLUCOSE SERPL-MCNC: 111 MG/DL (ref 65–99)
HDLC SERPL-MCNC: 67 MG/DL (ref 40–60)
LDLC SERPL CALC-MCNC: 60 MG/DL (ref 0–100)
LDLC/HDLC SERPL: 0.85 {RATIO}
POTASSIUM SERPL-SCNC: 4.4 MMOL/L (ref 3.5–5.2)
PROT SERPL-MCNC: 6.9 G/DL (ref 6–8.5)
SODIUM SERPL-SCNC: 138 MMOL/L (ref 136–145)
TRIGL SERPL-MCNC: 125 MG/DL (ref 0–150)
VLDLC SERPL-MCNC: 22 MG/DL (ref 5–40)

## 2024-06-18 PROCEDURE — 36415 COLL VENOUS BLD VENIPUNCTURE: CPT

## 2024-06-18 PROCEDURE — 80053 COMPREHEN METABOLIC PANEL: CPT

## 2024-06-18 PROCEDURE — 80061 LIPID PANEL: CPT

## 2024-06-19 PROBLEM — Z00.00 ENCOUNTER FOR ANNUAL WELLNESS EXAM IN MEDICARE PATIENT: Status: ACTIVE | Noted: 2024-06-19

## 2024-06-19 NOTE — PROGRESS NOTES
The ABCs of the Annual Wellness Visit  Subsequent Medicare Wellness Visit    Subjective    Vickie Bull is a 68 y.o. female who presents for a Subsequent Medicare Wellness Visit.    The following portions of the patient's history were reviewed and   updated as appropriate: allergies, current medications, past family history, past medical history, past social history, past surgical history, and problem list.    Compared to one year ago, the patient feels her physical   health is worse.    Compared to one year ago, the patient feels her mental   health is worse.    Recent Hospitalizations:  She was not admitted to the hospital during the last year.       Current Medical Providers:  Patient Care Team:  Nguyen Ascencio APRN as PCP - General (Nurse Practitioner)    Outpatient Medications Prior to Visit   Medication Sig Dispense Refill    ammonium lactate (AmLactin) 12 % lotion Apply  topically to the appropriate area as directed 2 (Two) Times a Day. 225 g 11    cetirizine (zyrTEC) 10 MG tablet Take 1 tablet by mouth Every Night. 90 tablet 1    DULoxetine (CYMBALTA) 60 MG capsule Take 1 capsule by mouth 2 (Two) Times a Day. 180 capsule 1    gabapentin (NEURONTIN) 300 MG capsule Take 1 capsule by mouth Every Night.      glucose blood (FREESTYLE LITE) test strip Use one test strip daily to check blood sugar levels. 100 each 2    Lancets (freestyle) lancets 1 each by Other route 2 (Two) Times a Day. 100 each 3    levothyroxine (Synthroid) 50 MCG tablet Take 1 tablet by mouth Daily. 90 tablet 1    metroNIDAZOLE (METROGEL) 0.75 % gel As directed      mometasone (ELOCON) 0.1 % solution APPLY TOPICALLY TO RASH TWICE DAILY AS NEEDED.      multivitamin with minerals tablet tablet 1 tablet Daily.      rosuvastatin (Crestor) 40 MG tablet Take 1 tablet by mouth Every Night. 90 tablet 1    allopurinol (ZYLOPRIM) 100 MG tablet Take 2 tablets by mouth Daily for 180 days. 180 tablet 1    metFORMIN ER (GLUCOPHAGE-XR) 500 MG  24 hr tablet Take 1 tablet by mouth Daily With Dinner. 90 tablet 1    omeprazole (priLOSEC) 20 MG capsule Take 1 capsule by mouth Daily. 90 capsule 1    SITagliptin (Januvia) 50 MG tablet Take 1 tablet by mouth Daily. 90 tablet 1    tolterodine LA (Detrol LA) 2 MG 24 hr capsule Take 1 capsule by mouth Daily. 90 capsule 1    vitamin D (ERGOCALCIFEROL) 1.25 MG (60571 UT) capsule capsule Take 1 capsule by mouth 1 (One) Time Per Week. 13 capsule 1     No facility-administered medications prior to visit.       No opioid medication identified on active medication list. I have reviewed chart for other potential  high risk medication/s and harmful drug interactions in the elderly.        Aspirin is not on active medication list.  Aspirin use is not indicated based on review of current medical condition/s. Risk of harm outweighs potential benefits.  .    Patient Active Problem List   Diagnosis    Corns and callosity    Anemia    Diarrhea    Displacement of lumbar intervertebral disc without myelopathy    Heart valve disorder    High cholesterol    Hypertension    History of cardiac catheterization    Type 2 diabetes mellitus, with long-term current use of insulin    Hypothyroidism    Ingrown toenail    Low back pain    Pain in soft tissues of limb    Renal insufficiency    Right ventricular dilation    Sciatica    Seasonal allergic rhinitis    Stomach ulcer    Tachycardia    Ulcerative lesion    Gastroesophageal reflux disease without esophagitis    Neuropathy    Bilateral hip pain    Radiculitis, lumbosacral    Chronic midline thoracic back pain    Hyperkalemia    Degeneration of lumbar intervertebral disc    Lumbar spondylosis    Thoracic radiculopathy    Gout    Medication management    Encounter for Medicare annual wellness exam    Mixed hyperlipidemia    Mixed stress and urge urinary incontinence    Stage 3 chronic kidney disease    Vitamin D deficiency    Dysphagia    Encounter for annual wellness exam in Medicare  "patient     Advance Care Planning   Advance Care Planning     Advance Directive is not on file.  ACP discussion was held with the patient during this visit. Patient has an advance directive (not in EMR), copy requested.     Objective    Vitals:    24 0903   BP: 123/74   Pulse: 81   Temp: 96.6 °F (35.9 °C)   SpO2: 98%   Weight: 86.6 kg (191 lb)   Height: 162.6 cm (64\")     Estimated body mass index is 32.79 kg/m² as calculated from the following:    Height as of this encounter: 162.6 cm (64\").    Weight as of this encounter: 86.6 kg (191 lb).    BMI is >= 30 and <35. (Class 1 Obesity). The following options were offered after discussion;: exercise counseling/recommendations and nutrition counseling/recommendations      Does the patient have evidence of cognitive impairment? No    Lab Results   Component Value Date    TRIG 125 2024    HDL 67 (H) 2024    LDL 60 2024    VLDL 22 2024    HGBA1C 6.90 (H) 2024        HEALTH RISK ASSESSMENT    Smoking Status:  Social History     Tobacco Use   Smoking Status Never    Passive exposure: Never   Smokeless Tobacco Never   Tobacco Comments    second hand smoke exposure-never     Alcohol Consumption:  Social History     Substance and Sexual Activity   Alcohol Use Yes    Comment: Rarely     Fall Risk Screen:    SURINDER Fall Risk Assessment was completed, and patient is at LOW risk for falls.Assessment completed on:2024    Depression Screenin/21/2024     9:01 AM   PHQ-2/PHQ-9 Depression Screening   Little Interest or Pleasure in Doing Things 0-->not at all   Feeling Down, Depressed or Hopeless 0-->not at all   PHQ-9: Brief Depression Severity Measure Score 0       Health Habits and Functional and Cognitive Screenin/21/2024     9:00 AM   Functional & Cognitive Status   Do you have difficulty preparing food and eating? No   Do you have difficulty bathing yourself, getting dressed or grooming yourself? No   Do you have difficulty " using the toilet? No   Do you have difficulty moving around from place to place? No   Do you have trouble with steps or getting out of a bed or a chair? No   Current Diet Well Balanced Diet   Dental Exam Up to date   Eye Exam Up to date   Current Exercises Include Yard Work;Walking   Do you need help using the phone?  No   Are you deaf or do you have serious difficulty hearing?  No   Do you need help to go to places out of walking distance? No   Do you need help shopping? No   Do you need help preparing meals?  No   Do you need help with housework?  No   Do you need help with laundry? No   Do you need help taking your medications? No   Do you need help managing money? No   Do you ever drive or ride in a car without wearing a seat belt? No   Have you felt unusual stress, anger or loneliness in the last month? No   Who do you live with? Spouse   If you need help, do you have trouble finding someone available to you? No   Have you been bothered in the last four weeks by sexual problems? No   Do you have difficulty concentrating, remembering or making decisions? No       Age-appropriate Screening Schedule:  Refer to the list below for future screening recommendations based on patient's age, sex and/or medical conditions. Orders for these recommended tests are listed in the plan section. The patient has been provided with a written plan.    Health Maintenance   Topic Date Due    DIABETIC EYE EXAM  02/21/2024    COLORECTAL CANCER SCREENING  05/09/2024    COVID-19 Vaccine (2 - 2023-24 season) 05/21/2025 (Originally 9/1/2023)    RSV Vaccine - Adults (1 - 1-dose 60+ series) 05/21/2025 (Originally 12/22/2014)    Pneumococcal Vaccine 65+ (1 of 2 - PCV) 05/21/2025 (Originally 12/22/1961)    TDAP/TD VACCINES (1 - Tdap) 05/21/2025 (Originally 12/22/1973)    ZOSTER VACCINE (1 of 2) 05/21/2025 (Originally 12/22/2004)    DIABETIC FOOT EXAM  06/22/2024    INFLUENZA VACCINE  08/01/2024    HEMOGLOBIN A1C  11/20/2024    DXA SCAN   01/11/2025    URINE MICROALBUMIN  05/20/2025    LIPID PANEL  06/18/2025    ANNUAL WELLNESS VISIT  06/21/2025    BMI FOLLOWUP  06/21/2025    MAMMOGRAM  02/23/2026    HEPATITIS C SCREENING  Completed                  CMS Preventative Services Quick Reference  Risk Factors Identified During Encounter  Inactivity/Sedentary: Patient was advised to exercise at least 150 minutes a week per CDC recommendations. and Patient was advised to do at least 150 minutes a week of moderate intensity activity such as brisk walking and do at least 2 days a week of activities that strengthen muscles such as resistance training and do activities to improve balance such as standing on one foot about 3 days a week.  The above risks/problems have been discussed with the patient.  Pertinent information has been shared with the patient in the After Visit Summary.  An After Visit Summary and PPPS were made available to the patient.    Follow Up:   Next Medicare Wellness visit to be scheduled in 1 year.       Additional E&M Note during same encounter follows:  Patient has multiple medical problems which are significant and separately identifiable that require additional work above and beyond the Medicare Wellness Visit.      Chief Complaint  anemia, hyperlipidemia, HTN, DM2, hypothyroidism, GERD.  Review lab results    Follow up change of lipitor to crestor  Also follow-up starting januvia  Unable to tolerate jardiance due to  yeast infections  Unable to tolerate trulicity nausea and vomiting     Pt reports she was drinking multiple mt dews daily regular soda, up to a 2 liter per day  States she stopped 5 days prior     Also complain of constipation    Loss of 7 lbs in the past 5 months with 4 pounds in the last 2 weeks since she stopped drinking regular soda    HPI  Vickie Bull is also being seen today for Dm2, HYPERLIPIDEMIA, CKD, HTN      A1C-6/2024  Eye exam-2022 Dr Fields  Foot exam- Dr Balderrama.  "6/2023  mammogram-2/2024  dexa-1/2023  Pap- 2018 Dr Miller  Colonoscopy-1/2019 normal   EGD 2020        Review of Systems   Constitutional:  Negative for fever.   Respiratory:  Negative for cough.    Cardiovascular:  Negative for chest pain.   Gastrointestinal:  Positive for constipation. Negative for abdominal pain, diarrhea, nausea and vomiting.   Genitourinary:  Negative for dysuria.   Musculoskeletal:  Negative for myalgias.       Objective   Vital Signs:  /74   Pulse 81   Temp 96.6 °F (35.9 °C)   Ht 162.6 cm (64\")   Wt 86.6 kg (191 lb)   SpO2 98%   BMI 32.79 kg/m²     Physical Exam  HENT:      Head: Normocephalic.      Nose: Nose normal.   Cardiovascular:      Rate and Rhythm: Normal rate and regular rhythm.      Heart sounds: Normal heart sounds. No murmur heard.  Pulmonary:      Effort: Pulmonary effort is normal.      Breath sounds: Normal breath sounds.   Abdominal:      General: Bowel sounds are normal.      Palpations: Abdomen is soft.   Musculoskeletal:      Right lower leg: No edema.      Left lower leg: No edema.   Skin:     General: Skin is warm and dry.   Neurological:      Mental Status: She is alert and oriented to person, place, and time.   Psychiatric:         Mood and Affect: Mood normal.         Behavior: Behavior normal.                     Assessment and Plan   Diagnoses and all orders for this visit:    1. Encounter for annual wellness exam in Medicare patient (Primary)    2. Type 2 diabetes mellitus without complication, without long-term current use of insulin  -     CBC Auto Differential; Future  -     Comprehensive Metabolic Panel; Future  -     Microalbumin / Creatinine Urine Ratio - Urine, Clean Catch; Future  -     Hemoglobin A1c; Future  -     Urinalysis With Culture If Indicated -; Future  -     Comprehensive Metabolic Panel; Future  -     CBC Auto Differential; Future  -     Hemoglobin A1c; Future  -     Microalbumin / Creatinine Urine Ratio - Urine, Clean Catch; " Future  -     Urinalysis With Culture If Indicated -; Future  -     Ambulatory Referral for Diabetic Eye Exam-Ophthalmology    3. Primary hypertension    4. Mixed hyperlipidemia  -     Comprehensive Metabolic Panel; Future  -     Lipid Panel; Future  -     Comprehensive Metabolic Panel; Future  -     Lipid Panel; Future    5. Acquired hypothyroidism  -     TSH; Future  -     TSH; Future    6. Stage 3 chronic kidney disease, unspecified whether stage 3a or 3b CKD  -     Comprehensive Metabolic Panel; Future  -     Comprehensive metabolic panel; Future  -     Comprehensive Metabolic Panel; Future    7. Vitamin D deficiency  -     Vitamin D,25-Hydroxy; Future    8. Hyperkalemia    9. Gastroesophageal reflux disease without esophagitis    10. Constipation, unspecified constipation type    Other orders  -     allopurinol (ZYLOPRIM) 100 MG tablet; Take 2 tablets by mouth Daily for 180 days.  Dispense: 180 tablet; Refill: 1  -     tolterodine LA (Detrol LA) 2 MG 24 hr capsule; Take 1 capsule by mouth Daily.  Dispense: 90 capsule; Refill: 1  -     vitamin D (ERGOCALCIFEROL) 1.25 MG (54646 UT) capsule capsule; Take 1 capsule by mouth 1 (One) Time Per Week.  Dispense: 13 capsule; Refill: 1  -     SITagliptin (Januvia) 25 MG tablet; Take 1 tablet by mouth Daily.  Dispense: 90 tablet; Refill: 1  -     famotidine (Pepcid) 20 MG tablet; Take 1 tablet by mouth 2 (Two) Times a Day.  Dispense: 180 tablet; Refill: 1  -     docusate sodium (Colace) 100 MG capsule; Take 1 capsule by mouth 2 (Two) Times a Day.  Dispense: 180 capsule; Refill: 1    encounter for annual Medicare wellness exam immunizations screening reviewed with patient  Diabetes mellitus type 2 stop added sugars such as sodas increase exercise to 30 minutes daily reducing overall caloric intake for weight loss  CKD stop metformin, stop PPI, decrease januvia to 25 mg once daily AVOID NSAID, NO SODAS follow-up in 4 weeks to monitor renal function also patient instructed to  check blood sugar fasting and 2 hours after a meal and bring glucose log to next office visit  Hyperlipidemia LDL below goal on current statin dose Crestor 40 mg at nighttime liver enzymes normal denies myalgias  Hypothyroidism TSH 1 goal range on current Synthroid dose 50 mcg daily denies palpitations  Hyperkalemia has resolved  Reflux will stop omeprazole add Pepcid as needed do recommend reducing acid in diet such as sodas  Constipation will add stool softeners do recommend increase water and fiber in diet exercise 30 minutes daily       Follow Up   Return in about 4 weeks (around 7/19/2024).  Patient was given instructions and counseling regarding her condition or for health maintenance advice. Please see specific information pulled into the AVS if appropriate.

## 2024-06-21 ENCOUNTER — OFFICE VISIT (OUTPATIENT)
Dept: FAMILY MEDICINE CLINIC | Facility: CLINIC | Age: 69
End: 2024-06-21
Payer: MEDICARE

## 2024-06-21 VITALS
SYSTOLIC BLOOD PRESSURE: 123 MMHG | OXYGEN SATURATION: 98 % | TEMPERATURE: 96.6 F | BODY MASS INDEX: 32.61 KG/M2 | WEIGHT: 191 LBS | HEIGHT: 64 IN | HEART RATE: 81 BPM | DIASTOLIC BLOOD PRESSURE: 74 MMHG

## 2024-06-21 DIAGNOSIS — E78.2 MIXED HYPERLIPIDEMIA: ICD-10-CM

## 2024-06-21 DIAGNOSIS — E55.9 VITAMIN D DEFICIENCY: ICD-10-CM

## 2024-06-21 DIAGNOSIS — K21.9 GASTROESOPHAGEAL REFLUX DISEASE WITHOUT ESOPHAGITIS: ICD-10-CM

## 2024-06-21 DIAGNOSIS — I10 PRIMARY HYPERTENSION: ICD-10-CM

## 2024-06-21 DIAGNOSIS — K59.00 CONSTIPATION, UNSPECIFIED CONSTIPATION TYPE: ICD-10-CM

## 2024-06-21 DIAGNOSIS — E03.9 ACQUIRED HYPOTHYROIDISM: ICD-10-CM

## 2024-06-21 DIAGNOSIS — N18.30 STAGE 3 CHRONIC KIDNEY DISEASE, UNSPECIFIED WHETHER STAGE 3A OR 3B CKD: ICD-10-CM

## 2024-06-21 DIAGNOSIS — E87.5 HYPERKALEMIA: ICD-10-CM

## 2024-06-21 DIAGNOSIS — Z00.00 ENCOUNTER FOR ANNUAL WELLNESS EXAM IN MEDICARE PATIENT: Primary | ICD-10-CM

## 2024-06-21 DIAGNOSIS — E11.9 TYPE 2 DIABETES MELLITUS WITHOUT COMPLICATION, WITHOUT LONG-TERM CURRENT USE OF INSULIN: ICD-10-CM

## 2024-06-21 RX ORDER — ERGOCALCIFEROL 1.25 MG/1
50000 CAPSULE ORAL WEEKLY
Qty: 13 CAPSULE | Refills: 1 | Status: SHIPPED | OUTPATIENT
Start: 2024-06-21

## 2024-06-21 RX ORDER — LEVOTHYROXINE SODIUM 0.05 MG/1
50 TABLET ORAL DAILY
Qty: 90 TABLET | Refills: 1 | Status: CANCELLED | OUTPATIENT
Start: 2024-06-21

## 2024-06-21 RX ORDER — TOLTERODINE 2 MG/1
2 CAPSULE, EXTENDED RELEASE ORAL DAILY
Qty: 90 CAPSULE | Refills: 1 | Status: SHIPPED | OUTPATIENT
Start: 2024-06-21

## 2024-06-21 RX ORDER — ALLOPURINOL 100 MG/1
200 TABLET ORAL DAILY
Qty: 180 TABLET | Refills: 1 | Status: SHIPPED | OUTPATIENT
Start: 2024-06-21 | End: 2024-12-18

## 2024-06-21 RX ORDER — CETIRIZINE HYDROCHLORIDE 10 MG/1
10 TABLET ORAL NIGHTLY
Qty: 90 TABLET | Refills: 1 | Status: CANCELLED | OUTPATIENT
Start: 2024-06-21

## 2024-06-21 RX ORDER — METFORMIN HYDROCHLORIDE 500 MG/1
500 TABLET, EXTENDED RELEASE ORAL
Qty: 90 TABLET | Refills: 1 | Status: CANCELLED | OUTPATIENT
Start: 2024-06-21

## 2024-06-21 RX ORDER — FAMOTIDINE 20 MG/1
20 TABLET, FILM COATED ORAL 2 TIMES DAILY
Qty: 180 TABLET | Refills: 1 | Status: SHIPPED | OUTPATIENT
Start: 2024-06-21

## 2024-06-21 RX ORDER — OMEPRAZOLE 20 MG/1
20 CAPSULE, DELAYED RELEASE ORAL DAILY
Qty: 90 CAPSULE | Refills: 1 | Status: CANCELLED | OUTPATIENT
Start: 2024-06-21

## 2024-06-21 RX ORDER — DULOXETIN HYDROCHLORIDE 60 MG/1
60 CAPSULE, DELAYED RELEASE ORAL 2 TIMES DAILY
Qty: 180 CAPSULE | Refills: 1 | Status: CANCELLED | OUTPATIENT
Start: 2024-06-21

## 2024-06-21 RX ORDER — DOCUSATE SODIUM 100 MG/1
100 CAPSULE, LIQUID FILLED ORAL 2 TIMES DAILY
Qty: 180 CAPSULE | Refills: 1 | Status: SHIPPED | OUTPATIENT
Start: 2024-06-21

## 2024-06-21 RX ORDER — ROSUVASTATIN CALCIUM 40 MG/1
40 TABLET, COATED ORAL NIGHTLY
Qty: 90 TABLET | Refills: 1 | Status: CANCELLED | OUTPATIENT
Start: 2024-06-21

## 2024-06-24 ENCOUNTER — OFFICE VISIT (OUTPATIENT)
Dept: PODIATRY | Facility: CLINIC | Age: 69
End: 2024-06-24
Payer: MEDICARE

## 2024-06-24 VITALS
OXYGEN SATURATION: 98 % | HEIGHT: 64 IN | DIASTOLIC BLOOD PRESSURE: 61 MMHG | WEIGHT: 192 LBS | TEMPERATURE: 96.1 F | SYSTOLIC BLOOD PRESSURE: 138 MMHG | BODY MASS INDEX: 32.78 KG/M2 | HEART RATE: 99 BPM

## 2024-06-24 DIAGNOSIS — E11.42 TYPE 2 DIABETES MELLITUS WITH POLYNEUROPATHY: Primary | ICD-10-CM

## 2024-06-24 DIAGNOSIS — G62.9 NEUROPATHY: ICD-10-CM

## 2024-06-24 PROCEDURE — 1159F MED LIST DOCD IN RCRD: CPT | Performed by: PODIATRIST

## 2024-06-24 PROCEDURE — G8404 LOW EXTEMITY NEUR EXAM DOCUM: HCPCS | Performed by: PODIATRIST

## 2024-06-24 PROCEDURE — 3078F DIAST BP <80 MM HG: CPT | Performed by: PODIATRIST

## 2024-06-24 PROCEDURE — 99213 OFFICE O/P EST LOW 20 MIN: CPT | Performed by: PODIATRIST

## 2024-06-24 PROCEDURE — 3075F SYST BP GE 130 - 139MM HG: CPT | Performed by: PODIATRIST

## 2024-06-24 PROCEDURE — 1160F RVW MEDS BY RX/DR IN RCRD: CPT | Performed by: PODIATRIST

## 2024-06-24 NOTE — PROGRESS NOTES
[facility] - PODIATRY    Today's Date: 06/24/24    Patient Name: Vickie Bull  MRN: [id number]  CSN: [id number]  PCP: Nguyen Ascencio APRN, Last PCP Visit: 21 June 2024  Referring Provider: No ref. provider found    SUBJECTIVE     Chief Complaint   Patient presents with    Left Foot - Diabetes, Follow-up    Right Foot - Follow-up, Diabetes     HPI: Vickie Bull, a 68 y.o.female, presents to clinic for a diabetic foot evaluation.    New, Established, New Problem:  est    Onset: Insidious    Nature:  NIDDM    Stable, worsening, improving: Stable    Patient controlling diabetes via:  oral meds    Patient states they are unsure of the most recent blood glucose reading.      Patient denies any fevers, chills, nausea, vomiting, shortness of breath, nor any other constitutional signs nor symptoms.    Numbness in feet.    Medical changes:  changes in DM meds.    Past Medical History:   Diagnosis Date    Acid reflux     Anemia, unspecified     Cancer August 2022    Skin cancer on face    Chronic allergic rhinitis     Condition not found     Ulcer    Corns and callus     Diabetes     Unspecified    Diarrhea 04/15/2014    GERD (gastroesophageal reflux disease)     Gout 02/13/2023    Possible gout, not sure    HBP (high blood pressure)     High cholesterol     HTN (hypertension)     Hyperlipemia     Hypothyroidism     Ingrown toenail     Leg pain     Lumbar disc herniation 11/22/2013    L3-4 very small fat lat disc    Lumbar pain     Neuropathy in diabetes 2021    Numbness in feet     Renal insufficiency 03/16/2021    Sciatica 11/22/2013    no sig structural compressive lesion    Seasonal allergies     Stomach ulcer     Thyroid disorder      Past Surgical History:   Procedure Laterality Date    COLONOSCOPY      ENDOSCOPY      JOINT REPLACEMENT      SHOULDER ACROMIOCLAVICULAR JOINT REPAIR      SHOULDER SURGERY Right 10/2010    TUBAL ABDOMINAL LIGATION       Family History   Problem Relation  Age of Onset    Cancer Mother         unspecified    Lung cancer Mother     Diabetes Father         unspecified type    Prostate cancer Father     Diabetes Paternal Grandmother         unspecified type    Heart attack Other     Diabetes Other     Diabetes Paternal Aunt         unspecified type    Diabetes Paternal Uncle         unspecified type     Social History     Socioeconomic History    Marital status:    Tobacco Use    Smoking status: Never     Passive exposure: Never    Smokeless tobacco: Never    Tobacco comments:     second hand smoke exposure-never   Vaping Use    Vaping status: Never Used   Substance and Sexual Activity    Alcohol use: Yes     Comment: Rarely    Drug use: Never    Sexual activity: Yes     Partners: Male     Allergies   Allergen Reactions    Cephalexin Hives    Latex Itching    Penicillins Hives, Rash and Itching     Other Reaction(s): Eruption of skin, Unknown, Unknown, Eruption of skin, Unknown, Eruption of skin      PER PT Updated using clean up process. Changed reactant from AMPICILLIN (ingredient) to AMPICILLIN(file - PSNDF(50.6,)     Current Outpatient Medications   Medication Sig Dispense Refill    allopurinol (ZYLOPRIM) 100 MG tablet Take 2 tablets by mouth Daily for 180 days. 180 tablet 1    ammonium lactate (AmLactin) 12 % lotion Apply  topically to the appropriate area as directed 2 (Two) Times a Day. 225 g 11    cetirizine (zyrTEC) 10 MG tablet Take 1 tablet by mouth Every Night. 90 tablet 1    docusate sodium (Colace) 100 MG capsule Take 1 capsule by mouth 2 (Two) Times a Day. 180 capsule 1    DULoxetine (CYMBALTA) 60 MG capsule Take 1 capsule by mouth 2 (Two) Times a Day. 180 capsule 1    famotidine (Pepcid) 20 MG tablet Take 1 tablet by mouth 2 (Two) Times a Day. 180 tablet 1    gabapentin (NEURONTIN) 300 MG capsule Take 1 capsule by mouth Every Night.      glucose blood (FREESTYLE LITE) test strip Use one test strip daily to check blood sugar levels. 100 each 2     Lancets (freestyle) lancets 1 each by Other route 2 (Two) Times a Day. 100 each 3    levothyroxine (Synthroid) 50 MCG tablet Take 1 tablet by mouth Daily. 90 tablet 1    metroNIDAZOLE (METROGEL) 0.75 % gel As directed      mometasone (ELOCON) 0.1 % solution APPLY TOPICALLY TO RASH TWICE DAILY AS NEEDED.      multivitamin with minerals tablet tablet 1 tablet Daily.      rosuvastatin (Crestor) 40 MG tablet Take 1 tablet by mouth Every Night. 90 tablet 1    SITagliptin (Januvia) 25 MG tablet Take 1 tablet by mouth Daily. 90 tablet 1    tolterodine LA (Detrol LA) 2 MG 24 hr capsule Take 1 capsule by mouth Daily. 90 capsule 1    vitamin D (ERGOCALCIFEROL) 1.25 MG (00395 UT) capsule capsule Take 1 capsule by mouth 1 (One) Time Per Week. 13 capsule 1     No current facility-administered medications for this visit.     Review of Systems   Constitutional: Negative.    Neurological:  Positive for numbness.   All other systems reviewed and are negative.      OBJECTIVE     Vitals:    06/24/24 1258   BP: 138/61   Pulse: 99   Temp: 96.1 °F (35.6 °C)   SpO2: 98%       Body mass index is 32.94 kg/m².    Lab Results   Component Value Date    HGBA1C 6.90 (H) 05/20/2024       Lab Results   Component Value Date    GLUCOSE 111 (H) 06/18/2024    CALCIUM 9.6 06/18/2024     06/18/2024    K 4.4 06/18/2024    CO2 24.6 06/18/2024     06/18/2024    BUN 22 06/18/2024    CREATININE 1.93 (H) 06/18/2024    EGFRIFNONA 36 (L) 09/10/2021    BCR 11.4 06/18/2024    ANIONGAP 10.4 06/18/2024       Patient seen in no apparent distress.      PHYSICAL EXAM:     Foot/Ankle Exam    GENERAL  Diabetic foot exam performed    Appearance:  appears stated age, elderly and healthy  Orientation:  AAOx3  Affect:  appropriate  Gait:  unimpaired  Assistance:  independent  Right shoe gear: sandal  Left shoe gear: sandal    VASCULAR     Right Foot Vascularity   Normal vascular exam    Dorsalis pedis:  2+  Posterior tibial:  2+  Skin temperature:  warm  Edema  grading:  None  CFT:  < 3 seconds  Pedal hair growth:  Present  Varicosities:  none     Left Foot Vascularity   Normal vascular exam    Dorsalis pedis:  2+  Posterior tibial:  2+  Skin temperature:  warm  Edema grading:  None  CFT:  < 3 seconds  Pedal hair growth:  Present  Varicosities:  none     NEUROLOGIC     Right Foot Neurologic   Light touch sensation: diminished  Vibratory sensation: diminished  Hot/Cold sensation: diminished  Protective Sensation using Prescott-Annette Monofilament:   Sites intact: 5  Sites tested: 10     Left Foot Neurologic   Light touch sensation: diminished  Vibratory sensation: diminished  Hot/Cold sensation:  diminished  Protective Sensation using Prescott-Annette Monofilament:   Sites intact: 5  Sites tested: 10    MUSCLE STRENGTH     Right Foot Muscle Strength   Foot dorsiflexion:  4  Foot plantar flexion:  4  Foot inversion:  4  Foot eversion:  4     Left Foot Muscle Strength   Foot dorsiflexion:  4  Foot plantar flexion:  4  Foot inversion:  4  Foot eversion:  4    RANGE OF MOTION     Right Foot Range of Motion   Foot and ankle ROM within normal limits       Left Foot Range of Motion   Foot and ankle ROM within normal limits      DERMATOLOGIC      Right Foot Dermatologic   Skin  Right foot skin is intact.      Left Foot Dermatologic   Skin  Left foot skin is intact.     Diabetic Foot Exam Performed and Monofilament Test Performed    I have reexamined the patient the results are consistent with the previously documented exam.    ASSESSMENT/PLAN     Diagnoses and all orders for this visit:    1. Type 2 diabetes mellitus with polyneuropathy (Primary)    2. Neuropathy    Rx:  DM Shoes    Comprehensive lower extremity examination and evaluation was performed.    Discussed findings and treatment plan including risks, benefits, and treatment options with patient in detail. Patient agreed with treatment plan.    Medications and allergies reviewed.  Reviewed available blood glucose and HgB  A1C lab values along with other pertinent labs.  These were discussed with the patient as to their importance of diabetic maintenance.    Diabetic foot exam performed and documented this date, compliant with CQM required standards. Detail of findings as noted in physical exam.  Lower extremity Neurologic exam for diabetic patient performed and documented this date, compliant with PQRS required standards. Detail of findings as noted in physical exam.  Advised patient importance of good routine lower extremity hygiene. Advised patient importance of evaluating for intact skin and pain free nail borders.  Advised patient to use mirror to evaluate plantar/ soles of feet for better visualization. Advised patient monitor and phone office to be seen if any cracking to skin, open lesions, painful nail borders or if nails become elongated prior to next visit. Advised patient importance of daily cleansing of lower extremities, followed by good skin cream to maintain normal hydration of skin. Also advised patient importance of close daily monitoring of blood sugar. Advised to regulate diet and medications to maintain control of blood sugar in optimal range. Contact primary care provider if difficulties maintaining blood sugar levels.  Advised Patient of presence of Diabetes Mellitus condition.  Advised Patient risk of progression and worsening or improvement, then return of condition.  Will monitor condition for any change in future. Treat with most appropriate treatment pending status of condition.  Counseled and advised patient extensively on nature and ramifications of diabetes. Standard instructions given to patient for good diabetic foot care and maintenance. Advised importance of careful monitoring to avoid break down and complications secondary to diabetes. Advised patient importance of strict maintenance of blood sugar control. Advised patient of possible ominous results from neglect of condition, i.e.: amputation/ loss  of digits, feet and legs, or even death.  Patient states understands counseling, will monitor closely, continue good hygiene and routine diabetic foot care. Patient will contact office is questions or problems.      An After Visit Summary was printed and given to the patient at discharge, including (if requested) any available informative/educational handouts regarding diagnosis, treatment, or medications. All questions were answered to patient/family satisfaction. Should symptoms fail to improve or worsen they agree to call or return to clinic or to go to the Emergency Department. Discussed the importance of following up with any needed screening tests/labs/specialist appointments and any requested follow-up recommended by me today. Importance of maintaining follow-up discussed and patient accepts that missed appointments can delay diagnosis and potentially lead to worsening of conditions.    Return in about 1 year (around 6/24/2025) for DFE., or sooner if acute issues arise.  I have reviewed the assessment and plan and verified the accuracy of it. No changes to assessment and plan since the information was documented. Eric Ibanez DPM 06/24/24     I have dictated this note utilizing Dragon Dictation.  Please note that portions of this note were completed with a voice recognition program.  Part of this note may be an electronic transcription/translation of spoken language to printed text using the Dragon Dictation System.          This document has been electronically signed by Eric Ibanez DPM on June 24, 2024 13:19 EDT

## 2024-07-05 ENCOUNTER — TELEPHONE (OUTPATIENT)
Dept: GASTROENTEROLOGY | Facility: CLINIC | Age: 69
End: 2024-07-05
Payer: MEDICARE

## 2024-07-05 NOTE — TELEPHONE ENCOUNTER
Hub staff attempted to follow warm transfer process and was unsuccessful     Caller: Vickie Bull    Relationship to patient: Self    Best call back number:  [phone]    Patient is needing:  PT IS CALLING TO RESCHEDULE HER NURSE COLON SCREENING VISIT, PLEASE CALL TO SCHEDULE.

## 2024-07-11 ENCOUNTER — OFFICE VISIT (OUTPATIENT)
Dept: ORTHOPEDIC SURGERY | Facility: CLINIC | Age: 69
End: 2024-07-11
Payer: MEDICARE

## 2024-07-11 VITALS
HEART RATE: 86 BPM | SYSTOLIC BLOOD PRESSURE: 117 MMHG | OXYGEN SATURATION: 93 % | BODY MASS INDEX: 32.78 KG/M2 | HEIGHT: 64 IN | WEIGHT: 192 LBS | DIASTOLIC BLOOD PRESSURE: 78 MMHG

## 2024-07-11 DIAGNOSIS — S52.501D CLOSED FRACTURE OF DISTAL END OF RIGHT RADIUS WITH ROUTINE HEALING, UNSPECIFIED FRACTURE MORPHOLOGY, SUBSEQUENT ENCOUNTER: Primary | ICD-10-CM

## 2024-07-11 NOTE — PROGRESS NOTES
"Chief Complaint  Follow-up of the Right Wrist    Subjective          History of Present Illness      Vickie Bull is a 68 y.o. female  presents to [facility] for     Patient presents for follow-up evaluation of right wrist fracture.  Patient states that she has \"lots of improvement \"to the wrist.  She states she finished wearing the brace and states that only lifting heavy objects or twisting pulling objects might cause some pain otherwise most activities of daily living have become normal and pain-free, no new complaints today.    Allergies   Allergen Reactions    Cephalexin Hives    Latex Itching    Penicillins Hives, Rash and Itching     Other Reaction(s): Eruption of skin, Unknown, Unknown, Eruption of skin, Unknown, Eruption of skin      PER PT Updated using clean up process. Changed reactant from AMPICILLIN (ingredient) to AMPICILLIN(file - PSNDF(50.6,)        Social History     Socioeconomic History    Marital status:    Tobacco Use    Smoking status: Never     Passive exposure: Never    Smokeless tobacco: Never    Tobacco comments:     second hand smoke exposure-never   Vaping Use    Vaping status: Never Used   Substance and Sexual Activity    Alcohol use: Yes     Comment: Rarely    Drug use: Never    Sexual activity: Yes     Partners: Male        REVIEW OF SYSTEMS    Constitutional: Awake alert and oriented x3, no acute distress, denies fevers, chills, weight loss  Respiratory: No respiratory distress  Vascular: Brisk cap refill, Intact distal pulses, No cyanosis, compartments soft with no signs or symptoms of compartment syndrome or DVT.   Cardiovascular: Denies chest pain, shortness of breath  Skin: Denies rashes, acute skin changes  Neurologic: Denies headache, loss of consciousness  MSK: Right wrist pain      Objective   Vital Signs:   /78   Pulse 86   Ht 162.6 cm (64.02\")   Wt 87.1 kg (192 lb)   SpO2 93%   BMI 32.94 kg/m²     Body mass index is 32.94 " kg/m².    Physical Exam       Right wrist: Nontender to palpation no pain with range of motion full extension full flexion, full ulnar and radial deviation, 5 out of 5  strength, full finger and thumb range of motion with flexion extension abduction/adduction, neurovascularly intact      Procedures    Imaging Results (Most Recent)       Procedure Component Value Units Date/Time    XR Wrist 2 View Right [[id number]] Resulted: 07/11/24 1728     Updated: 07/11/24 1728    Narrative:      View:AP/Lateral view(s)  Site: Right wrist  Indication: Right wrist pain  Study: X-rays ordered, taken in the office, and reviewed today  X-ray: Good healing of nondisplaced intra-articular fracture of the dorsal   aspect of the distal radial metaphysis, no increased displacement or   angulation compared to previous studies  Comparative data: Previous studies             Result Review :   The following data was reviewed by: JOHNATHAN Lopez on 07/11/2024:  Data reviewed : Radiologic studies reviewed by me with the patient              Assessment and Plan    Diagnoses and all orders for this visit:    1. Closed fracture of distal end of right radius with routine healing, unspecified fracture morphology, subsequent encounter (Primary)  -     XR Wrist 2 View Right        Reviewed x-rays with the patient discussed diagnosis and treatment options with her she was advised we recommend continued conservative treatment, activity as tolerated follow-up as needed per patient request    Call or return if worsening symptoms.    Follow Up   Return if symptoms worsen or fail to improve.  Patient was given instructions and counseling regarding her condition or for health maintenance advice. Please see specific information pulled into the AVS if appropriate.       EMR Dragon/Transcription disclaimer:  Part of this note may be an electronic transcription/translation of spoken language to printed text using the Dragon Dictation System

## 2024-07-17 ENCOUNTER — LAB (OUTPATIENT)
Dept: LAB | Facility: HOSPITAL | Age: 69
End: 2024-07-17
Payer: MEDICARE

## 2024-07-17 DIAGNOSIS — N18.30 STAGE 3 CHRONIC KIDNEY DISEASE, UNSPECIFIED WHETHER STAGE 3A OR 3B CKD: ICD-10-CM

## 2024-07-17 LAB
ALBUMIN SERPL-MCNC: 4.1 G/DL (ref 3.5–5.2)
ALBUMIN/GLOB SERPL: 1.6 G/DL
ALP SERPL-CCNC: 107 U/L (ref 39–117)
ALT SERPL W P-5'-P-CCNC: 17 U/L (ref 1–33)
ANION GAP SERPL CALCULATED.3IONS-SCNC: 7 MMOL/L (ref 5–15)
AST SERPL-CCNC: 21 U/L (ref 1–32)
BILIRUB SERPL-MCNC: 0.4 MG/DL (ref 0–1.2)
BUN SERPL-MCNC: 16 MG/DL (ref 8–23)
BUN/CREAT SERPL: 12.1 (ref 7–25)
CALCIUM SPEC-SCNC: 9.6 MG/DL (ref 8.6–10.5)
CHLORIDE SERPL-SCNC: 103 MMOL/L (ref 98–107)
CO2 SERPL-SCNC: 29 MMOL/L (ref 22–29)
CREAT SERPL-MCNC: 1.32 MG/DL (ref 0.57–1)
EGFRCR SERPLBLD CKD-EPI 2021: 43.8 ML/MIN/1.73
GLOBULIN UR ELPH-MCNC: 2.6 GM/DL
GLUCOSE SERPL-MCNC: 122 MG/DL (ref 65–99)
POTASSIUM SERPL-SCNC: 5.1 MMOL/L (ref 3.5–5.2)
PROT SERPL-MCNC: 6.7 G/DL (ref 6–8.5)
SODIUM SERPL-SCNC: 139 MMOL/L (ref 136–145)

## 2024-07-17 PROCEDURE — 80053 COMPREHEN METABOLIC PANEL: CPT

## 2024-07-17 PROCEDURE — 36415 COLL VENOUS BLD VENIPUNCTURE: CPT

## 2024-07-18 NOTE — PROGRESS NOTES
Follow Up Office Visit      Patient Name: Vickie Bull  : 1955   MRN: [id number]     Chief Complaint:    Chief Complaint   Patient presents with    Chronic Kidney Disease    Hypertension    Diabetes    Hyperlipidemia    Heartburn       History of Present Illness: Vickie Bull is a 68 y.o. female who is here today to follow up for DM2, CKD hyperlipidemia hypertension reflux  Review lab results        Follow up CKD stop metformin, stop PPI, decrease januvia to 25 mg once daily   Reflux will stop omeprazole add Pepcid as needed do recommend reducing acid in diet such as sodas  Constipation will add stool softeners do recommend increase water and fiber in diet exercise 30 minutes daily    Patient glucose fasting 1 10-1 77 patient reports 1 is 177 she did eat fruit for dinner and struggling with her appetite  States she did cut out all sodas and is trying to drink more water  Patient wants reflux is controlled with Pepcid and avoiding all sodas at this time    Patient reports bowel movements are improving with Colace still struggling some      Subjective      Review of Systems:   Review of Systems   Constitutional:  Negative for fever.   Respiratory:  Negative for cough and shortness of breath.    Cardiovascular:  Negative for chest pain.   Gastrointestinal:  Negative for abdominal pain, constipation, diarrhea, nausea and vomiting.   Genitourinary:  Negative for dysuria.        Past Medical History:   Past Medical History:   Diagnosis Date    Acid reflux     Anemia, unspecified     Cancer 2022    Skin cancer on face    Chronic allergic rhinitis     Condition not found     Ulcer    Corns and callus     Diabetes     Unspecified    Diarrhea 04/15/2014    GERD (gastroesophageal reflux disease)     Gout 2023    Possible gout, not sure    HBP (high blood pressure)     High cholesterol     HTN (hypertension)     Hyperlipemia     Hypothyroidism     Ingrown toenail     Leg pain     Lumbar disc  herniation 11/22/2013    L3-4 very small fat lat disc    Lumbar pain     Neuropathy in diabetes 2021    Numbness in feet     Renal insufficiency 03/16/2021    Sciatica 11/22/2013    no sig structural compressive lesion    Seasonal allergies     Stomach ulcer     Thyroid disorder        Past Surgical History:   Past Surgical History:   Procedure Laterality Date    COLONOSCOPY      ENDOSCOPY      JOINT REPLACEMENT      SHOULDER ACROMIOCLAVICULAR JOINT REPAIR      SHOULDER SURGERY Right 10/2010    TUBAL ABDOMINAL LIGATION         Family History:   Family History   Problem Relation Age of Onset    Cancer Mother         unspecified    Lung cancer Mother     Diabetes Father         unspecified type    Prostate cancer Father     Diabetes Paternal Grandmother         unspecified type    Heart attack Other     Diabetes Other     Diabetes Paternal Aunt         unspecified type    Diabetes Paternal Uncle         unspecified type       Social History:   Social History     Socioeconomic History    Marital status:    Tobacco Use    Smoking status: Never     Passive exposure: Never    Smokeless tobacco: Never    Tobacco comments:     second hand smoke exposure-never   Vaping Use    Vaping status: Never Used   Substance and Sexual Activity    Alcohol use: Yes     Comment: Rarely    Drug use: Never    Sexual activity: Yes     Partners: Male       Medications:     Current Outpatient Medications:     allopurinol (ZYLOPRIM) 100 MG tablet, Take 2 tablets by mouth Daily for 180 days., Disp: 180 tablet, Rfl: 1    ammonium lactate (AmLactin) 12 % lotion, Apply  topically to the appropriate area as directed 2 (Two) Times a Day., Disp: 225 g, Rfl: 11    cetirizine (zyrTEC) 10 MG tablet, Take 1 tablet by mouth Every Night., Disp: 90 tablet, Rfl: 1    docusate sodium (Colace) 100 MG capsule, Take 1 capsule by mouth 2 (Two) Times a Day., Disp: 180 capsule, Rfl: 1    DULoxetine (CYMBALTA) 60 MG capsule, Take 1 capsule by mouth 2 (Two)  "Times a Day., Disp: 180 capsule, Rfl: 1    famotidine (Pepcid) 20 MG tablet, Take 1 tablet by mouth 2 (Two) Times a Day., Disp: 180 tablet, Rfl: 1    gabapentin (NEURONTIN) 300 MG capsule, Take 1 capsule by mouth Every Night., Disp: , Rfl:     glucose blood (FREESTYLE LITE) test strip, Use one test strip daily to check blood sugar levels., Disp: 100 each, Rfl: 2    Lancets (freestyle) lancets, 1 each by Other route 2 (Two) Times a Day., Disp: 100 each, Rfl: 3    levothyroxine (Synthroid) 50 MCG tablet, Take 1 tablet by mouth Daily., Disp: 90 tablet, Rfl: 1    metroNIDAZOLE (METROGEL) 0.75 % gel, As directed, Disp: , Rfl:     mometasone (ELOCON) 0.1 % solution, APPLY TOPICALLY TO RASH TWICE DAILY AS NEEDED., Disp: , Rfl:     multivitamin with minerals tablet tablet, 1 tablet Daily., Disp: , Rfl:     rosuvastatin (Crestor) 40 MG tablet, Take 1 tablet by mouth Every Night., Disp: 90 tablet, Rfl: 1    SITagliptin (Januvia) 25 MG tablet, Take 1 tablet by mouth Daily., Disp: 90 tablet, Rfl: 1    tolterodine LA (Detrol LA) 2 MG 24 hr capsule, Take 1 capsule by mouth Daily., Disp: 90 capsule, Rfl: 1    vitamin D (ERGOCALCIFEROL) 1.25 MG (41199 UT) capsule capsule, Take 1 capsule by mouth 1 (One) Time Per Week., Disp: 13 capsule, Rfl: 1    Allergies:   Allergies   Allergen Reactions    Cephalexin Hives    Latex Itching    Penicillins Hives, Rash and Itching     Other Reaction(s): Eruption of skin, Unknown, Unknown, Eruption of skin, Unknown, Eruption of skin      PER PT Updated using clean up process. Changed reactant from AMPICILLIN (ingredient) to AMPICILLIN(file - PSNDF(50.6,)             Objective     Physical Exam:  Vital Signs:   Vitals:    07/19/24 1644   BP: 154/93   Pulse: 70   Temp: 97.5 °F (36.4 °C)   SpO2: 98%   Weight: 88 kg (194 lb)   Height: 162.6 cm (64.02\")     Body mass index is 33.28 kg/m².           Physical Exam  Eyes:      Conjunctiva/sclera: Conjunctivae normal.   Neck:      Vascular: No carotid bruit. " "  Cardiovascular:      Rate and Rhythm: Normal rate and regular rhythm.      Heart sounds: Normal heart sounds. No murmur heard.  Pulmonary:      Effort: Pulmonary effort is normal.      Breath sounds: Normal breath sounds.   Abdominal:      General: Bowel sounds are normal.      Palpations: Abdomen is soft.   Musculoskeletal:      Right lower leg: No edema.      Left lower leg: No edema.   Skin:     General: Skin is warm and dry.   Neurological:      Mental Status: She is alert.   Psychiatric:         Mood and Affect: Mood normal.         Behavior: Behavior normal.             Assessment / Plan      Assessment/Plan:   Diagnoses and all orders for this visit:    1. Type 2 diabetes mellitus without complication, with long-term current use of insulin (Primary)    2. Primary hypertension    3. Mixed hyperlipidemia    4. Stage 3 chronic kidney disease, unspecified whether stage 3a or 3b CKD    5. Gastroesophageal reflux disease without esophagitis    6. Constipation, unspecified constipation type         Diabetes mellitus type 2 with remaining elevated blood sugars we will stop Januvia start Ozempic patient failed Trulicity caused nausea vomiting Jardiance causes yeast infections we will follow-up in 1 month obtaining hemoglobin A1c prior to follow-up continue reduce attic carbs sugars increase water intake exercise 30 minutes daily  Hypertension elevated in clinic today recommend follow-up in 2 weeks for manual recheck nurses visit decrease salt intake increase water intake  Hyperlipidemia LDL below go on current statin dose  Chronic kidney disease avoid all NSAIDs increase water intake will reevaluate with CMP prior to follow-up  Reflux currently controlled Pepcid  Constipation continue Colace increase water fiber in diet exercise 30 minutes daily patient ports at this time she is not exercising      Follow Up:   Return in about 1 month (around 8/19/2024).    Erwin Ascencio, HANS    \"Please note that portions of " "this note were completed with a voice recognition program.\"    "

## 2024-07-19 ENCOUNTER — OFFICE VISIT (OUTPATIENT)
Dept: FAMILY MEDICINE CLINIC | Facility: CLINIC | Age: 69
End: 2024-07-19
Payer: MEDICARE

## 2024-07-19 VITALS
WEIGHT: 194 LBS | BODY MASS INDEX: 33.12 KG/M2 | OXYGEN SATURATION: 98 % | TEMPERATURE: 97.5 F | HEART RATE: 70 BPM | DIASTOLIC BLOOD PRESSURE: 93 MMHG | HEIGHT: 64 IN | SYSTOLIC BLOOD PRESSURE: 154 MMHG

## 2024-07-19 DIAGNOSIS — K59.00 CONSTIPATION, UNSPECIFIED CONSTIPATION TYPE: ICD-10-CM

## 2024-07-19 DIAGNOSIS — Z79.4 TYPE 2 DIABETES MELLITUS WITHOUT COMPLICATION, WITH LONG-TERM CURRENT USE OF INSULIN: Primary | ICD-10-CM

## 2024-07-19 DIAGNOSIS — E11.9 TYPE 2 DIABETES MELLITUS WITHOUT COMPLICATION, WITH LONG-TERM CURRENT USE OF INSULIN: Primary | ICD-10-CM

## 2024-07-19 DIAGNOSIS — K21.9 GASTROESOPHAGEAL REFLUX DISEASE WITHOUT ESOPHAGITIS: ICD-10-CM

## 2024-07-19 DIAGNOSIS — N18.30 STAGE 3 CHRONIC KIDNEY DISEASE, UNSPECIFIED WHETHER STAGE 3A OR 3B CKD: ICD-10-CM

## 2024-07-19 DIAGNOSIS — I10 PRIMARY HYPERTENSION: ICD-10-CM

## 2024-07-19 DIAGNOSIS — E78.2 MIXED HYPERLIPIDEMIA: ICD-10-CM

## 2024-07-19 PROCEDURE — 3080F DIAST BP >= 90 MM HG: CPT | Performed by: NURSE PRACTITIONER

## 2024-07-19 PROCEDURE — 3077F SYST BP >= 140 MM HG: CPT | Performed by: NURSE PRACTITIONER

## 2024-07-19 PROCEDURE — 1126F AMNT PAIN NOTED NONE PRSNT: CPT | Performed by: NURSE PRACTITIONER

## 2024-07-19 PROCEDURE — 99214 OFFICE O/P EST MOD 30 MIN: CPT | Performed by: NURSE PRACTITIONER

## 2024-07-19 PROCEDURE — 3044F HG A1C LEVEL LT 7.0%: CPT | Performed by: NURSE PRACTITIONER

## 2024-07-25 ENCOUNTER — PREP FOR SURGERY (OUTPATIENT)
Dept: OTHER | Facility: HOSPITAL | Age: 69
End: 2024-07-25
Payer: MEDICARE

## 2024-07-25 ENCOUNTER — CLINICAL SUPPORT (OUTPATIENT)
Dept: GASTROENTEROLOGY | Facility: CLINIC | Age: 69
End: 2024-07-25
Payer: MEDICARE

## 2024-07-25 ENCOUNTER — TELEPHONE (OUTPATIENT)
Dept: GASTROENTEROLOGY | Facility: CLINIC | Age: 69
End: 2024-07-25
Payer: MEDICARE

## 2024-07-25 DIAGNOSIS — Z12.11 SCREENING FOR MALIGNANT NEOPLASM OF COLON: Primary | ICD-10-CM

## 2024-07-25 DIAGNOSIS — K21.00 GASTROESOPHAGEAL REFLUX DISEASE WITH ESOPHAGITIS, UNSPECIFIED WHETHER HEMORRHAGE: ICD-10-CM

## 2024-07-25 NOTE — PROGRESS NOTES
Vickie Bull  1955  68 y.o.    Reason for call: Screening Colonoscopy/GERD    Prep prescribed: Adela  Prep instructions reviewed with patient and sent to patient via regular mail to the home address on file    Clearance needed? Yes - Dr. Curtis George    Is the patient currently on any injectable medications for weight loss or diabetes? Yes  Ozempic --- Pt aware to HOLD at least 7 days prior.       Family history of colon cancer? Unknown -- was in foster care system  If yes, indicate relative:     The patient has been scheduled for: EGD & Colonoscopy  Procedure Date: 9/30/24  Family History   Problem Relation Age of Onset    Cancer Mother         unspecified    Lung cancer Mother     Diabetes Father         unspecified type    Prostate cancer Father     Diabetes Paternal Grandmother         unspecified type    Heart attack Other     Diabetes Other     Diabetes Paternal Aunt         unspecified type    Diabetes Paternal Uncle         unspecified type     Past Medical History:   Diagnosis Date    Acid reflux     Anemia, unspecified     Cancer August 2022    Skin cancer on face    Chronic allergic rhinitis     Condition not found     Ulcer    Corns and callus     Diabetes     Unspecified    Diarrhea 04/15/2014    GERD (gastroesophageal reflux disease)     Gout 02/13/2023    Possible gout, not sure    HBP (high blood pressure)     High cholesterol     HTN (hypertension)     Hyperlipemia     Hypothyroidism     Ingrown toenail     Leg pain     Lumbar disc herniation 11/22/2013    L3-4 very small fat lat disc    Lumbar pain     Neuropathy in diabetes 2021    Numbness in feet     Renal insufficiency 03/16/2021    Sciatica 11/22/2013    no sig structural compressive lesion    Seasonal allergies     Stomach ulcer     Thyroid disorder      Allergies   Allergen Reactions    Cephalexin Hives    Latex Itching    Penicillins Hives, Rash and Itching     Other Reaction(s): Eruption of skin, Unknown, Unknown,  Eruption of skin, Unknown, Eruption of skin      PER PT Updated using clean up process. Changed reactant from AMPICILLIN (ingredient) to AMPICILLIN(file - PSNDF(50.6,)     Past Surgical History:   Procedure Laterality Date    COLONOSCOPY      ENDOSCOPY      JOINT REPLACEMENT      SHOULDER ACROMIOCLAVICULAR JOINT REPAIR      SHOULDER SURGERY Right 10/2010    TUBAL ABDOMINAL LIGATION       Social History     Socioeconomic History    Marital status:    Tobacco Use    Smoking status: Never     Passive exposure: Never    Smokeless tobacco: Never    Tobacco comments:     second hand smoke exposure-never   Vaping Use    Vaping status: Never Used   Substance and Sexual Activity    Alcohol use: Yes     Comment: Rarely    Drug use: Never    Sexual activity: Yes     Partners: Male       Current Outpatient Medications:     allopurinol (ZYLOPRIM) 100 MG tablet, Take 2 tablets by mouth Daily for 180 days., Disp: 180 tablet, Rfl: 1    cetirizine (zyrTEC) 10 MG tablet, Take 1 tablet by mouth Every Night., Disp: 90 tablet, Rfl: 1    docusate sodium (Colace) 100 MG capsule, Take 1 capsule by mouth 2 (Two) Times a Day., Disp: 180 capsule, Rfl: 1    DULoxetine (CYMBALTA) 60 MG capsule, Take 1 capsule by mouth 2 (Two) Times a Day., Disp: 180 capsule, Rfl: 1    famotidine (Pepcid) 20 MG tablet, Take 1 tablet by mouth 2 (Two) Times a Day., Disp: 180 tablet, Rfl: 1    glucose blood (FREESTYLE LITE) test strip, Use one test strip daily to check blood sugar levels., Disp: 100 each, Rfl: 2    Lancets (freestyle) lancets, 1 each by Other route 2 (Two) Times a Day., Disp: 100 each, Rfl: 3    levothyroxine (Synthroid) 50 MCG tablet, Take 1 tablet by mouth Daily., Disp: 90 tablet, Rfl: 1    metroNIDAZOLE (METROGEL) 0.75 % gel, As directed, Disp: , Rfl:     mometasone (ELOCON) 0.1 % solution, APPLY TOPICALLY TO RASH TWICE DAILY AS NEEDED., Disp: , Rfl:     rosuvastatin (Crestor) 40 MG tablet, Take 1 tablet by mouth Every Night., Disp: 90  tablet, Rfl: 1    tolterodine LA (Detrol LA) 2 MG 24 hr capsule, Take 1 capsule by mouth Daily., Disp: 90 capsule, Rfl: 1    vitamin D (ERGOCALCIFEROL) 1.25 MG (07967 UT) capsule capsule, Take 1 capsule by mouth 1 (One) Time Per Week., Disp: 13 capsule, Rfl: 1    ammonium lactate (AmLactin) 12 % lotion, Apply  topically to the appropriate area as directed 2 (Two) Times a Day. (Patient not taking: Reported on 7/25/2024), Disp: 225 g, Rfl: 11    gabapentin (NEURONTIN) 300 MG capsule, Take 1 capsule by mouth Every Night. (Patient not taking: Reported on 7/25/2024), Disp: , Rfl:     multivitamin with minerals tablet tablet, 1 tablet Daily. (Patient not taking: Reported on 7/25/2024), Disp: , Rfl:     SITagliptin (Januvia) 25 MG tablet, Take 1 tablet by mouth Daily. (Patient not taking: Reported on 7/25/2024), Disp: 90 tablet, Rfl: 1

## 2024-08-16 ENCOUNTER — LAB (OUTPATIENT)
Dept: LAB | Facility: HOSPITAL | Age: 69
End: 2024-08-16
Payer: MEDICARE

## 2024-08-16 DIAGNOSIS — N18.30 STAGE 3 CHRONIC KIDNEY DISEASE, UNSPECIFIED WHETHER STAGE 3A OR 3B CKD: ICD-10-CM

## 2024-08-16 DIAGNOSIS — E03.9 ACQUIRED HYPOTHYROIDISM: ICD-10-CM

## 2024-08-16 DIAGNOSIS — E11.9 TYPE 2 DIABETES MELLITUS WITHOUT COMPLICATION, WITHOUT LONG-TERM CURRENT USE OF INSULIN: ICD-10-CM

## 2024-08-16 DIAGNOSIS — E78.2 MIXED HYPERLIPIDEMIA: ICD-10-CM

## 2024-08-16 DIAGNOSIS — E55.9 VITAMIN D DEFICIENCY: ICD-10-CM

## 2024-08-16 LAB
25(OH)D3 SERPL-MCNC: 72.8 NG/ML (ref 30–100)
ALBUMIN SERPL-MCNC: 4.5 G/DL (ref 3.5–5.2)
ALBUMIN/GLOB SERPL: 1.9 G/DL
ALP SERPL-CCNC: 106 U/L (ref 39–117)
ALT SERPL W P-5'-P-CCNC: 23 U/L (ref 1–33)
ANION GAP SERPL CALCULATED.3IONS-SCNC: 10 MMOL/L (ref 5–15)
AST SERPL-CCNC: 23 U/L (ref 1–32)
BACTERIA UR QL AUTO: ABNORMAL /HPF
BASOPHILS # BLD AUTO: 0.06 10*3/MM3 (ref 0–0.2)
BASOPHILS NFR BLD AUTO: 0.9 % (ref 0–1.5)
BILIRUB SERPL-MCNC: 0.3 MG/DL (ref 0–1.2)
BILIRUB UR QL STRIP: NEGATIVE
BUN SERPL-MCNC: 20 MG/DL (ref 8–23)
BUN/CREAT SERPL: 15.7 (ref 7–25)
CALCIUM SPEC-SCNC: 9.7 MG/DL (ref 8.6–10.5)
CHLORIDE SERPL-SCNC: 102 MMOL/L (ref 98–107)
CHOLEST SERPL-MCNC: 137 MG/DL (ref 0–200)
CLARITY UR: ABNORMAL
CO2 SERPL-SCNC: 26 MMOL/L (ref 22–29)
COD CRY URNS QL: ABNORMAL /HPF
COLOR UR: YELLOW
CREAT SERPL-MCNC: 1.27 MG/DL (ref 0.57–1)
DEPRECATED RDW RBC AUTO: 44.2 FL (ref 37–54)
EGFRCR SERPLBLD CKD-EPI 2021: 45.9 ML/MIN/1.73
EOSINOPHIL # BLD AUTO: 0.4 10*3/MM3 (ref 0–0.4)
EOSINOPHIL NFR BLD AUTO: 5.9 % (ref 0.3–6.2)
ERYTHROCYTE [DISTWIDTH] IN BLOOD BY AUTOMATED COUNT: 15.6 % (ref 12.3–15.4)
GLOBULIN UR ELPH-MCNC: 2.4 GM/DL
GLUCOSE SERPL-MCNC: 130 MG/DL (ref 65–99)
GLUCOSE UR STRIP-MCNC: NEGATIVE MG/DL
HBA1C MFR BLD: 6.6 % (ref 4.8–5.6)
HCT VFR BLD AUTO: 41 % (ref 34–46.6)
HDLC SERPL-MCNC: 57 MG/DL (ref 40–60)
HGB BLD-MCNC: 12.8 G/DL (ref 12–15.9)
HGB UR QL STRIP.AUTO: ABNORMAL
HOLD SPECIMEN: NORMAL
HYALINE CASTS UR QL AUTO: ABNORMAL /LPF
IMM GRANULOCYTES # BLD AUTO: 0.05 10*3/MM3 (ref 0–0.05)
IMM GRANULOCYTES NFR BLD AUTO: 0.7 % (ref 0–0.5)
KETONES UR QL STRIP: NEGATIVE
LDLC SERPL CALC-MCNC: 60 MG/DL (ref 0–100)
LDLC/HDLC SERPL: 1 {RATIO}
LEUKOCYTE ESTERASE UR QL STRIP.AUTO: NEGATIVE
LYMPHOCYTES # BLD AUTO: 1.37 10*3/MM3 (ref 0.7–3.1)
LYMPHOCYTES NFR BLD AUTO: 20.1 % (ref 19.6–45.3)
MCH RBC QN AUTO: 24.8 PG (ref 26.6–33)
MCHC RBC AUTO-ENTMCNC: 31.2 G/DL (ref 31.5–35.7)
MCV RBC AUTO: 79.3 FL (ref 79–97)
MONOCYTES # BLD AUTO: 0.73 10*3/MM3 (ref 0.1–0.9)
MONOCYTES NFR BLD AUTO: 10.7 % (ref 5–12)
NEUTROPHILS NFR BLD AUTO: 4.19 10*3/MM3 (ref 1.7–7)
NEUTROPHILS NFR BLD AUTO: 61.7 % (ref 42.7–76)
NITRITE UR QL STRIP: NEGATIVE
NRBC BLD AUTO-RTO: 0 /100 WBC (ref 0–0.2)
PH UR STRIP.AUTO: 6 [PH] (ref 5–8)
PLATELET # BLD AUTO: 288 10*3/MM3 (ref 140–450)
PMV BLD AUTO: 10.6 FL (ref 6–12)
POTASSIUM SERPL-SCNC: 4.1 MMOL/L (ref 3.5–5.2)
PROT SERPL-MCNC: 6.9 G/DL (ref 6–8.5)
PROT UR QL STRIP: ABNORMAL
RBC # BLD AUTO: 5.17 10*6/MM3 (ref 3.77–5.28)
RBC # UR STRIP: ABNORMAL /HPF
REF LAB TEST METHOD: ABNORMAL
SODIUM SERPL-SCNC: 138 MMOL/L (ref 136–145)
SP GR UR STRIP: 1.02 (ref 1–1.03)
SQUAMOUS #/AREA URNS HPF: ABNORMAL /HPF
TRIGL SERPL-MCNC: 114 MG/DL (ref 0–150)
TSH SERPL DL<=0.05 MIU/L-ACNC: 1.1 UIU/ML (ref 0.27–4.2)
UROBILINOGEN UR QL STRIP: ABNORMAL
VLDLC SERPL-MCNC: 20 MG/DL (ref 5–40)
WBC # UR STRIP: ABNORMAL /HPF
WBC NRBC COR # BLD AUTO: 6.8 10*3/MM3 (ref 3.4–10.8)

## 2024-08-16 PROCEDURE — 82306 VITAMIN D 25 HYDROXY: CPT

## 2024-08-16 PROCEDURE — 81001 URINALYSIS AUTO W/SCOPE: CPT

## 2024-08-16 PROCEDURE — 84443 ASSAY THYROID STIM HORMONE: CPT

## 2024-08-16 PROCEDURE — 80053 COMPREHEN METABOLIC PANEL: CPT

## 2024-08-16 PROCEDURE — 83036 HEMOGLOBIN GLYCOSYLATED A1C: CPT

## 2024-08-16 PROCEDURE — 80061 LIPID PANEL: CPT

## 2024-08-16 PROCEDURE — 85025 COMPLETE CBC W/AUTO DIFF WBC: CPT

## 2024-08-16 RX ORDER — KETOCONAZOLE 20 MG/ML
SHAMPOO TOPICAL
COMMUNITY
Start: 2024-08-04

## 2024-08-16 RX ORDER — AZELAIC ACID 0.15 G/G
GEL TOPICAL
COMMUNITY
Start: 2024-07-31

## 2024-08-16 NOTE — PROGRESS NOTES
Follow Up Office Visit      Patient Name: Vickie Bull  : 1955   MRN: [id number]     Chief Complaint:    Chief Complaint   Patient presents with    Hypertension    Hypothyroidism    Hyperlipidemia    Diabetes    Chronic Kidney Disease       History of Present Illness: Vickie Bull is a 68 y.o. female who is here today to follow up for DM2, hyperlipidemia, HTN, hypothyroidism, CKD   Review lab results     A1C-2024  Eye exam- Dr Fields  Foot exam- Dr Balderrama. 2023  mammogram-2024  dexa-2023  Pap-  Dr Miller  Colonoscopy-2019 normal   EGD     C/o She states she tried Ozempic, but it made her too feel bad.    Follow up cardiology 2024 per progress note   Assessment, Management, and Plan:     Overall, she seems to be doing well from a cardiovascular standpoint. Her blood pressure appears to be optimally controlled. She seems euvolemic on physical examination and does not appear to be demonstrating any symptoms concerning for progression of her valvular heart disease.    She has a history of right ventricular enlargement which is likely secondary to obstructive sleep apnea. Unfortunately, she has not been utilizing nightly CPAP therapy. On today's visit, I will arrange for her to undergo evaluation with a surface echocardiogram to reassess her right ventricular enlargement.    Given her diabetes mellitus, she is certainly at increased risk for coronary artery disease. On today's visit, I will arrange for her to undergo evaluation with a coronary calcium score for further cardiac risk stratification.    I will otherwise plan on seeing her back in approximately 6 months for routine follow-up.        Subjective      Review of Systems:   Review of Systems   Constitutional:  Negative for fever.   Respiratory:  Negative for cough.    Cardiovascular:  Negative for chest pain.   Gastrointestinal:  Negative for abdominal pain, constipation, diarrhea, nausea and vomiting.    Genitourinary:  Negative for dysuria.   Musculoskeletal:  Negative for myalgias.        Past Medical History:   Past Medical History:   Diagnosis Date    Acid reflux     Anemia, unspecified     Cancer August 2022    Skin cancer on face    Chronic allergic rhinitis     Condition not found     Ulcer    Corns and callus     Diabetes     Unspecified    Diarrhea 04/15/2014    GERD (gastroesophageal reflux disease)     Gout 02/13/2023    Possible gout, not sure    HBP (high blood pressure)     High cholesterol     HTN (hypertension)     Hyperlipemia     Hypothyroidism     Ingrown toenail     Leg pain     Lumbar disc herniation 11/22/2013    L3-4 very small fat lat disc    Lumbar pain     Neuropathy in diabetes 2021    Numbness in feet     Renal insufficiency 03/16/2021    Sciatica 11/22/2013    no sig structural compressive lesion    Seasonal allergies     Stomach ulcer     Thyroid disorder        Past Surgical History:   Past Surgical History:   Procedure Laterality Date    COLONOSCOPY      ENDOSCOPY      JOINT REPLACEMENT      SHOULDER ACROMIOCLAVICULAR JOINT REPAIR      SHOULDER SURGERY Right 10/2010    TUBAL ABDOMINAL LIGATION         Family History:   Family History   Problem Relation Age of Onset    Cancer Mother         unspecified    Lung cancer Mother     Diabetes Father         unspecified type    Prostate cancer Father     Diabetes Paternal Grandmother         unspecified type    Heart attack Other     Diabetes Other     Diabetes Paternal Aunt         unspecified type    Diabetes Paternal Uncle         unspecified type       Social History:   Social History     Socioeconomic History    Marital status:    Tobacco Use    Smoking status: Never     Passive exposure: Never    Smokeless tobacco: Never    Tobacco comments:     second hand smoke exposure-never   Vaping Use    Vaping status: Never Used   Substance and Sexual Activity    Alcohol use: Yes     Comment: Rarely    Drug use: Never    Sexual activity:  Yes     Partners: Male       Medications:     Current Outpatient Medications:     allopurinol (ZYLOPRIM) 100 MG tablet, Take 2 tablets by mouth Daily for 180 days., Disp: 180 tablet, Rfl: 1    azelaic acid (AZELEX) 15 % gel, , Disp: , Rfl:     cetirizine (zyrTEC) 10 MG tablet, Take 1 tablet by mouth Every Night., Disp: 90 tablet, Rfl: 1    docusate sodium (Colace) 100 MG capsule, Take 1 capsule by mouth 2 (Two) Times a Day., Disp: 180 capsule, Rfl: 1    DULoxetine (CYMBALTA) 60 MG capsule, Take 1 capsule by mouth 2 (Two) Times a Day., Disp: 180 capsule, Rfl: 1    famotidine (Pepcid) 20 MG tablet, Take 1 tablet by mouth 2 (Two) Times a Day., Disp: 180 tablet, Rfl: 1    glucose blood (FREESTYLE LITE) test strip, Use one test strip daily to check blood sugar levels., Disp: 100 each, Rfl: 2    ketoconazole (NIZORAL) 2 % shampoo, , Disp: , Rfl:     Lancets (freestyle) lancets, 1 each by Other route 2 (Two) Times a Day., Disp: 100 each, Rfl: 3    levothyroxine (Synthroid) 50 MCG tablet, Take 1 tablet by mouth Daily., Disp: 90 tablet, Rfl: 1    metroNIDAZOLE (METROGEL) 0.75 % gel, As directed, Disp: , Rfl:     mometasone (ELOCON) 0.1 % solution, APPLY TOPICALLY TO RASH TWICE DAILY AS NEEDED., Disp: , Rfl:     polyethylene glycol (GoLYTELY) 236 g solution, Take per office instructions, Disp: 4000 mL, Rfl: 0    rosuvastatin (Crestor) 40 MG tablet, Take 1 tablet by mouth Every Night., Disp: 90 tablet, Rfl: 1    tolterodine LA (Detrol LA) 2 MG 24 hr capsule, Take 1 capsule by mouth Daily., Disp: 90 capsule, Rfl: 1    vitamin D (ERGOCALCIFEROL) 1.25 MG (85431 UT) capsule capsule, Take 1 capsule by mouth 1 (One) Time Per Week., Disp: 13 capsule, Rfl: 1    SITagliptin (Januvia) 25 MG tablet, Take 1 tablet by mouth Daily. (Patient not taking: Reported on 7/25/2024), Disp: 90 tablet, Rfl: 1    Allergies:   Allergies   Allergen Reactions    Cephalexin Hives    Latex Itching    Penicillins Hives, Rash and Itching     Other  "Reaction(s): Eruption of skin, Unknown, Unknown, Eruption of skin, Unknown, Eruption of skin      PER PT Updated using clean up process. Changed reactant from AMPICILLIN (ingredient) to AMPICILLIN(file - PSNDF(50.6,)             Objective     Physical Exam:  Vital Signs:   Vitals:    08/20/24 1146   BP: 123/82   Pulse: 99   Temp: 96.8 °F (36 °C)   SpO2: 95%   Weight: 88.5 kg (195 lb)   Height: 162.6 cm (64.02\")     Body mass index is 33.45 kg/m².           Physical Exam  Eyes:      Conjunctiva/sclera: Conjunctivae normal.   Neck:      Vascular: No carotid bruit.   Cardiovascular:      Rate and Rhythm: Normal rate and regular rhythm.      Heart sounds: Normal heart sounds. No murmur heard.  Pulmonary:      Effort: Pulmonary effort is normal.      Breath sounds: Normal breath sounds.   Abdominal:      General: Bowel sounds are normal.      Palpations: Abdomen is soft.   Musculoskeletal:      Right lower leg: No edema.      Left lower leg: No edema.   Skin:     General: Skin is warm and dry.   Neurological:      Mental Status: She is alert.   Psychiatric:         Mood and Affect: Mood normal.         Behavior: Behavior normal.             Assessment / Plan      Assessment/Plan:   Diagnoses and all orders for this visit:    1. Type 2 diabetes mellitus without complication, with long-term current use of insulin (Primary)    2. High cholesterol    3. Primary hypertension    4. Acquired hypothyroidism    5. Stage 3 chronic kidney disease, unspecified whether stage 3a or 3b CKD    6. Vitamin D deficiency    7. Gastroesophageal reflux disease with esophagitis, unspecified whether hemorrhage    8. Gout, unspecified cause, unspecified chronicity, unspecified site    9. Seasonal allergic rhinitis, unspecified trigger    10. Other constipation         Diabetes mellitus type 2 currently controlled we will continue Januvia at this time patient unable to tolerate Jardiance unable to tolerate Ozempic  Hyperlipidemia LDL below goal " "on current statin dose Crestor 40 mg at nighttime denies myalgias  Hypertension currently controlled  Hypothyroidism TSH within goal range on current Synthroid dose 50 mcg daily denies palpitations  Chronic kidney disease avoid all NSAIDs increase water intake unable to tolerate Jardiance which is patient's insurance choice over Farxiga will continue to monitor CMP creatinine has improved over the past 6 months  Vitamin D deficiency continue current supplementation will monitor 90-day follow-up  Her reflux controlled with Pepcid at this time currently not taking her proton pump inhibitor due to kidney disease  Gout no recent flares will continue allopurinol monitor 90 days  Seasonal allergies currently controlled with Zyrtec  Constipation continue MiraLAX is scheduled for endoscopy colonoscopy with gastroenterology      Follow Up:   Return in about 3 months (around 11/20/2024).    Erwin Ascencio, APRVALERY    \"Please note that portions of this note were completed with a voice recognition program.\"    "

## 2024-08-19 PROBLEM — K59.09 OTHER CONSTIPATION: Status: ACTIVE | Noted: 2024-08-19

## 2024-08-19 PROBLEM — N28.9 RENAL INSUFFICIENCY: Status: RESOLVED | Noted: 2021-03-16 | Resolved: 2024-08-19

## 2024-08-20 ENCOUNTER — OFFICE VISIT (OUTPATIENT)
Dept: FAMILY MEDICINE CLINIC | Facility: CLINIC | Age: 69
End: 2024-08-20
Payer: MEDICARE

## 2024-08-20 VITALS
HEIGHT: 64 IN | WEIGHT: 195 LBS | OXYGEN SATURATION: 95 % | BODY MASS INDEX: 33.29 KG/M2 | TEMPERATURE: 96.8 F | SYSTOLIC BLOOD PRESSURE: 123 MMHG | HEART RATE: 99 BPM | DIASTOLIC BLOOD PRESSURE: 82 MMHG

## 2024-08-20 DIAGNOSIS — E11.9 TYPE 2 DIABETES MELLITUS WITHOUT COMPLICATION, WITH LONG-TERM CURRENT USE OF INSULIN: Primary | ICD-10-CM

## 2024-08-20 DIAGNOSIS — E78.00 HIGH CHOLESTEROL: ICD-10-CM

## 2024-08-20 DIAGNOSIS — I10 PRIMARY HYPERTENSION: ICD-10-CM

## 2024-08-20 DIAGNOSIS — Z79.4 TYPE 2 DIABETES MELLITUS WITHOUT COMPLICATION, WITH LONG-TERM CURRENT USE OF INSULIN: Primary | ICD-10-CM

## 2024-08-20 DIAGNOSIS — E55.9 VITAMIN D DEFICIENCY: ICD-10-CM

## 2024-08-20 DIAGNOSIS — M10.9 GOUT, UNSPECIFIED CAUSE, UNSPECIFIED CHRONICITY, UNSPECIFIED SITE: ICD-10-CM

## 2024-08-20 DIAGNOSIS — E03.9 ACQUIRED HYPOTHYROIDISM: ICD-10-CM

## 2024-08-20 DIAGNOSIS — J30.2 SEASONAL ALLERGIC RHINITIS, UNSPECIFIED TRIGGER: ICD-10-CM

## 2024-08-20 DIAGNOSIS — K59.09 OTHER CONSTIPATION: ICD-10-CM

## 2024-08-20 DIAGNOSIS — K21.00 GASTROESOPHAGEAL REFLUX DISEASE WITH ESOPHAGITIS, UNSPECIFIED WHETHER HEMORRHAGE: ICD-10-CM

## 2024-08-20 DIAGNOSIS — N18.30 STAGE 3 CHRONIC KIDNEY DISEASE, UNSPECIFIED WHETHER STAGE 3A OR 3B CKD: ICD-10-CM

## 2024-08-20 PROCEDURE — 3079F DIAST BP 80-89 MM HG: CPT | Performed by: NURSE PRACTITIONER

## 2024-08-20 PROCEDURE — 3074F SYST BP LT 130 MM HG: CPT | Performed by: NURSE PRACTITIONER

## 2024-08-20 PROCEDURE — 1126F AMNT PAIN NOTED NONE PRSNT: CPT | Performed by: NURSE PRACTITIONER

## 2024-08-20 PROCEDURE — 3044F HG A1C LEVEL LT 7.0%: CPT | Performed by: NURSE PRACTITIONER

## 2024-08-20 PROCEDURE — 99214 OFFICE O/P EST MOD 30 MIN: CPT | Performed by: NURSE PRACTITIONER

## 2024-08-30 ENCOUNTER — TELEPHONE (OUTPATIENT)
Dept: FAMILY MEDICINE CLINIC | Facility: CLINIC | Age: 69
End: 2024-08-30
Payer: MEDICARE

## 2024-08-30 DIAGNOSIS — E11.9 TYPE 2 DIABETES MELLITUS WITHOUT COMPLICATION, WITH LONG-TERM CURRENT USE OF INSULIN: Primary | ICD-10-CM

## 2024-08-30 DIAGNOSIS — Z79.4 TYPE 2 DIABETES MELLITUS WITHOUT COMPLICATION, WITH LONG-TERM CURRENT USE OF INSULIN: Primary | ICD-10-CM

## 2024-08-30 NOTE — TELEPHONE ENCOUNTER
Caller: Vickie Bull    Relationship: Self    Best call back number: [phone]     What medication are you requesting: MOUNJARO    What are your current symptoms: DIABETES    How long have you been experiencing symptoms: N/A    Have you had these symptoms before:    [x] Yes  [] No    Have you been treated for these symptoms before:   [x] Yes  [] No    If a prescription is needed, what is your preferred pharmacy and phone number: [facility] [place] [phone] [facility] [phone]      Additional notes:  PATIENT WOULD LIKE TO KNOW IF BELA WOULD WANT HER TO TRY MOUNJARO. IF SO, SHE WOULD LIKE TO KNOW IF THE OFFICE HAS ANY SAMPLES.

## 2024-09-23 NOTE — PRE-PROCEDURE INSTRUCTIONS
"Instructed on date and arrival time of 0700. Instructed that arrival time is not their procedure time but allows time to prepare for procedure.  Come to entrance \"C\". Must have  over age 18 to drive home.  May have two visitors; however, children under 12 must stay in waiting room.  Discussed clear liquid diet (no red or purple), bowel prep, and NPO.  May take medications as usual except for blood thinners, diabetic medications, and weight loss medications.  Verbalized understanding of instructions given.  Instructed to call for questions or concerns.  Hold Mounjaro for one week prior to procedure.  Cardiac clearance noted in chart.  "

## 2024-09-27 ENCOUNTER — ANESTHESIA EVENT (OUTPATIENT)
Dept: GASTROENTEROLOGY | Facility: HOSPITAL | Age: 69
End: 2024-09-27
Payer: MEDICARE

## 2024-09-27 NOTE — ANESTHESIA PREPROCEDURE EVALUATION
Anesthesia Evaluation     NPO Solid Status: > 8 hours  NPO Liquid Status: > 2 hours           Airway   Mallampati: I  TM distance: >3 FB  Neck ROM: full  No difficulty expected  Dental - normal exam     Pulmonary - normal exam   (+) ,sleep apnea (does not wear CPAP)  Cardiovascular - normal exam    (+) hypertension well controlled less than 2 medications, hyperlipidemia      Neuro/Psych  (+) numbness  GI/Hepatic/Renal/Endo    (+) GERD, PUD, renal disease (CKD stage 4)-, diabetes mellitus type 2, thyroid problem hypothyroidism    Musculoskeletal     Abdominal    Substance History      OB/GYN          Other   arthritis,   history of cancer    ROS/Med Hx Other: Mounjaro- Hasn't started it.    ECHO (06/28/2023) demonstrating a left ventricular EF 60-65% with mild MR and mild TR.  ECHO (06/28/2023) demonstrating mild RV dilation with normal RV function (RVSP 13 mmHg).  History of KELLY, unable to tolerate CPAP.                         Anesthesia Plan    ASA 3     general   total IV anesthesia  (Total IV Anesthesia    Patient understands anesthesia not responsible for dental damage.  )  intravenous induction     Anesthetic plan, risks, benefits, and alternatives have been provided, discussed and informed consent has been obtained with: patient.    Plan discussed with CRNA.        CODE STATUS:

## 2024-09-30 ENCOUNTER — HOSPITAL ENCOUNTER (OUTPATIENT)
Facility: HOSPITAL | Age: 69
Setting detail: HOSPITAL OUTPATIENT SURGERY
Discharge: HOME | End: 2024-09-30
Attending: INTERNAL MEDICINE | Admitting: INTERNAL MEDICINE
Payer: MEDICARE

## 2024-09-30 ENCOUNTER — ANESTHESIA (OUTPATIENT)
Dept: GASTROENTEROLOGY | Facility: HOSPITAL | Age: 69
End: 2024-09-30
Payer: MEDICARE

## 2024-09-30 VITALS
DIASTOLIC BLOOD PRESSURE: 56 MMHG | TEMPERATURE: 96.2 F | OXYGEN SATURATION: 99 % | HEART RATE: 70 BPM | SYSTOLIC BLOOD PRESSURE: 145 MMHG | RESPIRATION RATE: 14 BRPM

## 2024-09-30 DIAGNOSIS — Z12.11 SCREENING FOR MALIGNANT NEOPLASM OF COLON: ICD-10-CM

## 2024-09-30 DIAGNOSIS — K21.00 GASTROESOPHAGEAL REFLUX DISEASE WITH ESOPHAGITIS, UNSPECIFIED WHETHER HEMORRHAGE: ICD-10-CM

## 2024-09-30 LAB — GLUCOSE BLDC GLUCOMTR-MCNC: 104 MG/DL (ref 70–99)

## 2024-09-30 PROCEDURE — 25810000003 SODIUM CHLORIDE 0.9 % SOLUTION

## 2024-09-30 PROCEDURE — 82948 REAGENT STRIP/BLOOD GLUCOSE: CPT | Performed by: NURSE ANESTHETIST, CERTIFIED REGISTERED

## 2024-09-30 PROCEDURE — 25010000002 PROPOFOL 10 MG/ML EMULSION: Performed by: NURSE ANESTHETIST, CERTIFIED REGISTERED

## 2024-09-30 PROCEDURE — 25810000003 LACTATED RINGERS PER 1000 ML: Performed by: NURSE ANESTHETIST, CERTIFIED REGISTERED

## 2024-09-30 PROCEDURE — C1726 CATH, BAL DIL, NON-VASCULAR: HCPCS | Performed by: INTERNAL MEDICINE

## 2024-09-30 PROCEDURE — 88305 TISSUE EXAM BY PATHOLOGIST: CPT | Performed by: INTERNAL MEDICINE

## 2024-09-30 RX ORDER — SODIUM CHLORIDE, SODIUM LACTATE, POTASSIUM CHLORIDE, CALCIUM CHLORIDE 600; 310; 30; 20 MG/100ML; MG/100ML; MG/100ML; MG/100ML
INJECTION, SOLUTION INTRAVENOUS CONTINUOUS PRN
Status: DISCONTINUED | OUTPATIENT
Start: 2024-09-30 | End: 2024-09-30 | Stop reason: SURG

## 2024-09-30 RX ORDER — SODIUM CHLORIDE, SODIUM LACTATE, POTASSIUM CHLORIDE, CALCIUM CHLORIDE 600; 310; 30; 20 MG/100ML; MG/100ML; MG/100ML; MG/100ML
30 INJECTION, SOLUTION INTRAVENOUS CONTINUOUS
Status: DISCONTINUED | OUTPATIENT
Start: 2024-09-30 | End: 2024-09-30

## 2024-09-30 RX ORDER — SODIUM CHLORIDE 9 MG/ML
50 INJECTION, SOLUTION INTRAVENOUS CONTINUOUS
Status: DISCONTINUED | OUTPATIENT
Start: 2024-09-30 | End: 2024-09-30 | Stop reason: HOSPADM

## 2024-09-30 RX ORDER — LIDOCAINE HYDROCHLORIDE 20 MG/ML
INJECTION, SOLUTION EPIDURAL; INFILTRATION; INTRACAUDAL; PERINEURAL AS NEEDED
Status: DISCONTINUED | OUTPATIENT
Start: 2024-09-30 | End: 2024-09-30 | Stop reason: SURG

## 2024-09-30 RX ORDER — PROPOFOL 10 MG/ML
VIAL (ML) INTRAVENOUS AS NEEDED
Status: DISCONTINUED | OUTPATIENT
Start: 2024-09-30 | End: 2024-09-30 | Stop reason: SURG

## 2024-09-30 RX ADMIN — PROPOFOL 200 MG: 10 INJECTION, EMULSION INTRAVENOUS at 10:00

## 2024-09-30 RX ADMIN — PROPOFOL 50 MG: 10 INJECTION, EMULSION INTRAVENOUS at 10:19

## 2024-09-30 RX ADMIN — PROPOFOL 100 MG: 10 INJECTION, EMULSION INTRAVENOUS at 10:09

## 2024-09-30 RX ADMIN — SODIUM CHLORIDE, POTASSIUM CHLORIDE, SODIUM LACTATE AND CALCIUM CHLORIDE: 600; 310; 30; 20 INJECTION, SOLUTION INTRAVENOUS at 09:50

## 2024-09-30 RX ADMIN — PROPOFOL 100 MG: 10 INJECTION, EMULSION INTRAVENOUS at 09:52

## 2024-09-30 RX ADMIN — LIDOCAINE HYDROCHLORIDE 40 MG: 20 INJECTION, SOLUTION EPIDURAL; INFILTRATION; INTRACAUDAL; PERINEURAL at 09:53

## 2024-09-30 RX ADMIN — SODIUM CHLORIDE 50 ML/HR: 9 INJECTION, SOLUTION INTRAVENOUS at 08:26

## 2024-09-30 NOTE — H&P
Pre Procedure History & Physical    Chief Complaint:   Persistent GERD and screening for colon cancer    Subjective     HPI:   Persistent GERD and screening for colon cancer    Past Medical History:   Past Medical History:   Diagnosis Date    Acid reflux     Anemia, unspecified     Cancer August 2022    Skin cancer on face    Chronic allergic rhinitis     Condition not found     Ulcer    Corns and callus     Diabetes     Unspecified    Diarrhea 04/15/2014    GERD (gastroesophageal reflux disease)     Gout 02/13/2023    Possible gout, not sure    HBP (high blood pressure)     High cholesterol     HTN (hypertension)     Hyperlipemia     Hypothyroidism     Ingrown toenail     Leg pain     Lumbar disc herniation 11/22/2013    L3-4 very small fat lat disc    Lumbar pain     Neuropathy in diabetes 2021    Numbness in feet     Renal insufficiency 03/16/2021    Sciatica 11/22/2013    no sig structural compressive lesion    Seasonal allergies     Stomach ulcer     Thyroid disorder        Past Surgical History:  Past Surgical History:   Procedure Laterality Date    COLONOSCOPY      ENDOSCOPY      JOINT REPLACEMENT      SHOULDER ACROMIOCLAVICULAR JOINT REPAIR      SHOULDER SURGERY Right 10/2010    TUBAL ABDOMINAL LIGATION         Family History:  Family History   Problem Relation Age of Onset    Cancer Mother         unspecified    Lung cancer Mother     Diabetes Father         unspecified type    Prostate cancer Father     Diabetes Paternal Grandmother         unspecified type    Heart attack Other     Diabetes Other     Diabetes Paternal Aunt         unspecified type    Diabetes Paternal Uncle         unspecified type       Social History:   reports that she has never smoked. She has never been exposed to tobacco smoke. She has never used smokeless tobacco. She reports current alcohol use. She reports that she does not use drugs.    Medications:   Medications Prior to Admission   Medication Sig Dispense Refill Last Dose     allopurinol (ZYLOPRIM) 100 MG tablet Take 2 tablets by mouth Daily for 180 days. 180 tablet 1     azelaic acid (AZELEX) 15 % gel        cetirizine (zyrTEC) 10 MG tablet Take 1 tablet by mouth Every Night. 90 tablet 1     docusate sodium (Colace) 100 MG capsule Take 1 capsule by mouth 2 (Two) Times a Day. 180 capsule 1     DULoxetine (CYMBALTA) 60 MG capsule Take 1 capsule by mouth 2 (Two) Times a Day. 180 capsule 1     famotidine (Pepcid) 20 MG tablet Take 1 tablet by mouth 2 (Two) Times a Day. 180 tablet 1     glucose blood (FREESTYLE LITE) test strip Use one test strip daily to check blood sugar levels. 100 each 2     ketoconazole (NIZORAL) 2 % shampoo        Lancets (freestyle) lancets 1 each by Other route 2 (Two) Times a Day. 100 each 3     levothyroxine (Synthroid) 50 MCG tablet Take 1 tablet by mouth Daily. 90 tablet 1     metroNIDAZOLE (METROGEL) 0.75 % gel As directed       mometasone (ELOCON) 0.1 % solution APPLY TOPICALLY TO RASH TWICE DAILY AS NEEDED.       polyethylene glycol (GoLYTELY) 236 g solution Take per office instructions 4000 mL 0     rosuvastatin (Crestor) 40 MG tablet Take 1 tablet by mouth Every Night. 90 tablet 1     SITagliptin (Januvia) 25 MG tablet Take 1 tablet by mouth Daily. 90 tablet 1     Tirzepatide (MOUNJARO) 2.5 MG/0.5ML solution pen-injector pen Inject 0.5 mL under the skin into the appropriate area as directed 1 (One) Time Per Week. 2 mL 0     tolterodine LA (Detrol LA) 2 MG 24 hr capsule Take 1 capsule by mouth Daily. 90 capsule 1     vitamin D (ERGOCALCIFEROL) 1.25 MG (31600 UT) capsule capsule Take 1 capsule by mouth 1 (One) Time Per Week. 13 capsule 1        Allergies:  Cephalexin, Latex, and Penicillins        Objective     Blood pressure 145/63, pulse 77, temperature 97 °F (36.1 °C), temperature source Temporal, resp. rate 18, SpO2 97%, not currently breastfeeding.    Physical Exam   Constitutional: Pt is oriented to person, place, and time and well-developed,  well-nourished, and in no distress.   Mouth/Throat: Oropharynx is clear and moist.   Neck: Normal range of motion.   Cardiovascular: Normal rate, regular rhythm and normal heart sounds.    Pulmonary/Chest: Effort normal and breath sounds normal.   Abdominal: Soft. Nontender  Skin: Skin is warm and dry.   Psychiatric: Mood, memory, affect and judgment normal.     Assessment & Plan     Diagnosis:  Persistent GERD and screening for colon cancer    Anticipated Surgical Procedure:  EGD and colonoscopy    The risks, benefits, and alternatives of this procedure have been discussed with the patient or the responsible party- the patient understands and agrees to proceed.

## 2024-09-30 NOTE — ANESTHESIA POSTPROCEDURE EVALUATION
Patient: Vickie Bull    Procedure Summary       Date: 09/30/24 Room / Location: [facility] ENDOSCOPY 4 / [facility] ENDOSCOPY    Anesthesia Start: 0949 Anesthesia Stop: 1035    Procedures:       ESOPHAGOGASTRODUODENOSCOPY WITH BALLOON DILATION OF ESOPHAGUS      COLONOSCOPY FOR SCREENING Diagnosis:       Screening for malignant neoplasm of colon      Gastroesophageal reflux disease with esophagitis, unspecified whether hemorrhage      (Screening for malignant neoplasm of colon [Z12.11])      (Gastroesophageal reflux disease with esophagitis, unspecified whether hemorrhage [K21.00])    Surgeons: Oswald Farias MD Provider: Jacob Manzo CRNA    Anesthesia Type: general ASA Status: 3            Anesthesia Type: general    Vitals  Vitals Value Taken Time   /56 09/30/24 1044   Temp 35.7 °C (96.2 °F) 09/30/24 1043   Pulse 67 09/30/24 1047   Resp 14 09/30/24 1043   SpO2 99 % 09/30/24 1047   Vitals shown include unfiled device data.        Post Anesthesia Care and Evaluation    Post-procedure mental status: acceptable.  Pain management: satisfactory to patient    Airway patency: patent  Anesthetic complications: No anesthetic complications    Cardiovascular status: acceptable  Respiratory status: acceptable    Comments: Per chart review

## 2024-10-01 ENCOUNTER — PREP FOR SURGERY (OUTPATIENT)
Dept: OTHER | Facility: HOSPITAL | Age: 69
End: 2024-10-01
Payer: MEDICARE

## 2024-10-01 DIAGNOSIS — Z12.11 SCREENING FOR MALIGNANT NEOPLASM OF COLON: Primary | ICD-10-CM

## 2024-10-01 LAB
CYTO UR: NORMAL
LAB AP CASE REPORT: NORMAL
LAB AP CLINICAL INFORMATION: NORMAL
PATH REPORT.FINAL DX SPEC: NORMAL
PATH REPORT.GROSS SPEC: NORMAL

## 2024-10-10 ENCOUNTER — TELEPHONE (OUTPATIENT)
Dept: GASTROENTEROLOGY | Facility: CLINIC | Age: 69
End: 2024-10-10
Payer: MEDICARE

## 2024-10-10 NOTE — TELEPHONE ENCOUNTER
----- Message from April C sent at 10/1/2024  4:12 PM EDT -----    ----- Message -----  From: Osiris Vences APRN  Sent: 10/1/2024   2:14 PM EDT  To: [facility] Clinical Pool    I have reviewed the patients upper endoscopy and pathology.  Report shows a mild Schatzki's ring with successful dilation performed.  Esophageal biopsies consistent with mild chronic inflammation.  Biopsies are negative for dysplasia, metaplasia, and malignancy.  Continue Protonix 40 mg daily.    Colonoscopy shows poor prep precluding visualization therefore procedure could not be completed.  Patient will need repeat colonoscopy in 3 months.  Please troubleshoot any previous prep issues.  Case request placed. 4L sent to pharmacy.

## 2024-10-10 NOTE — TELEPHONE ENCOUNTER
Pt is currently out of town and unable to pick a date for repeat procedure. She asked if she could call back next week.     RE: EGD/Colonoscopy Results/Plan --- RESULT NOTES/In Basket    Postponing Result Note until next Friday, 10.18.24

## 2024-10-24 ENCOUNTER — TELEPHONE (OUTPATIENT)
Dept: GASTROENTEROLOGY | Facility: CLINIC | Age: 69
End: 2024-10-24
Payer: MEDICARE

## 2024-10-24 NOTE — TELEPHONE ENCOUNTER
Hub staff attempted to follow warm transfer process and was unsuccessful     Caller: Vickie Bull    Relationship to patient: Self    Best call back number:  [phone]    Patient is needing: PT IS RETURNING APRIL'S CALL, PLEASE REACH BACK OUT TO PT.

## 2024-10-30 ENCOUNTER — TELEPHONE (OUTPATIENT)
Dept: FAMILY MEDICINE CLINIC | Facility: CLINIC | Age: 69
End: 2024-10-30
Payer: MEDICARE

## 2024-10-31 ENCOUNTER — TELEPHONE (OUTPATIENT)
Dept: GASTROENTEROLOGY | Facility: CLINIC | Age: 69
End: 2024-10-31

## 2024-10-31 NOTE — TELEPHONE ENCOUNTER
Patient called to report she is struggling with diarrhea today and was unsure if she would be able to make it to her appointment.   We have discussed R/S OV or telehealth.   Patient reports constipation for 2 weeks and diarrhea starting this morning, denies blood, patient has been taking magnesium the past 5 night to help her bowels move.   Patient has decided to R/S appointment, R/S 11/14/2024  She will call if needed. She will follow up in ED if concerns for dehydration

## 2024-11-01 RX ORDER — TIRZEPATIDE 5 MG/.5ML
5 INJECTION, SOLUTION SUBCUTANEOUS
Qty: 2 ML | Refills: 1 | Status: SHIPPED | OUTPATIENT
Start: 2024-11-01

## 2024-11-14 ENCOUNTER — OFFICE VISIT (OUTPATIENT)
Dept: GASTROENTEROLOGY | Facility: CLINIC | Age: 69
End: 2024-11-14
Payer: MEDICARE

## 2024-11-14 VITALS
WEIGHT: 181 LBS | DIASTOLIC BLOOD PRESSURE: 75 MMHG | SYSTOLIC BLOOD PRESSURE: 140 MMHG | HEART RATE: 95 BPM | OXYGEN SATURATION: 100 % | HEIGHT: 64 IN | BODY MASS INDEX: 30.9 KG/M2

## 2024-11-14 DIAGNOSIS — K59.03 DRUG-INDUCED CONSTIPATION: Primary | ICD-10-CM

## 2024-11-14 DIAGNOSIS — Z12.11 SCREENING FOR MALIGNANT NEOPLASM OF COLON: ICD-10-CM

## 2024-11-14 NOTE — PROGRESS NOTES
Chief Complaint  EGD follow up  and Constipation    Vickie Bull is a 68 y.o. female who presents to [facility] [place] for constipation.     History of present Illness  EGD/Colonoscopy 9/30/24 by Dr. Farias - mild Schatzki ring, small hiatal hernia, normal stomach and duodenum. Esophageal biopsies - mild chronic inflammation. Poor prep for colon polyp.     Patient presents to the office for  constipation. She has been struggling with constipation after recent medication changes. Failed Miralax, OTC stool softeners, and magnesium supplement. Currently taking magnesium supplement daily which provides a small incomplete bowel movement daily or every other day. Denies melena and hematochezia.     Past Medical History:   Diagnosis Date    Acid reflux     Anemia, unspecified     Cancer August 2022    Skin cancer on face    Chronic allergic rhinitis     Condition not found     Ulcer    Corns and callus     Diabetes     Unspecified    Diarrhea 04/15/2014    Elevated cholesterol     GERD (gastroesophageal reflux disease)     Gout 02/13/2023    Possible gout, not sure    HBP (high blood pressure)     High cholesterol     HTN (hypertension)     Hyperlipemia     Hypothyroidism     Ingrown toenail     Leg pain     Lumbar disc herniation 11/22/2013    L3-4 very small fat lat disc    Lumbar pain     Neuropathy in diabetes 2021    Numbness in feet     Renal insufficiency 03/16/2021    Sciatica 11/22/2013    no sig structural compressive lesion    Seasonal allergies     Sleep apnea     Stomach ulcer     Thyroid disorder        Past Surgical History:   Procedure Laterality Date    COLONOSCOPY      COLONOSCOPY N/A 09/30/2024    Procedure: COLONOSCOPY FOR SCREENING;  Surgeon: Oswald Farias MD;  Location: [facility] ENDOSCOPY;  Service: Gastroenterology;  Laterality: N/A;  INCOMPLETE COLONOSCOPY WITH  POOR PREP    ENDOSCOPY      ENDOSCOPY N/A 09/30/2024    Procedure: ESOPHAGOGASTRODUODENOSCOPY  WITH BALLOON DILATION OF ESOPHAGUS;  Surgeon: Oswald Farias MD;  Location: [facility] ENDOSCOPY;  Service: Gastroenterology;  Laterality: N/A;  HIATAL HERNIA, SCHATZSKI'S RING    JOINT REPLACEMENT      SHOULDER ACROMIOCLAVICULAR JOINT REPAIR      SHOULDER SURGERY Right 10/2010    TUBAL ABDOMINAL LIGATION      UPPER GASTROINTESTINAL ENDOSCOPY           Current Outpatient Medications:     allopurinol (ZYLOPRIM) 100 MG tablet, Take 2 tablets by mouth Daily for 180 days., Disp: 180 tablet, Rfl: 1    azelaic acid (AZELEX) 15 % gel, , Disp: , Rfl:     cetirizine (zyrTEC) 10 MG tablet, Take 1 tablet by mouth Every Night., Disp: 90 tablet, Rfl: 1    DULoxetine (CYMBALTA) 60 MG capsule, Take 1 capsule by mouth 2 (Two) Times a Day., Disp: 180 capsule, Rfl: 1    famotidine (Pepcid) 20 MG tablet, Take 1 tablet by mouth 2 (Two) Times a Day., Disp: 180 tablet, Rfl: 1    glucose blood (FREESTYLE LITE) test strip, Use one test strip daily to check blood sugar levels., Disp: 100 each, Rfl: 2    ketoconazole (NIZORAL) 2 % shampoo, , Disp: , Rfl:     Lancets (freestyle) lancets, 1 each by Other route 2 (Two) Times a Day., Disp: 100 each, Rfl: 3    levothyroxine (Synthroid) 50 MCG tablet, Take 1 tablet by mouth Daily., Disp: 90 tablet, Rfl: 1    Tirzepatide (Mounjaro) 5 MG/0.5ML solution auto-injector, Inject 5 mg under the skin into the appropriate area as directed Every 7 (Seven) Days., Disp: 2 mL, Rfl: 1    tolterodine LA (Detrol LA) 2 MG 24 hr capsule, Take 1 capsule by mouth Daily., Disp: 90 capsule, Rfl: 1    vitamin D (ERGOCALCIFEROL) 1.25 MG (37954 UT) capsule capsule, Take 1 capsule by mouth 1 (One) Time Per Week., Disp: 13 capsule, Rfl: 1    docusate sodium (Colace) 100 MG capsule, Take 1 capsule by mouth 2 (Two) Times a Day. (Patient not taking: Reported on 11/14/2024), Disp: 180 capsule, Rfl: 1    rosuvastatin (Crestor) 40 MG tablet, Take 1 tablet by mouth Every Night. (Patient not taking: Reported on 11/14/2024), Disp:  "90 tablet, Rfl: 1     Allergies   Allergen Reactions    Cephalexin Hives    Latex Itching    Penicillins Hives, Rash and Itching     Other Reaction(s): Eruption of skin, Unknown, Unknown, Eruption of skin, Unknown, Eruption of skin      PER PT Updated using clean up process. Changed reactant from AMPICILLIN (ingredient) to AMPICILLIN(file - PSNDF(50.6,)       Family History   Problem Relation Age of Onset    Cancer Mother         unspecified    Lung cancer Mother     Diabetes Father         unspecified type    Prostate cancer Father     Diabetes Paternal Aunt         unspecified type    Diabetes Paternal Uncle         unspecified type    Diabetes Paternal Grandmother         unspecified type    Heart attack Other     Diabetes Other     Colon cancer Neg Hx         Social History     Social History Narrative    Lives with spouse    Caffeine- drinks regularly; 3-4 times per day       Objective       Vital Signs:   /75 (BP Location: Left arm, Patient Position: Sitting, Cuff Size: Adult)   Pulse 95   Ht 162.6 cm (64.02\")   Wt 82.1 kg (181 lb)   SpO2 100%   BMI 31.05 kg/m²       Physical Exam  Constitutional:       Appearance: Normal appearance. She is normal weight.   HENT:      Head: Normocephalic and atraumatic.      Nose: Nose normal.   Pulmonary:      Effort: Pulmonary effort is normal.   Skin:     General: Skin is warm and dry.   Neurological:      Mental Status: She is alert and oriented to person, place, and time. Mental status is at baseline.   Psychiatric:         Mood and Affect: Mood normal.         Behavior: Behavior normal.         Thought Content: Thought content normal.         Judgment: Judgment normal.         Result Review :       CBC w/diff          5/20/2024    12:56 8/16/2024    11:24   CBC w/Diff   WBC 6.78  6.80    RBC 5.27  5.17    Hemoglobin 13.2  12.8    Hematocrit 42.2  41.0    MCV 80.1  79.3    MCH 25.0  24.8    MCHC 31.3  31.2    RDW 15.2  15.6    Platelets 277  288    Neutrophil " Rel % 58.3  61.7    Immature Granulocyte Rel % 0.6  0.7    Lymphocyte Rel % 22.1  20.1    Monocyte Rel % 12.8  10.7    Eosinophil Rel % 5.3  5.9    Basophil Rel % 0.9  0.9      CMP          6/18/2024    11:41 7/17/2024    12:36 8/16/2024    11:24   CMP   Glucose 111  122  130    BUN 22  16  20    Creatinine 1.93  1.32  1.27    EGFR 27.8  43.8  45.9    Sodium 138  139  138    Potassium 4.4  5.1  4.1    Chloride 103  103  102    Calcium 9.6  9.6  9.7    Total Protein 6.9  6.7  6.9    Albumin 4.1  4.1  4.5    Globulin 2.8  2.6  2.4    Total Bilirubin 0.4  0.4  0.3    Alkaline Phosphatase 141  107  106    AST (SGOT) 19  21  23    ALT (SGPT) 26  17  23    Albumin/Globulin Ratio 1.5  1.6  1.9    BUN/Creatinine Ratio 11.4  12.1  15.7    Anion Gap 10.4  7.0  10.0                    Assessment and Plan    Diagnoses and all orders for this visit:    1. Drug-induced constipation (Primary)    2. Screening for malignant neoplasm of colon    Continue magnesium supplement. Add Miralax. Educated patient on how to titrate  She will call if ineffective and will consider Linzess 72 at that time.   Plan to call patient 2 weeks prior to scheduled colonoscopy to reassess constipation  Cardiac clearance from Dr. George  COLONOSCOPY Surgical Risk and Benefits: Possible risk/complications, benefits, and alternatives to surgical or invasive procedure have been explained to patient and/or legal guardian. Risks include bleeding, infection, and perforation. Patient has been evaluated and can tolerate anesthesia and/or sedation. Risk, benefits, and alternatives to anesthesia and sedation have been explained to patient and/or legal guardian.     Follow Up   No follow-ups on file.  Patient was given instructions and counseling regarding her condition or for health maintenance advice. Please see specific information pulled into the AVS if appropriate.

## 2024-11-18 ENCOUNTER — TELEPHONE (OUTPATIENT)
Dept: GASTROENTEROLOGY | Facility: CLINIC | Age: 69
End: 2024-11-18
Payer: MEDICARE

## 2024-11-19 ENCOUNTER — LAB (OUTPATIENT)
Dept: LAB | Facility: HOSPITAL | Age: 69
End: 2024-11-19
Payer: MEDICARE

## 2024-11-19 DIAGNOSIS — E11.9 TYPE 2 DIABETES MELLITUS WITHOUT COMPLICATION, WITHOUT LONG-TERM CURRENT USE OF INSULIN: ICD-10-CM

## 2024-11-19 DIAGNOSIS — E78.2 MIXED HYPERLIPIDEMIA: ICD-10-CM

## 2024-11-19 DIAGNOSIS — E03.9 ACQUIRED HYPOTHYROIDISM: ICD-10-CM

## 2024-11-19 DIAGNOSIS — N18.30 STAGE 3 CHRONIC KIDNEY DISEASE, UNSPECIFIED WHETHER STAGE 3A OR 3B CKD: ICD-10-CM

## 2024-11-19 LAB
ALBUMIN SERPL-MCNC: 4.2 G/DL (ref 3.5–5.2)
ALBUMIN/GLOB SERPL: 1.7 G/DL
ALP SERPL-CCNC: 85 U/L (ref 39–117)
ALT SERPL W P-5'-P-CCNC: 29 U/L (ref 1–33)
ANION GAP SERPL CALCULATED.3IONS-SCNC: 13 MMOL/L (ref 5–15)
AST SERPL-CCNC: 22 U/L (ref 1–32)
BASOPHILS # BLD AUTO: 0.03 10*3/MM3 (ref 0–0.2)
BASOPHILS NFR BLD AUTO: 0.5 % (ref 0–1.5)
BILIRUB SERPL-MCNC: 0.5 MG/DL (ref 0–1.2)
BUN SERPL-MCNC: 25 MG/DL (ref 8–23)
BUN/CREAT SERPL: 18.2 (ref 7–25)
CALCIUM SPEC-SCNC: 10 MG/DL (ref 8.6–10.5)
CHLORIDE SERPL-SCNC: 101 MMOL/L (ref 98–107)
CHOLEST SERPL-MCNC: 260 MG/DL (ref 0–200)
CO2 SERPL-SCNC: 27 MMOL/L (ref 22–29)
CREAT SERPL-MCNC: 1.37 MG/DL (ref 0.57–1)
DEPRECATED RDW RBC AUTO: 43.9 FL (ref 37–54)
EGFRCR SERPLBLD CKD-EPI 2021: 41.9 ML/MIN/1.73
EOSINOPHIL # BLD AUTO: 0.15 10*3/MM3 (ref 0–0.4)
EOSINOPHIL NFR BLD AUTO: 2.6 % (ref 0.3–6.2)
ERYTHROCYTE [DISTWIDTH] IN BLOOD BY AUTOMATED COUNT: 15.5 % (ref 12.3–15.4)
GLOBULIN UR ELPH-MCNC: 2.5 GM/DL
GLUCOSE SERPL-MCNC: 49 MG/DL (ref 65–99)
HBA1C MFR BLD: 6.1 % (ref 4.8–5.6)
HCT VFR BLD AUTO: 43.6 % (ref 34–46.6)
HDLC SERPL-MCNC: 47 MG/DL (ref 40–60)
HGB BLD-MCNC: 13.7 G/DL (ref 12–15.9)
IMM GRANULOCYTES # BLD AUTO: 0.02 10*3/MM3 (ref 0–0.05)
IMM GRANULOCYTES NFR BLD AUTO: 0.3 % (ref 0–0.5)
LDLC SERPL CALC-MCNC: 188 MG/DL (ref 0–100)
LDLC/HDLC SERPL: 3.95 {RATIO}
LYMPHOCYTES # BLD AUTO: 1.17 10*3/MM3 (ref 0.7–3.1)
LYMPHOCYTES NFR BLD AUTO: 20.3 % (ref 19.6–45.3)
MCH RBC QN AUTO: 24.9 PG (ref 26.6–33)
MCHC RBC AUTO-ENTMCNC: 31.4 G/DL (ref 31.5–35.7)
MCV RBC AUTO: 79.1 FL (ref 79–97)
MONOCYTES # BLD AUTO: 0.72 10*3/MM3 (ref 0.1–0.9)
MONOCYTES NFR BLD AUTO: 12.5 % (ref 5–12)
NEUTROPHILS NFR BLD AUTO: 3.67 10*3/MM3 (ref 1.7–7)
NEUTROPHILS NFR BLD AUTO: 63.8 % (ref 42.7–76)
NRBC BLD AUTO-RTO: 0 /100 WBC (ref 0–0.2)
PLATELET # BLD AUTO: 272 10*3/MM3 (ref 140–450)
PMV BLD AUTO: 10.2 FL (ref 6–12)
POTASSIUM SERPL-SCNC: 4.4 MMOL/L (ref 3.5–5.2)
PROT SERPL-MCNC: 6.7 G/DL (ref 6–8.5)
RBC # BLD AUTO: 5.51 10*6/MM3 (ref 3.77–5.28)
SODIUM SERPL-SCNC: 141 MMOL/L (ref 136–145)
TRIGL SERPL-MCNC: 137 MG/DL (ref 0–150)
TSH SERPL DL<=0.05 MIU/L-ACNC: 1.84 UIU/ML (ref 0.27–4.2)
VLDLC SERPL-MCNC: 25 MG/DL (ref 5–40)
WBC NRBC COR # BLD AUTO: 5.76 10*3/MM3 (ref 3.4–10.8)

## 2024-11-19 PROCEDURE — 80053 COMPREHEN METABOLIC PANEL: CPT

## 2024-11-19 PROCEDURE — 84443 ASSAY THYROID STIM HORMONE: CPT

## 2024-11-19 PROCEDURE — 85025 COMPLETE CBC W/AUTO DIFF WBC: CPT

## 2024-11-19 PROCEDURE — 80061 LIPID PANEL: CPT

## 2024-11-19 PROCEDURE — 84550 ASSAY OF BLOOD/URIC ACID: CPT | Performed by: NURSE PRACTITIONER

## 2024-11-19 PROCEDURE — 83036 HEMOGLOBIN GLYCOSYLATED A1C: CPT

## 2024-11-19 NOTE — PROGRESS NOTES
Follow Up Office Visit      Patient Name: Vickie Bull  : 1955   MRN: [id number]     Chief Complaint:    Chief Complaint   Patient presents with    Anemia    Hyperlipidemia    Hypertension    Diabetes    Hypothyroidism    Heartburn    Chronic Kidney Disease       History of Present Illness: Vickie Bull is a 68 y.o. female who is here today to follow up for DM2, hyperlipidemia, HTN, hypothyroidism, CKD      A1C-2024  Eye exam- Dr Fields  Foot exam- Dr Balderrama. 2023  mammogram-2024  dexa-2023  Pap-  Dr Miller  Colonoscopy-2024  EGD 2020    C/o For the past 3 months or so she has been dried out and dry mouth. She said her colonoscopy with Dr. Farias said inadequate prep. She didn't have a bowel movement. She drinks 3 bottles of water a day.      Subjective      Review of Systems:   Review of Systems     Past Medical History:   Past Medical History:   Diagnosis Date    Acid reflux     Anemia, unspecified     Cancer 2022    Skin cancer on face    Chronic allergic rhinitis     Condition not found     Ulcer    Corns and callus     Diabetes     Unspecified    Diarrhea 04/15/2014    Elevated cholesterol     GERD (gastroesophageal reflux disease)     Gout 2023    Possible gout, not sure    HBP (high blood pressure)     High cholesterol     HTN (hypertension)     Hyperlipemia     Hypothyroidism     Ingrown toenail     Leg pain     Lumbar disc herniation 2013    L3-4 very small fat lat disc    Lumbar pain     Neuropathy in diabetes     Numbness in feet     Renal insufficiency 2021    Sciatica 2013    no sig structural compressive lesion    Seasonal allergies     Sleep apnea     Stomach ulcer     Thyroid disorder        Past Surgical History:   Past Surgical History:   Procedure Laterality Date    COLONOSCOPY      COLONOSCOPY N/A 2024    Procedure: COLONOSCOPY FOR SCREENING;  Surgeon: Oswald Farias MD;  Location: [facility] ENDOSCOPY;   Service: Gastroenterology;  Laterality: N/A;  INCOMPLETE COLONOSCOPY WITH  POOR PREP    ENDOSCOPY      ENDOSCOPY N/A 09/30/2024    Procedure: ESOPHAGOGASTRODUODENOSCOPY WITH BALLOON DILATION OF ESOPHAGUS;  Surgeon: Oswald Farisa MD;  Location: [facility] ENDOSCOPY;  Service: Gastroenterology;  Laterality: N/A;  HIATAL HERNIA, SCHATZSKI'S RING    JOINT REPLACEMENT      SHOULDER ACROMIOCLAVICULAR JOINT REPAIR      SHOULDER SURGERY Right 10/2010    TUBAL ABDOMINAL LIGATION      UPPER GASTROINTESTINAL ENDOSCOPY         Family History:   Family History   Problem Relation Age of Onset    Cancer Mother         unspecified    Lung cancer Mother     Diabetes Father         unspecified type    Prostate cancer Father     Diabetes Paternal Aunt         unspecified type    Diabetes Paternal Uncle         unspecified type    Diabetes Paternal Grandmother         unspecified type    Heart attack Other     Diabetes Other     Colon cancer Neg Hx        Social History:   Social History     Socioeconomic History    Marital status:    Tobacco Use    Smoking status: Never     Passive exposure: Never    Smokeless tobacco: Never    Tobacco comments:     second hand smoke exposure-never   Vaping Use    Vaping status: Never Used   Substance and Sexual Activity    Alcohol use: Yes     Comment: Rarely    Drug use: Never    Sexual activity: Yes     Partners: Male       Medications:     Current Outpatient Medications:     allopurinol (ZYLOPRIM) 100 MG tablet, Take 2 tablets by mouth Daily for 180 days., Disp: 180 tablet, Rfl: 1    azelaic acid (AZELEX) 15 % gel, , Disp: , Rfl:     cetirizine (zyrTEC) 10 MG tablet, Take 1 tablet by mouth Every Night., Disp: 90 tablet, Rfl: 1    DULoxetine (CYMBALTA) 60 MG capsule, Take 1 capsule by mouth 2 (Two) Times a Day., Disp: 180 capsule, Rfl: 1    famotidine (Pepcid) 20 MG tablet, Take 1 tablet by mouth 2 (Two) Times a Day., Disp: 180 tablet, Rfl: 1    glucose blood (FREESTYLE LITE) test strip,  "Use one test strip daily to check blood sugar levels., Disp: 100 each, Rfl: 2    ketoconazole (NIZORAL) 2 % shampoo, , Disp: , Rfl:     Lancets (freestyle) lancets, 1 each by Other route 2 (Two) Times a Day., Disp: 100 each, Rfl: 3    levothyroxine (Synthroid) 50 MCG tablet, Take 1 tablet by mouth Daily., Disp: 90 tablet, Rfl: 1    rosuvastatin (Crestor) 40 MG tablet, Take 1 tablet by mouth Every Night., Disp: 90 tablet, Rfl: 1    Tirzepatide (Mounjaro) 5 MG/0.5ML solution auto-injector, Inject 5 mg under the skin into the appropriate area as directed Every 7 (Seven) Days., Disp: 2 mL, Rfl: 1    tolterodine LA (Detrol LA) 2 MG 24 hr capsule, Take 1 capsule by mouth Daily., Disp: 90 capsule, Rfl: 1    vitamin D (ERGOCALCIFEROL) 1.25 MG (94075 UT) capsule capsule, Take 1 capsule by mouth 1 (One) Time Per Week., Disp: 13 capsule, Rfl: 1    Allergies:   Allergies   Allergen Reactions    Cephalexin Hives    Latex Itching    Penicillins Hives, Rash and Itching     Other Reaction(s): Eruption of skin, Unknown, Unknown, Eruption of skin, Unknown, Eruption of skin      PER PT Updated using clean up process. Changed reactant from AMPICILLIN (ingredient) to AMPICILLIN(file - PSNDF(50.6,)           PHQ-2 Total Score:     PHQ-9 Total Score:       Objective     Physical Exam:  Vital Signs:   Vitals:    11/20/24 1353   Height: 162.6 cm (64.02\")     Body mass index is 31.05 kg/m².           Physical Exam    Procedures     Assessment / Plan      Assessment/Plan:   Diagnoses and all orders for this visit:    1. Type 2 diabetes mellitus without complication, with long-term current use of insulin (Primary)    2. Primary hypertension    3. Mixed hyperlipidemia    4. Acquired hypothyroidism    5. Vitamin D deficiency  -     Vitamin D,25-Hydroxy    6. Stage 3 chronic kidney disease, unspecified whether stage 3a or 3b CKD    7. Seasonal allergic rhinitis, unspecified trigger    8. Gastroesophageal reflux disease without esophagitis    9. " "Gout, unspecified cause, unspecified chronicity, unspecified site    10. Drug-induced constipation    11. Screening mammogram for breast cancer  -     Mammo Screening Digital Tomosynthesis Bilateral With CAD; Future    12. Post-menopausal  -     DEXA Bone Density Axial; Future    13. Type 2 diabetes mellitus without complication, without long-term current use of insulin               Follow Up:   No follow-ups on file.    Erwin Ascencio, APRN    \"Please note that portions of this note were completed with a voice recognition program.\"    "

## 2024-11-19 NOTE — PROGRESS NOTES
Follow Up Office Visit      Patient Name: Vickie Bull  : 1955   MRN: [id number]     Chief Complaint:    Chief Complaint   Patient presents with    Anemia    Hyperlipidemia    Hypertension    Diabetes    Hypothyroidism    Heartburn    Chronic Kidney Disease       History of Present Illness: Vickie Bull is a 68 y.o. female who is here today to follow up for follow up for DM2, hyperlipidemia, HTN, hypothyroidism, CKD   Review lab results     Pt reports she stopped crestor for 2 weeks to see if that helped her thrist, but states this did not help her thirst and she is only drinking maybe 36 ounces of water per day      A1C-2024    Follow up increase dose of mounjaro 5 mg once weekly patient reports tolerating well  Eye exam- Dr Fields  Foot exam- Dr Balderrama. 2023  mammogram-2024  dexa-2023  Pap-  Dr Miller  Colonoscopy-2019 normal   EGD       Follow up gi consult 2024 per progress note  1. Drug-induced constipation (Primary)     2. Screening for malignant neoplasm of colon     Continue magnesium supplement. Add Miralax. Educated patient on how to titrate  She will call if ineffective and will consider Linzess 72 at that time.   Plan to call patient 2 weeks prior to scheduled colonoscopy to reassess constipation  Cardiac clearance from Dr. George    Subjective      Review of Systems:   Review of Systems   HENT:  Negative for ear pain and sore throat.    Eyes:  Negative for visual disturbance.   Respiratory:  Negative for cough.    Cardiovascular:  Negative for chest pain.   Gastrointestinal:  Negative for abdominal pain, constipation, diarrhea, nausea and vomiting.   Genitourinary:  Negative for dysuria.   Musculoskeletal:  Negative for myalgias.   Psychiatric/Behavioral:  Negative for confusion.         Past Medical History:   Past Medical History:   Diagnosis Date    Acid reflux     Anemia, unspecified     Cancer 2022    Skin cancer on face    Chronic  allergic rhinitis     Condition not found     Ulcer    Corns and callus     Diabetes     Unspecified    Diarrhea 04/15/2014    Elevated cholesterol     GERD (gastroesophageal reflux disease)     Gout 02/13/2023    Possible gout, not sure    HBP (high blood pressure)     High cholesterol     HTN (hypertension)     Hyperlipemia     Hypothyroidism     Ingrown toenail     Leg pain     Lumbar disc herniation 11/22/2013    L3-4 very small fat lat disc    Lumbar pain     Neuropathy in diabetes 2021    Numbness in feet     Renal insufficiency 03/16/2021    Sciatica 11/22/2013    no sig structural compressive lesion    Seasonal allergies     Sleep apnea     Stomach ulcer     Thyroid disorder        Past Surgical History:   Past Surgical History:   Procedure Laterality Date    COLONOSCOPY      COLONOSCOPY N/A 09/30/2024    Procedure: COLONOSCOPY FOR SCREENING;  Surgeon: Oswald Farias MD;  Location: [facility] ENDOSCOPY;  Service: Gastroenterology;  Laterality: N/A;  INCOMPLETE COLONOSCOPY WITH  POOR PREP    ENDOSCOPY      ENDOSCOPY N/A 09/30/2024    Procedure: ESOPHAGOGASTRODUODENOSCOPY WITH BALLOON DILATION OF ESOPHAGUS;  Surgeon: Oswald Farias MD;  Location: [facility] ENDOSCOPY;  Service: Gastroenterology;  Laterality: N/A;  HIATAL HERNIA, SCHATZSKI'S RING    JOINT REPLACEMENT      SHOULDER ACROMIOCLAVICULAR JOINT REPAIR      SHOULDER SURGERY Right 10/2010    TUBAL ABDOMINAL LIGATION      UPPER GASTROINTESTINAL ENDOSCOPY         Family History:   Family History   Problem Relation Age of Onset    Cancer Mother         unspecified    Lung cancer Mother     Diabetes Father         unspecified type    Prostate cancer Father     Diabetes Paternal Aunt         unspecified type    Diabetes Paternal Uncle         unspecified type    Diabetes Paternal Grandmother         unspecified type    Heart attack Other     Diabetes Other     Colon cancer Neg Hx        Social History:   Social History     Socioeconomic History     Marital status:    Tobacco Use    Smoking status: Never     Passive exposure: Never    Smokeless tobacco: Never    Tobacco comments:     second hand smoke exposure-never   Vaping Use    Vaping status: Never Used   Substance and Sexual Activity    Alcohol use: Yes     Comment: Rarely    Drug use: Never    Sexual activity: Yes     Partners: Male       Medications:     Current Outpatient Medications:     allopurinol (ZYLOPRIM) 100 MG tablet, Take 2 tablets by mouth Daily for 180 days., Disp: 180 tablet, Rfl: 1    azelaic acid (AZELEX) 15 % gel, , Disp: , Rfl:     cetirizine (zyrTEC) 10 MG tablet, Take 1 tablet by mouth Every Night., Disp: 90 tablet, Rfl: 1    DULoxetine (CYMBALTA) 60 MG capsule, Take 1 capsule by mouth 2 (Two) Times a Day., Disp: 180 capsule, Rfl: 1    famotidine (Pepcid) 20 MG tablet, Take 1 tablet by mouth 2 (Two) Times a Day., Disp: 180 tablet, Rfl: 1    glucose blood (FREESTYLE LITE) test strip, Use one test strip daily to check blood sugar levels., Disp: 100 each, Rfl: 2    ketoconazole (NIZORAL) 2 % shampoo, , Disp: , Rfl:     Lancets (freestyle) lancets, 1 each by Other route 2 (Two) Times a Day., Disp: 100 each, Rfl: 3    levothyroxine (Synthroid) 50 MCG tablet, Take 1 tablet by mouth Daily., Disp: 90 tablet, Rfl: 1    rosuvastatin (Crestor) 40 MG tablet, Take 1 tablet by mouth Every Night., Disp: 90 tablet, Rfl: 1    Tirzepatide (Mounjaro) 5 MG/0.5ML solution auto-injector, Inject 5 mg under the skin into the appropriate area as directed Every 7 (Seven) Days., Disp: 2 mL, Rfl: 5    tolterodine LA (Detrol LA) 2 MG 24 hr capsule, Take 1 capsule by mouth Daily., Disp: 90 capsule, Rfl: 1    vitamin D (ERGOCALCIFEROL) 1.25 MG (64624 UT) capsule capsule, Take 1 capsule by mouth 1 (One) Time Per Week., Disp: 13 capsule, Rfl: 1    Allergies:   Allergies   Allergen Reactions    Cephalexin Hives    Latex Itching    Penicillins Hives, Rash and Itching     Other Reaction(s): Eruption of skin,  "Unknown, Unknown, Eruption of skin, Unknown, Eruption of skin      PER PT Updated using clean up process. Changed reactant from AMPICILLIN (ingredient) to AMPICILLIN(file - PSNDF(50.6,)           Objective     Physical Exam:  Vital Signs:   Vitals:    11/20/24 1353   BP: 104/70   Pulse: 90   Temp: 97.5 °F (36.4 °C)   SpO2: 100%   Weight: 80.3 kg (177 lb)   Height: 162.6 cm (64.02\")     Body mass index is 30.36 kg/m².           Physical Exam  HENT:      Right Ear: Tympanic membrane normal.      Left Ear: Tympanic membrane normal.      Nose: Nose normal.      Mouth/Throat:      Mouth: Mucous membranes are moist.   Eyes:      Conjunctiva/sclera: Conjunctivae normal.   Neck:      Vascular: No carotid bruit.   Cardiovascular:      Rate and Rhythm: Normal rate and regular rhythm.      Heart sounds: Normal heart sounds. No murmur heard.  Pulmonary:      Effort: Pulmonary effort is normal.      Breath sounds: Normal breath sounds.   Abdominal:      General: Bowel sounds are normal.      Palpations: Abdomen is soft.   Musculoskeletal:      Right lower leg: No edema.      Left lower leg: No edema.   Skin:     General: Skin is warm and dry.   Neurological:      Mental Status: She is alert.   Psychiatric:         Mood and Affect: Mood normal.         Behavior: Behavior normal.             Assessment / Plan      Assessment/Plan:   Diagnoses and all orders for this visit:    1. Type 2 diabetes mellitus without complication, with long-term current use of insulin (Primary)  -     glucose blood (FREESTYLE LITE) test strip; Use one test strip daily to check blood sugar levels.  Dispense: 100 each; Refill: 2  -     CBC Auto Differential; Future  -     Comprehensive Metabolic Panel; Future  -     Microalbumin / Creatinine Urine Ratio - Urine, Clean Catch; Future  -     Hemoglobin A1c; Future  -     Urinalysis With Culture If Indicated -; Future    2. Primary hypertension    3. Mixed hyperlipidemia  -     Lipid Panel; Future  -     " Comprehensive Metabolic Panel; Future  -     Lipid Panel; Future    4. Stage 3 chronic kidney disease, unspecified whether stage 3a or 3b CKD    5. Acquired hypothyroidism  -     TSH; Future    6. Vitamin D deficiency  -     Vitamin D,25-Hydroxy  -     Vitamin D,25-Hydroxy; Future    7. Seasonal allergic rhinitis, unspecified trigger    8. Gastroesophageal reflux disease without esophagitis    9. Gout, unspecified cause, unspecified chronicity, unspecified site  -     Uric Acid    10. Drug-induced constipation    11. Mixed stress and urge urinary incontinence    12. Screening mammogram for breast cancer  -     Mammo Screening Digital Tomosynthesis Bilateral With CAD; Future    13. Post-menopausal  -     DEXA Bone Density Axial; Future    Other orders  -     allopurinol (ZYLOPRIM) 100 MG tablet; Take 2 tablets by mouth Daily for 180 days.  Dispense: 180 tablet; Refill: 1  -     cetirizine (zyrTEC) 10 MG tablet; Take 1 tablet by mouth Every Night.  Dispense: 90 tablet; Refill: 1  -     DULoxetine (CYMBALTA) 60 MG capsule; Take 1 capsule by mouth 2 (Two) Times a Day.  Dispense: 180 capsule; Refill: 1  -     famotidine (Pepcid) 20 MG tablet; Take 1 tablet by mouth 2 (Two) Times a Day.  Dispense: 180 tablet; Refill: 1  -     levothyroxine (Synthroid) 50 MCG tablet; Take 1 tablet by mouth Daily.  Dispense: 90 tablet; Refill: 1  -     rosuvastatin (Crestor) 40 MG tablet; Take 1 tablet by mouth Every Night.  Dispense: 90 tablet; Refill: 1  -     Tirzepatide (Mounjaro) 5 MG/0.5ML solution auto-injector; Inject 5 mg under the skin into the appropriate area as directed Every 7 (Seven) Days.  Dispense: 2 mL; Refill: 5  -     tolterodine LA (Detrol LA) 2 MG 24 hr capsule; Take 1 capsule by mouth Daily.  Dispense: 90 capsule; Refill: 1  -     vitamin D (ERGOCALCIFEROL) 1.25 MG (75189 UT) capsule capsule; Take 1 capsule by mouth 1 (One) Time Per Week.  Dispense: 13 capsule; Refill: 1         Diabetes mellitus type 2 currently  "controlled hemoglobin A1c below goal of 6.5 we will continue Mounjaro  Hypertension currently controlled at this time without medication  Hyperlipidemia LDL above goal patient stopped Crestor we will resume Crestor 40 mg nightly recheck CMP and lipid panel in 30 days to monitor  Hypothyroidism TSH within goal range on current Synthroid dose 50 mcg daily denies palpitations  Vitamin D deficiency continue 50,000 units once weekly  Chronic kidney disease stage III avoid all NSAIDs increase water intake stable at this time  Seasonal allergies currently controlled with Zyrtec  Gout no recent flares obtain uric acid level to monitor continue allopurinol at this time  Make stress and urge incontinence symptoms currently controlled with Detrol LA  Drug-induced constipation currently using MiraLAX as directed do recommend adding exercise 30 minutes daily and increasing water intake    Postmenopausal obtain bone density monitor for osteoporosis      Follow Up:   Return in about 6 months (around 5/20/2025).    Erwin Ascencio, HANS    \"Please note that portions of this note were completed with a voice recognition program.\"    "

## 2024-11-20 ENCOUNTER — OFFICE VISIT (OUTPATIENT)
Dept: FAMILY MEDICINE CLINIC | Facility: CLINIC | Age: 69
End: 2024-11-20
Payer: MEDICARE

## 2024-11-20 VITALS
WEIGHT: 177 LBS | OXYGEN SATURATION: 100 % | DIASTOLIC BLOOD PRESSURE: 70 MMHG | BODY MASS INDEX: 30.22 KG/M2 | HEART RATE: 90 BPM | SYSTOLIC BLOOD PRESSURE: 104 MMHG | TEMPERATURE: 97.5 F | HEIGHT: 64 IN

## 2024-11-20 DIAGNOSIS — M10.9 GOUT, UNSPECIFIED CAUSE, UNSPECIFIED CHRONICITY, UNSPECIFIED SITE: ICD-10-CM

## 2024-11-20 DIAGNOSIS — E78.2 MIXED HYPERLIPIDEMIA: ICD-10-CM

## 2024-11-20 DIAGNOSIS — N18.30 STAGE 3 CHRONIC KIDNEY DISEASE, UNSPECIFIED WHETHER STAGE 3A OR 3B CKD: ICD-10-CM

## 2024-11-20 DIAGNOSIS — E03.9 ACQUIRED HYPOTHYROIDISM: ICD-10-CM

## 2024-11-20 DIAGNOSIS — J30.2 SEASONAL ALLERGIC RHINITIS, UNSPECIFIED TRIGGER: ICD-10-CM

## 2024-11-20 DIAGNOSIS — I10 PRIMARY HYPERTENSION: ICD-10-CM

## 2024-11-20 DIAGNOSIS — Z79.4 TYPE 2 DIABETES MELLITUS WITHOUT COMPLICATION, WITH LONG-TERM CURRENT USE OF INSULIN: Primary | ICD-10-CM

## 2024-11-20 DIAGNOSIS — Z78.0 POST-MENOPAUSAL: ICD-10-CM

## 2024-11-20 DIAGNOSIS — E55.9 VITAMIN D DEFICIENCY: ICD-10-CM

## 2024-11-20 DIAGNOSIS — K21.9 GASTROESOPHAGEAL REFLUX DISEASE WITHOUT ESOPHAGITIS: ICD-10-CM

## 2024-11-20 DIAGNOSIS — E11.9 TYPE 2 DIABETES MELLITUS WITHOUT COMPLICATION, WITH LONG-TERM CURRENT USE OF INSULIN: Primary | ICD-10-CM

## 2024-11-20 DIAGNOSIS — N39.46 MIXED STRESS AND URGE URINARY INCONTINENCE: ICD-10-CM

## 2024-11-20 DIAGNOSIS — Z12.31 SCREENING MAMMOGRAM FOR BREAST CANCER: ICD-10-CM

## 2024-11-20 DIAGNOSIS — K59.03 DRUG-INDUCED CONSTIPATION: ICD-10-CM

## 2024-11-20 LAB — URATE SERPL-MCNC: 4.4 MG/DL (ref 2.4–5.7)

## 2024-11-20 RX ORDER — CETIRIZINE HYDROCHLORIDE 10 MG/1
10 TABLET ORAL NIGHTLY
Qty: 90 TABLET | Refills: 1 | Status: SHIPPED | OUTPATIENT
Start: 2024-11-20

## 2024-11-20 RX ORDER — ALLOPURINOL 100 MG/1
200 TABLET ORAL DAILY
Qty: 180 TABLET | Refills: 1 | Status: SHIPPED | OUTPATIENT
Start: 2024-11-20 | End: 2025-05-19

## 2024-11-20 RX ORDER — TOLTERODINE 2 MG/1
2 CAPSULE, EXTENDED RELEASE ORAL DAILY
Qty: 90 CAPSULE | Refills: 1 | Status: SHIPPED | OUTPATIENT
Start: 2024-11-20

## 2024-11-20 RX ORDER — TIRZEPATIDE 5 MG/.5ML
5 INJECTION, SOLUTION SUBCUTANEOUS
Qty: 2 ML | Refills: 5 | Status: SHIPPED | OUTPATIENT
Start: 2024-11-20

## 2024-11-20 RX ORDER — BLOOD-GLUCOSE METER
KIT MISCELLANEOUS
Qty: 100 EACH | Refills: 2 | Status: SHIPPED | OUTPATIENT
Start: 2024-11-20

## 2024-11-20 RX ORDER — DULOXETIN HYDROCHLORIDE 60 MG/1
60 CAPSULE, DELAYED RELEASE ORAL 2 TIMES DAILY
Qty: 180 CAPSULE | Refills: 1 | Status: SHIPPED | OUTPATIENT
Start: 2024-11-20

## 2024-11-20 RX ORDER — ROSUVASTATIN CALCIUM 40 MG/1
40 TABLET, COATED ORAL NIGHTLY
Qty: 90 TABLET | Refills: 1 | Status: SHIPPED | OUTPATIENT
Start: 2024-11-20

## 2024-11-20 RX ORDER — FAMOTIDINE 20 MG/1
20 TABLET, FILM COATED ORAL 2 TIMES DAILY
Qty: 180 TABLET | Refills: 1 | Status: SHIPPED | OUTPATIENT
Start: 2024-11-20

## 2024-11-20 RX ORDER — ERGOCALCIFEROL 1.25 MG/1
50000 CAPSULE, LIQUID FILLED ORAL WEEKLY
Qty: 13 CAPSULE | Refills: 1 | Status: SHIPPED | OUTPATIENT
Start: 2024-11-20

## 2024-11-20 RX ORDER — LEVOTHYROXINE SODIUM 50 UG/1
50 TABLET ORAL DAILY
Qty: 90 TABLET | Refills: 1 | Status: SHIPPED | OUTPATIENT
Start: 2024-11-20

## 2024-12-23 ENCOUNTER — LAB (OUTPATIENT)
Dept: LAB | Facility: HOSPITAL | Age: 69
End: 2024-12-23
Payer: MEDICARE

## 2024-12-23 DIAGNOSIS — E78.2 MIXED HYPERLIPIDEMIA: ICD-10-CM

## 2024-12-23 LAB
25(OH)D3 SERPL-MCNC: 83.1 NG/ML (ref 30–100)
ALBUMIN SERPL-MCNC: 4.2 G/DL (ref 3.5–5.2)
ALBUMIN/GLOB SERPL: 1.6 G/DL
ALP SERPL-CCNC: 100 U/L (ref 39–117)
ALT SERPL W P-5'-P-CCNC: 38 U/L (ref 1–33)
ANION GAP SERPL CALCULATED.3IONS-SCNC: 10 MMOL/L (ref 5–15)
AST SERPL-CCNC: 37 U/L (ref 1–32)
BILIRUB SERPL-MCNC: 0.4 MG/DL (ref 0–1.2)
BUN SERPL-MCNC: 15 MG/DL (ref 8–23)
BUN/CREAT SERPL: 10.3 (ref 7–25)
CALCIUM SPEC-SCNC: 9.8 MG/DL (ref 8.6–10.5)
CHLORIDE SERPL-SCNC: 106 MMOL/L (ref 98–107)
CHOLEST SERPL-MCNC: 157 MG/DL (ref 0–200)
CO2 SERPL-SCNC: 29 MMOL/L (ref 22–29)
CREAT SERPL-MCNC: 1.45 MG/DL (ref 0.57–1)
EGFRCR SERPLBLD CKD-EPI 2021: 38.9 ML/MIN/1.73
GLOBULIN UR ELPH-MCNC: 2.6 GM/DL
GLUCOSE SERPL-MCNC: 82 MG/DL (ref 65–99)
HDLC SERPL-MCNC: 66 MG/DL (ref 40–60)
LDLC SERPL CALC-MCNC: 71 MG/DL (ref 0–100)
LDLC/HDLC SERPL: 1.03 {RATIO}
POTASSIUM SERPL-SCNC: 4.7 MMOL/L (ref 3.5–5.2)
PROT SERPL-MCNC: 6.8 G/DL (ref 6–8.5)
SODIUM SERPL-SCNC: 145 MMOL/L (ref 136–145)
TRIGL SERPL-MCNC: 114 MG/DL (ref 0–150)
VLDLC SERPL-MCNC: 20 MG/DL (ref 5–40)

## 2024-12-23 PROCEDURE — 80053 COMPREHEN METABOLIC PANEL: CPT

## 2024-12-23 PROCEDURE — 82306 VITAMIN D 25 HYDROXY: CPT | Performed by: NURSE PRACTITIONER

## 2024-12-23 PROCEDURE — 80061 LIPID PANEL: CPT

## 2024-12-27 DIAGNOSIS — N28.9 KIDNEY FUNCTION ABNORMAL: Primary | ICD-10-CM

## 2025-01-02 RX ORDER — TIRZEPATIDE 5 MG/.5ML
5 INJECTION, SOLUTION SUBCUTANEOUS
Qty: 2 ML | Refills: 5 | Status: SHIPPED | OUTPATIENT
Start: 2025-01-02

## 2025-01-02 NOTE — TELEPHONE ENCOUNTER
Caller: Jorgito Vickie A    Relationship: Self    Best call back number: [phone]     Requested Prescriptions:   Requested Prescriptions     Pending Prescriptions Disp Refills    Tirzepatide (Mounjaro) 5 MG/0.5ML solution auto-injector 2 mL 5     Sig: Inject 5 mg under the skin into the appropriate area as directed Every 7 (Seven) Days.        Pharmacy where request should be sent: [facility] [id number] [place], [place] [id number] FX     Last office visit with prescribing clinician: 11/20/2024   Last telemedicine visit with prescribing clinician: Visit date not found   Next office visit with prescribing clinician: 5/20/2025       Does the patient have less than a 3 day supply:  [] Yes  [x] No    Would you like a call back once the refill request has been completed: [x] Yes [] No    If the office needs to give you a call back, can they leave a voicemail: [] Yes [] No    Leah Rivera Rep   01/02/25 12:29 EST

## 2025-01-13 ENCOUNTER — TELEPHONE (OUTPATIENT)
Dept: GASTROENTEROLOGY | Facility: CLINIC | Age: 70
End: 2025-01-13
Payer: MEDICARE

## 2025-01-13 NOTE — TELEPHONE ENCOUNTER
Called patient to check on constipation- per rhoda's last OV 11/14/2024.  Patient is currently having a BM every other day- every 3rd day.   She is currently using miralax every other day, but plans this week to use miralax 2x daily, low residue diet, and 2 days clear liquid diet prior to scope.     Patient stated she was thinking we was going to send in 4L bowel prep to [facility] pharmacy but I do not see it in chart, can you send this?

## 2025-01-13 NOTE — TELEPHONE ENCOUNTER
Yes I have sent to [facility], before I realized it the pharmacy loaded was [facility] so it went there first, [facility]

## 2025-01-15 NOTE — PRE-PROCEDURE INSTRUCTIONS
"Instructed on date and arrival time of 1030. Instructed that arrival time is not their procedure time but allows time to prepare for procedure.  Come to entrance \"C\". Must have  over age 18 to drive home.  May have two visitors; however, children under 12 must stay in waiting room.  Discussed clear liquid diet (no red or purple), bowel prep, and NPO.  May take medications as usual except for blood thinners, diabetic medications, and weight loss medications.  Verbalized understanding of instructions given.  Instructed to call for questions or concerns.  Hold Mounjaro for one week prior to procedure.  Cardiac clearance noted in chart.  "

## 2025-01-20 ENCOUNTER — ANESTHESIA EVENT (OUTPATIENT)
Dept: GASTROENTEROLOGY | Facility: HOSPITAL | Age: 70
End: 2025-01-20
Payer: MEDICARE

## 2025-01-20 NOTE — ANESTHESIA PREPROCEDURE EVALUATION
Anesthesia Evaluation     Patient summary reviewed and Nursing notes reviewed   NPO Solid Status: > 8 hours  NPO Liquid Status: > 4 hours           Airway   Mallampati: I  TM distance: >3 FB  Neck ROM: full  No difficulty expected  Dental - normal exam     Pulmonary - normal exam    breath sounds clear to auscultation  (+) ,sleep apnea  Cardiovascular - normal exam    ECG reviewed  Rhythm: regular  Rate: normal    (+) hypertension (no medication), hyperlipidemia      Neuro/Psych  (+) numbness (hx of thoracic and lumbar radiculopathy, sciatica and numbness in feet)  GI/Hepatic/Renal/Endo    (+) GERD, PUD, renal disease (stage 3 CKD)- CRI, diabetes mellitus type 2 well controlled using insulin, thyroid problem hypothyroidism    Musculoskeletal     Abdominal    Substance History      OB/GYN          Other   arthritis,   history of cancer (hx of skin cancer)    ROS/Med Hx Other: Pt on mounjaro last dose: 1/12/25    Per cardiology pt is low risk                    Anesthesia Plan    ASA 3     general   total IV anesthesia  (Patient understands anesthesia not responsible for dental damage. Risks explained including allergic reactions, BP, HR, O2 changes, aspiration, advanced airway placement. Pt verbalized understanding.)  intravenous induction     Anesthetic plan, risks, benefits, and alternatives have been provided, discussed and informed consent has been obtained with: patient.  Pre-procedure education provided  Use of blood products discussed with patient  Consented to blood products.    Plan discussed with CRNA.        CODE STATUS:

## 2025-01-21 ENCOUNTER — HOSPITAL ENCOUNTER (OUTPATIENT)
Facility: HOSPITAL | Age: 70
Setting detail: HOSPITAL OUTPATIENT SURGERY
Discharge: HOME | End: 2025-01-21
Attending: INTERNAL MEDICINE | Admitting: INTERNAL MEDICINE
Payer: MEDICARE

## 2025-01-21 ENCOUNTER — ANESTHESIA (OUTPATIENT)
Dept: GASTROENTEROLOGY | Facility: HOSPITAL | Age: 70
End: 2025-01-21
Payer: MEDICARE

## 2025-01-21 VITALS
HEART RATE: 73 BPM | SYSTOLIC BLOOD PRESSURE: 115 MMHG | RESPIRATION RATE: 15 BRPM | OXYGEN SATURATION: 98 % | TEMPERATURE: 97.2 F | DIASTOLIC BLOOD PRESSURE: 61 MMHG | BODY MASS INDEX: 29.23 KG/M2 | WEIGHT: 170.42 LBS

## 2025-01-21 LAB — GLUCOSE BLDC GLUCOMTR-MCNC: 81 MG/DL (ref 70–99)

## 2025-01-21 PROCEDURE — 25810000003 SODIUM CHLORIDE 0.9 % SOLUTION: Performed by: INTERNAL MEDICINE

## 2025-01-21 PROCEDURE — 25810000003 SODIUM CHLORIDE 0.9 % SOLUTION

## 2025-01-21 PROCEDURE — 25010000002 PROPOFOL 10 MG/ML EMULSION

## 2025-01-21 PROCEDURE — 82948 REAGENT STRIP/BLOOD GLUCOSE: CPT | Performed by: NURSE ANESTHETIST, CERTIFIED REGISTERED

## 2025-01-21 PROCEDURE — 25010000002 LIDOCAINE PF 2% 2 % SOLUTION

## 2025-01-21 PROCEDURE — G0121 COLON CA SCRN NOT HI RSK IND: HCPCS | Performed by: INTERNAL MEDICINE

## 2025-01-21 RX ORDER — SODIUM CHLORIDE 9 MG/ML
INJECTION, SOLUTION INTRAVENOUS CONTINUOUS PRN
Status: DISCONTINUED | OUTPATIENT
Start: 2025-01-21 | End: 2025-01-21 | Stop reason: SURG

## 2025-01-21 RX ORDER — PROPOFOL 10 MG/ML
VIAL (ML) INTRAVENOUS AS NEEDED
Status: DISCONTINUED | OUTPATIENT
Start: 2025-01-21 | End: 2025-01-21 | Stop reason: SURG

## 2025-01-21 RX ORDER — SODIUM CHLORIDE, SODIUM LACTATE, POTASSIUM CHLORIDE, CALCIUM CHLORIDE 600; 310; 30; 20 MG/100ML; MG/100ML; MG/100ML; MG/100ML
30 INJECTION, SOLUTION INTRAVENOUS CONTINUOUS
Status: DISCONTINUED | OUTPATIENT
Start: 2025-01-21 | End: 2025-01-21

## 2025-01-21 RX ORDER — LIDOCAINE HYDROCHLORIDE 20 MG/ML
INJECTION, SOLUTION EPIDURAL; INFILTRATION; INTRACAUDAL; PERINEURAL AS NEEDED
Status: DISCONTINUED | OUTPATIENT
Start: 2025-01-21 | End: 2025-01-21 | Stop reason: SURG

## 2025-01-21 RX ADMIN — PROPOFOL 175 MCG/KG/MIN: 10 INJECTION, EMULSION INTRAVENOUS at 15:24

## 2025-01-21 RX ADMIN — SODIUM CHLORIDE 1000 ML: 9 INJECTION, SOLUTION INTRAVENOUS at 12:39

## 2025-01-21 RX ADMIN — SODIUM CHLORIDE: 9 INJECTION, SOLUTION INTRAVENOUS at 15:20

## 2025-01-21 RX ADMIN — PROPOFOL 30 MG: 10 INJECTION, EMULSION INTRAVENOUS at 15:25

## 2025-01-21 RX ADMIN — LIDOCAINE HYDROCHLORIDE 40 MG: 20 INJECTION, SOLUTION INTRAVENOUS at 15:23

## 2025-01-21 RX ADMIN — PROPOFOL 60 MG: 10 INJECTION, EMULSION INTRAVENOUS at 15:23

## 2025-01-21 NOTE — ANESTHESIA POSTPROCEDURE EVALUATION
Patient: Vickie Bull    Procedure Summary       Date: 01/21/25 Room / Location: [facility] ENDOSCOPY 4 / [facility] END[facility]    Anesthesia Start: 1520 Anesthesia Stop: 1545    Procedure: COLONOSCOPY Diagnosis:       Screening for malignant neoplasm of colon      (Screening for malignant neoplasm of colon [Z12.11])    Surgeons: Oswald Farias MD Provider: Aspen Barriga CRNA    Anesthesia Type: general ASA Status: 3            Anesthesia Type: general    Vitals  Vitals Value Taken Time   /68 01/21/25 1556   Temp 36.4 °C (97.5 °F) 01/21/25 1546   Pulse 75 01/21/25 1559   Resp 16 01/21/25 1556   SpO2 100 % 01/21/25 1559   Vitals shown include unfiled device data.        Post Anesthesia Care and Evaluation    Patient location during evaluation: bedside  Patient participation: complete - patient participated  Level of consciousness: awake  Pain score: 0  Pain management: adequate    Airway patency: patent  Anesthetic complications: No anesthetic complications  PONV Status: controlled  Cardiovascular status: acceptable and stable  Respiratory status: acceptable  Hydration status: acceptable

## 2025-01-21 NOTE — H&P
Pre Procedure History & Physical    Chief Complaint:   Screening    Subjective     HPI:   Colon cancer screening    Past Medical History:   Past Medical History:   Diagnosis Date    Acid reflux     Anemia, unspecified     Cancer August 2022    Skin cancer on face    Chronic allergic rhinitis     Condition not found     Ulcer    Corns and callus     Diabetes     Unspecified    Diarrhea 04/15/2014    Elevated cholesterol     GERD (gastroesophageal reflux disease)     Gout 02/13/2023    Possible gout, not sure    HBP (high blood pressure)     High cholesterol     HTN (hypertension)     Hyperlipemia     Hypothyroidism     Ingrown toenail     Leg pain     Lumbar disc herniation 11/22/2013    L3-4 very small fat lat disc    Lumbar pain     Neuropathy in diabetes 2021    Numbness in feet     Renal insufficiency 03/16/2021    Sciatica 11/22/2013    no sig structural compressive lesion    Seasonal allergies     Sleep apnea     Stomach ulcer     Thyroid disorder        Past Surgical History:  Past Surgical History:   Procedure Laterality Date    COLONOSCOPY      COLONOSCOPY N/A 09/30/2024    Procedure: COLONOSCOPY FOR SCREENING;  Surgeon: Oswald Farias MD;  Location: [facility] ENDOSCOPY;  Service: Gastroenterology;  Laterality: N/A;  INCOMPLETE COLONOSCOPY WITH  POOR PREP    ENDOSCOPY      ENDOSCOPY N/A 09/30/2024    Procedure: ESOPHAGOGASTRODUODENOSCOPY WITH BALLOON DILATION OF ESOPHAGUS;  Surgeon: Oswald Fraias MD;  Location: [facility] ENDOSCOPY;  Service: Gastroenterology;  Laterality: N/A;  HIATAL HERNIA, SCHATZSKI'S RING    JOINT REPLACEMENT      SHOULDER ACROMIOCLAVICULAR JOINT REPAIR      SHOULDER SURGERY Right 10/2010    TUBAL ABDOMINAL LIGATION      UPPER GASTROINTESTINAL ENDOSCOPY         Family History:  Family History   Problem Relation Age of Onset    Cancer Mother         unspecified    Lung cancer Mother     Diabetes Father         unspecified type    Prostate cancer Father     Diabetes Paternal Aunt          unspecified type    Diabetes Paternal Uncle         unspecified type    Diabetes Paternal Grandmother         unspecified type    Heart attack Other     Diabetes Other     Colon cancer Neg Hx        Social History:   reports that she has never smoked. She has never been exposed to tobacco smoke. She has never used smokeless tobacco. She reports current alcohol use. She reports that she does not use drugs.    Medications:   Medications Prior to Admission   Medication Sig Dispense Refill Last Dose/Taking    allopurinol (ZYLOPRIM) 100 MG tablet Take 2 tablets by mouth Daily for 180 days. 180 tablet 1 1/20/2025    azelaic acid (AZELEX) 15 % gel    Past Week    cetirizine (zyrTEC) 10 MG tablet Take 1 tablet by mouth Every Night. 90 tablet 1 1/20/2025    DULoxetine (CYMBALTA) 60 MG capsule Take 1 capsule by mouth 2 (Two) Times a Day. 180 capsule 1 1/20/2025    famotidine (Pepcid) 20 MG tablet Take 1 tablet by mouth 2 (Two) Times a Day. 180 tablet 1 1/20/2025    ketoconazole (NIZORAL) 2 % shampoo    Past Week    levothyroxine (Synthroid) 50 MCG tablet Take 1 tablet by mouth Daily. 90 tablet 1 1/20/2025    rosuvastatin (Crestor) 40 MG tablet Take 1 tablet by mouth Every Night. 90 tablet 1 1/20/2025    vitamin D (ERGOCALCIFEROL) 1.25 MG (86148 UT) capsule capsule Take 1 capsule by mouth 1 (One) Time Per Week. 13 capsule 1 Past Week    glucose blood (FREESTYLE LITE) test strip Use one test strip daily to check blood sugar levels. 100 each 2     Lancets (freestyle) lancets 1 each by Other route 2 (Two) Times a Day. 100 each 3     Tirzepatide (Mounjaro) 5 MG/0.5ML solution auto-injector Inject 5 mg under the skin into the appropriate area as directed Every 7 (Seven) Days. 2 mL 5 1/12/2025       Allergies:  Cephalexin, Latex, and Penicillins        Objective     Blood pressure 132/70, pulse 73, temperature 97.6 °F (36.4 °C), temperature source Temporal, resp. rate 22, weight 77.3 kg (170 lb 6.7 oz), SpO2 98%, not  currently breastfeeding.    Physical Exam   Constitutional: Pt is oriented to person, place, and time and well-developed, well-nourished, and in no distress.   Mouth/Throat: Oropharynx is clear and moist.   Neck: Normal range of motion.   Cardiovascular: Normal rate, regular rhythm and normal heart sounds.    Pulmonary/Chest: Effort normal and breath sounds normal.   Abdominal: Soft. Nontender  Skin: Skin is warm and dry.   Psychiatric: Mood, memory, affect and judgment normal.     Assessment & Plan     Diagnosis:  Colon cancer screening    Anticipated Surgical Procedure:  Colonoscopy    The risks, benefits, and alternatives of this procedure have been discussed with the patient or the responsible party- the patient understands and agrees to proceed.

## 2025-01-27 ENCOUNTER — TELEPHONE (OUTPATIENT)
Dept: FAMILY MEDICINE CLINIC | Facility: CLINIC | Age: 70
End: 2025-01-27
Payer: MEDICARE

## 2025-01-27 ENCOUNTER — TELEPHONE (OUTPATIENT)
Dept: GASTROENTEROLOGY | Facility: CLINIC | Age: 70
End: 2025-01-27
Payer: MEDICARE

## 2025-01-27 NOTE — TELEPHONE ENCOUNTER
Caller: Vickie Bull    Relationship: Self    Best call back number: [phone]     What orders are you requesting (i.e. lab or imaging): A1C BLOOD WORK     In what timeframe would the patient need to come in: 1/29    Where will you receive your lab/imaging services: [facility]    Additional notes: PATIENT STATES SHE JUST WANTS HER A1C CHECKED SINCE SHE IS ON MOUNJARO.

## 2025-01-27 NOTE — TELEPHONE ENCOUNTER
Colonoscopy 1/21/2025: Good prep, grade 1 internal hemorrhoids,   Repeat colon in 5 years per Dr. Farias, send letter

## 2025-01-28 DIAGNOSIS — E11.9 TYPE 2 DIABETES MELLITUS WITHOUT COMPLICATION, WITH LONG-TERM CURRENT USE OF INSULIN: Primary | ICD-10-CM

## 2025-01-28 DIAGNOSIS — Z79.4 TYPE 2 DIABETES MELLITUS WITHOUT COMPLICATION, WITH LONG-TERM CURRENT USE OF INSULIN: Primary | ICD-10-CM

## 2025-01-31 NOTE — TELEPHONE ENCOUNTER
I have added pt to 5 yr Colon Recall for 1/21/2030 and updated Health Maintenance. Letter sent to patient.

## 2025-02-04 ENCOUNTER — LAB (OUTPATIENT)
Dept: LAB | Facility: HOSPITAL | Age: 70
End: 2025-02-04
Payer: MEDICARE

## 2025-02-04 DIAGNOSIS — E11.9 TYPE 2 DIABETES MELLITUS WITHOUT COMPLICATION, WITH LONG-TERM CURRENT USE OF INSULIN: ICD-10-CM

## 2025-02-04 DIAGNOSIS — Z79.4 TYPE 2 DIABETES MELLITUS WITHOUT COMPLICATION, WITH LONG-TERM CURRENT USE OF INSULIN: ICD-10-CM

## 2025-02-04 DIAGNOSIS — N28.9 KIDNEY FUNCTION ABNORMAL: ICD-10-CM

## 2025-02-04 LAB
ALBUMIN SERPL-MCNC: 4.3 G/DL (ref 3.5–5.2)
ALBUMIN/GLOB SERPL: 1.5 G/DL
ALP SERPL-CCNC: 97 U/L (ref 39–117)
ALT SERPL W P-5'-P-CCNC: 31 U/L (ref 1–33)
ANION GAP SERPL CALCULATED.3IONS-SCNC: 10.9 MMOL/L (ref 5–15)
AST SERPL-CCNC: 31 U/L (ref 1–32)
BILIRUB SERPL-MCNC: 0.4 MG/DL (ref 0–1.2)
BUN SERPL-MCNC: 16 MG/DL (ref 8–23)
BUN/CREAT SERPL: 12 (ref 7–25)
CALCIUM SPEC-SCNC: 9.5 MG/DL (ref 8.6–10.5)
CHLORIDE SERPL-SCNC: 101 MMOL/L (ref 98–107)
CO2 SERPL-SCNC: 28.1 MMOL/L (ref 22–29)
CREAT SERPL-MCNC: 1.33 MG/DL (ref 0.57–1)
EGFRCR SERPLBLD CKD-EPI 2021: 43.1 ML/MIN/1.73
GLOBULIN UR ELPH-MCNC: 2.8 GM/DL
GLUCOSE SERPL-MCNC: 94 MG/DL (ref 65–99)
HBA1C MFR BLD: 5.7 % (ref 4.8–5.6)
POTASSIUM SERPL-SCNC: 4.8 MMOL/L (ref 3.5–5.2)
PROT SERPL-MCNC: 7.1 G/DL (ref 6–8.5)
SODIUM SERPL-SCNC: 140 MMOL/L (ref 136–145)

## 2025-02-04 PROCEDURE — 83036 HEMOGLOBIN GLYCOSYLATED A1C: CPT

## 2025-02-04 PROCEDURE — 80053 COMPREHEN METABOLIC PANEL: CPT

## 2025-02-04 PROCEDURE — 36415 COLL VENOUS BLD VENIPUNCTURE: CPT

## 2025-02-25 ENCOUNTER — HOSPITAL ENCOUNTER (OUTPATIENT)
Dept: BONE DENSITY | Facility: HOSPITAL | Age: 70
Discharge: HOME | End: 2025-02-25
Payer: MEDICARE

## 2025-02-25 ENCOUNTER — HOSPITAL ENCOUNTER (OUTPATIENT)
Dept: MAMMOGRAPHY | Facility: HOSPITAL | Age: 70
Discharge: HOME | End: 2025-02-25
Payer: MEDICARE

## 2025-02-25 DIAGNOSIS — Z12.31 SCREENING MAMMOGRAM FOR BREAST CANCER: ICD-10-CM

## 2025-02-25 DIAGNOSIS — Z78.0 POST-MENOPAUSAL: ICD-10-CM

## 2025-02-25 PROCEDURE — 77063 BREAST TOMOSYNTHESIS BI: CPT

## 2025-02-25 PROCEDURE — 77067 SCR MAMMO BI INCL CAD: CPT

## 2025-02-25 PROCEDURE — 77080 DXA BONE DENSITY AXIAL: CPT

## 2025-03-21 ENCOUNTER — TELEPHONE (OUTPATIENT)
Dept: FAMILY MEDICINE CLINIC | Facility: CLINIC | Age: 70
End: 2025-03-21
Payer: MEDICARE

## 2025-03-21 NOTE — TELEPHONE ENCOUNTER
Caller: Vickie Bull    Relationship: Self    Best call back number: [phone]    What medication are you requesting: MOUNJARO INCREASE FROM 5.0 TO 7.5 MG    If a prescription is needed, what is your preferred pharmacy and phone number: [facility] [address] [phone] [facility] [phone]      Additional notes:CALLER STATED THAT THIS IS A MEDICATION THAT HAS BEEN PROVIDED IN THE PAST BY PRIMARY CARE PHYSICIAN AND IS WANTING AN INCREASED DOSAGE.   CALLER STATED THAT THE MEDICATION HAS NOT BEEN WORKING WELL RECENTLY.

## 2025-03-24 DIAGNOSIS — E11.9 TYPE 2 DIABETES MELLITUS WITHOUT COMPLICATION, WITH LONG-TERM CURRENT USE OF INSULIN: Primary | ICD-10-CM

## 2025-03-24 DIAGNOSIS — Z79.4 TYPE 2 DIABETES MELLITUS WITHOUT COMPLICATION, WITH LONG-TERM CURRENT USE OF INSULIN: Primary | ICD-10-CM

## 2025-04-07 ENCOUNTER — TELEPHONE (OUTPATIENT)
Dept: FAMILY MEDICINE CLINIC | Facility: CLINIC | Age: 70
End: 2025-04-07
Payer: MEDICARE

## 2025-04-07 NOTE — TELEPHONE ENCOUNTER
Caller: Mary   Relationship: [facility]   Best call back number:   Who are you requesting to speak with (clinical staff, provider, specific staff member): N/A     What was the call regarding:    [facility] states they received a prescription today from a client that was over six months old. Patient is now a Medicare patient so they need additional information from our office. Nguyen can sign the new prescription, but an MD must also sign it with her. They also request a foot exam form; all of this is being faxed over and will be required for every Medicare patient moving forward. She is just letting us know ahead of time if we want to make copies of any of the forms she's sending.     Patient does have a follow up with our office next month.

## 2025-05-08 ENCOUNTER — TELEPHONE (OUTPATIENT)
Dept: FAMILY MEDICINE CLINIC | Facility: CLINIC | Age: 70
End: 2025-05-08
Payer: MEDICARE

## 2025-05-08 NOTE — TELEPHONE ENCOUNTER
[facility]'s diabetic [facility] signed off by an MD.  She states it was signed by CC but needs an MD.      Please fax to [phone]

## 2025-05-19 ENCOUNTER — LAB (OUTPATIENT)
Dept: LAB | Facility: HOSPITAL | Age: 70
End: 2025-05-19
Payer: MEDICARE

## 2025-05-19 DIAGNOSIS — E11.9 TYPE 2 DIABETES MELLITUS WITHOUT COMPLICATION, WITH LONG-TERM CURRENT USE OF INSULIN: ICD-10-CM

## 2025-05-19 DIAGNOSIS — E03.9 ACQUIRED HYPOTHYROIDISM: ICD-10-CM

## 2025-05-19 DIAGNOSIS — Z79.4 TYPE 2 DIABETES MELLITUS WITHOUT COMPLICATION, WITH LONG-TERM CURRENT USE OF INSULIN: ICD-10-CM

## 2025-05-19 DIAGNOSIS — E78.2 MIXED HYPERLIPIDEMIA: ICD-10-CM

## 2025-05-19 DIAGNOSIS — E55.9 VITAMIN D DEFICIENCY: ICD-10-CM

## 2025-05-19 LAB
BACTERIA UR QL AUTO: NORMAL /HPF
BASOPHILS # BLD AUTO: 0.06 10*3/MM3 (ref 0–0.2)
BASOPHILS NFR BLD AUTO: 1.2 % (ref 0–1.5)
BILIRUB UR QL STRIP: NEGATIVE
CLARITY UR: CLEAR
COLOR UR: YELLOW
DEPRECATED RDW RBC AUTO: 48.1 FL (ref 37–54)
EOSINOPHIL # BLD AUTO: 0.2 10*3/MM3 (ref 0–0.4)
EOSINOPHIL NFR BLD AUTO: 3.9 % (ref 0.3–6.2)
ERYTHROCYTE [DISTWIDTH] IN BLOOD BY AUTOMATED COUNT: 15.1 % (ref 12.3–15.4)
GLUCOSE UR STRIP-MCNC: NEGATIVE MG/DL
HCT VFR BLD AUTO: 43.4 % (ref 34–46.6)
HGB BLD-MCNC: 13 G/DL (ref 12–15.9)
HGB UR QL STRIP.AUTO: NEGATIVE
HOLD SPECIMEN: NORMAL
HYALINE CASTS UR QL AUTO: NORMAL /LPF
IMM GRANULOCYTES # BLD AUTO: 0.01 10*3/MM3 (ref 0–0.05)
IMM GRANULOCYTES NFR BLD AUTO: 0.2 % (ref 0–0.5)
KETONES UR QL STRIP: NEGATIVE
LEUKOCYTE ESTERASE UR QL STRIP.AUTO: NEGATIVE
LYMPHOCYTES # BLD AUTO: 1.53 10*3/MM3 (ref 0.7–3.1)
LYMPHOCYTES NFR BLD AUTO: 29.5 % (ref 19.6–45.3)
MCH RBC QN AUTO: 25.7 PG (ref 26.6–33)
MCHC RBC AUTO-ENTMCNC: 30 G/DL (ref 31.5–35.7)
MCV RBC AUTO: 85.9 FL (ref 79–97)
MONOCYTES # BLD AUTO: 0.63 10*3/MM3 (ref 0.1–0.9)
MONOCYTES NFR BLD AUTO: 12.2 % (ref 5–12)
NEUTROPHILS NFR BLD AUTO: 2.75 10*3/MM3 (ref 1.7–7)
NEUTROPHILS NFR BLD AUTO: 53 % (ref 42.7–76)
NITRITE UR QL STRIP: NEGATIVE
NRBC BLD AUTO-RTO: 0 /100 WBC (ref 0–0.2)
PH UR STRIP.AUTO: 7.5 [PH] (ref 5–8)
PLATELET # BLD AUTO: 240 10*3/MM3 (ref 140–450)
PMV BLD AUTO: 10.5 FL (ref 6–12)
PROT UR QL STRIP: ABNORMAL
RBC # BLD AUTO: 5.05 10*6/MM3 (ref 3.77–5.28)
RBC # UR STRIP: NORMAL /HPF
REF LAB TEST METHOD: NORMAL
SP GR UR STRIP: 1.02 (ref 1–1.03)
SQUAMOUS #/AREA URNS HPF: NORMAL /HPF
UROBILINOGEN UR QL STRIP: ABNORMAL
WBC # UR STRIP: NORMAL /HPF
WBC NRBC COR # BLD AUTO: 5.18 10*3/MM3 (ref 3.4–10.8)

## 2025-05-19 PROCEDURE — 80061 LIPID PANEL: CPT

## 2025-05-19 PROCEDURE — 80053 COMPREHEN METABOLIC PANEL: CPT

## 2025-05-19 PROCEDURE — 84443 ASSAY THYROID STIM HORMONE: CPT

## 2025-05-19 PROCEDURE — 83036 HEMOGLOBIN GLYCOSYLATED A1C: CPT

## 2025-05-19 PROCEDURE — 82043 UR ALBUMIN QUANTITATIVE: CPT

## 2025-05-19 PROCEDURE — 82570 ASSAY OF URINE CREATININE: CPT

## 2025-05-19 PROCEDURE — 82306 VITAMIN D 25 HYDROXY: CPT

## 2025-05-19 PROCEDURE — 81001 URINALYSIS AUTO W/SCOPE: CPT

## 2025-05-19 PROCEDURE — 85025 COMPLETE CBC W/AUTO DIFF WBC: CPT

## 2025-05-19 NOTE — PROGRESS NOTES
Follow Up Office Visit      Patient Name: Vickie Bull  : 1955   MRN: [id number]     Chief Complaint:  DM2, hyperlipidemia, HTN, hypothyroidism, CKD     History of Present Illness: Vickie Bull is a 69 y.o. female who is here today to follow up for DM2, hyperlipidemia, HTN, hypothyroidism, CKD   REVIEW LAB RESULTS    Patient is wanting a Rx for flonase, sinus congestion and drainage     A1C-2025       Follow up increase dose of mounjaro once weekly patient reports tolerating well   loss of 18 lbs in the past 6 months   Eye exam- Dr Fields  Foot exam- Dr Balderrama. 2023  mammogram-2025  dexa-2025  Pap-  Dr Miller  Colonoscopy-2025  EGD       Patient complaining of uncontrolled constipation states she is taking 2 stool softeners a day still having hard marblelike stools    Follow-up cardiology consult per progress note  2/3/2025  Assessment, Management, and Plan:     Overall, she seems to be doing very well from a cardiovascular standpoint. Her blood pressure appears to be optimally controlled. She seems euvolemic on physical examination and does not appear to be demonstrating any significant signs of congestive heart failure.    She has a history of right ventricular enlargement which is likely secondary to obstructive sleep apnea. Unfortunately, she has not been able to tolerate nightly CPAP therapy.    With respect to her coronary artery disease, she has not been experiencing any anginal chest discomfort. On today's visit, I have asked her to begin antiplatelet therapy with aspirin 81 mg p.o. daily.    I will not make any other changes to her medical regimen currently. I will plan on seeing her back in approximately 6 months for routine follow-up.    Visit Diagnoses:     Diagnoses and all orders for this visit:  Right cardiac ventricular dilatation  Heart valve disorder  Coronary atherosclerosis  Tachycardia  Other orders  - aspirin 81 MG chewable tablet; Chew 1 tablet  (81 mg total) 1 (one) time each day.    Follow up in about 6 months (around 8/3/2025).        Subjective      Review of Systems:   Review of Systems   Constitutional:  Negative for fatigue.   HENT:  Positive for congestion, postnasal drip and rhinorrhea. Negative for ear pain.    Eyes:  Negative for discharge.   Respiratory:  Negative for cough.    Cardiovascular:  Negative for chest pain.   Gastrointestinal:  Positive for constipation. Negative for abdominal pain.   Genitourinary:  Negative for dysuria.   Musculoskeletal:  Negative for myalgias.   Allergic/Immunologic: Positive for environmental allergies.   Neurological:  Negative for speech difficulty and headaches.   Psychiatric/Behavioral:  Negative for confusion.         Past Medical History:   Past Medical History:   Diagnosis Date    Acid reflux     Anemia, unspecified     Cancer August 2022    Skin cancer on face    Chronic allergic rhinitis     Condition not found     Ulcer    Corns and callus     Diabetes     Unspecified    Diarrhea 04/15/2014    Elevated cholesterol     GERD (gastroesophageal reflux disease)     Gout 02/13/2023    Possible gout, not sure    HBP (high blood pressure)     High cholesterol     HTN (hypertension)     Hyperlipemia     Hypothyroidism     Ingrown toenail     Leg pain     Lumbar disc herniation 11/22/2013    L3-4 very small fat lat disc    Lumbar pain     Neuropathy in diabetes 2021    Numbness in feet     Renal insufficiency 03/16/2021    Sciatica 11/22/2013    no sig structural compressive lesion    Seasonal allergies     Sleep apnea     Stomach ulcer     Thyroid disorder        Past Surgical History:   Past Surgical History:   Procedure Laterality Date    COLONOSCOPY      COLONOSCOPY N/A 09/30/2024    Procedure: COLONOSCOPY FOR SCREENING;  Surgeon: Oswald Farias MD;  Location: [facility] ENDOSCOPY;  Service: Gastroenterology;  Laterality: N/A;  INCOMPLETE COLONOSCOPY WITH  POOR PREP    COLONOSCOPY N/A 1/21/2025     Procedure: COLONOSCOPY;  Surgeon: Oswald Farias MD;  Location: [facility] ENDOSCOPY;  Service: Gastroenterology;  Laterality: N/A;  normal    ENDOSCOPY      ENDOSCOPY N/A 09/30/2024    Procedure: ESOPHAGOGASTRODUODENOSCOPY WITH BALLOON DILATION OF ESOPHAGUS;  Surgeon: Oswald Farias MD;  Location: [facility] ENDOSCOPY;  Service: Gastroenterology;  Laterality: N/A;  HIATAL HERNIA, SCHATZSKI'S RING    JOINT REPLACEMENT      SHOULDER ACROMIOCLAVICULAR JOINT REPAIR      SHOULDER SURGERY Right 10/2010    TUBAL ABDOMINAL LIGATION      UPPER GASTROINTESTINAL ENDOSCOPY         Family History:   Family History   Problem Relation Age of Onset    Cancer Mother         unspecified    Lung cancer Mother     Diabetes Father         unspecified type    Prostate cancer Father     Diabetes Paternal Aunt         unspecified type    Diabetes Paternal Uncle         unspecified type    Diabetes Paternal Grandmother         unspecified type    Heart attack Other     Diabetes Other     Colon cancer Neg Hx        Social History:   Social History     Socioeconomic History    Marital status:    Tobacco Use    Smoking status: Never     Passive exposure: Never    Smokeless tobacco: Never    Tobacco comments:     second hand smoke exposure-never   Vaping Use    Vaping status: Never Used   Substance and Sexual Activity    Alcohol use: Yes     Comment: Rarely    Drug use: Never    Sexual activity: Yes     Partners: Male       Medications:     Current Outpatient Medications:     allopurinol 200 MG tablet, Take 200 mg by mouth Daily., Disp: 90 tablet, Rfl: 1    aspirin 81 MG chewable tablet, Chew 1 tablet Daily., Disp: , Rfl:     azelaic acid (AZELEX) 15 % gel, , Disp: , Rfl:     cetirizine (zyrTEC) 10 MG tablet, Take 1 tablet by mouth Every Night., Disp: 90 tablet, Rfl: 1    DULoxetine (CYMBALTA) 60 MG capsule, Take 1 capsule by mouth 2 (Two) Times a Day., Disp: 180 capsule, Rfl: 1    famotidine (Pepcid) 20 MG tablet, Take 1 tablet by  "mouth 2 (Two) Times a Day., Disp: 180 tablet, Rfl: 1    glucose blood (FREESTYLE LITE) test strip, Use one test strip daily to check blood sugar levels., Disp: 100 each, Rfl: 2    ketoconazole (NIZORAL) 2 % shampoo, , Disp: , Rfl:     Lancets (freestyle) lancets, 1 each by Other route 2 (Two) Times a Day., Disp: 100 each, Rfl: 3    levothyroxine (Synthroid) 50 MCG tablet, Take 1 tablet by mouth Daily., Disp: 90 tablet, Rfl: 1    rosuvastatin (Crestor) 40 MG tablet, Take 1 tablet by mouth Every Night., Disp: 90 tablet, Rfl: 1    vitamin D (ERGOCALCIFEROL) 1.25 MG (13047 UT) capsule capsule, Take 1 capsule by mouth 1 (One) Time Per Week., Disp: 13 capsule, Rfl: 1    fluticasone (FLONASE) 50 MCG/ACT nasal spray, Administer 2 sprays into the nostril(s) as directed by provider Daily. 2 sprays each nostril daily, Disp: 16 g, Rfl: 1    psyllium (Metamucil Smooth Texture) 58.6 % powder, Take  by mouth Daily., Disp: 660 g, Rfl: 3    Tirzepatide 10 MG/0.5ML solution auto-injector, Inject 10 mg under the skin into the appropriate area as directed 1 (One) Time Per Week., Disp: 2 mL, Rfl: 5    Allergies:   Allergies   Allergen Reactions    Cephalexin Hives    Latex Itching    Penicillins Hives, Rash and Itching     Other Reaction(s): Eruption of skin, Unknown, Unknown, Eruption of skin, Unknown, Eruption of skin      PER PT Updated using clean up process. Changed reactant from AMPICILLIN (ingredient) to AMPICILLIN(file - PSNDF(50.6,)           Objective     Physical Exam:  Vital Signs:   Vitals:    05/20/25 1329   BP: 113/70   Pulse: 92   Temp: 96.5 °F (35.8 °C)   SpO2: 98%   Weight: 72.4 kg (159 lb 9.6 oz)   Height: 162.6 cm (64.02\")   PainSc: 4    PainLoc: Back     Body mass index is 27.38 kg/m².           Physical Exam  HENT:      Mouth/Throat:      Mouth: Mucous membranes are moist.   Eyes:      Conjunctiva/sclera: Conjunctivae normal.   Cardiovascular:      Rate and Rhythm: Normal rate and regular rhythm.      Heart sounds: " Normal heart sounds. No murmur heard.  Pulmonary:      Effort: Pulmonary effort is normal.      Breath sounds: Normal breath sounds.   Abdominal:      General: Bowel sounds are normal.      Palpations: Abdomen is soft.   Musculoskeletal:      Right lower leg: No edema.      Left lower leg: No edema.   Skin:     General: Skin is warm and dry.   Neurological:      Mental Status: She is alert.   Psychiatric:         Mood and Affect: Mood normal.         Behavior: Behavior normal.             Assessment / Plan      Assessment/Plan:   Diagnoses and all orders for this visit:    1. Type 2 diabetes mellitus without complication, with long-term current use of insulin (Primary)  -     CBC Auto Differential; Future  -     Comprehensive Metabolic Panel; Future  -     Hemoglobin A1c; Future  -     Urinalysis With Culture If Indicated -; Future    2. Primary hypertension    3. Mixed hyperlipidemia  -     Lipid Panel; Future    4. Acquired hypothyroidism  -     TSH; Future    5. Gastroesophageal reflux disease without esophagitis    6. Stage 3 chronic kidney disease, unspecified whether stage 3a or 3b CKD  -     Comprehensive Metabolic Panel; Future    7. Vitamin D deficiency  -     Vitamin D,25-Hydroxy; Future    8. Seasonal allergic rhinitis, unspecified trigger    9. Gout, unspecified cause, unspecified chronicity, unspecified site  -     Uric Acid; Future    10. Constipation, unspecified constipation type    Other orders  -     allopurinol 200 MG tablet; Take 200 mg by mouth Daily.  Dispense: 90 tablet; Refill: 1  -     DULoxetine (CYMBALTA) 60 MG capsule; Take 1 capsule by mouth 2 (Two) Times a Day.  Dispense: 180 capsule; Refill: 1  -     famotidine (Pepcid) 20 MG tablet; Take 1 tablet by mouth 2 (Two) Times a Day.  Dispense: 180 tablet; Refill: 1  -     levothyroxine (Synthroid) 50 MCG tablet; Take 1 tablet by mouth Daily.  Dispense: 90 tablet; Refill: 1  -     rosuvastatin (Crestor) 40 MG tablet; Take 1 tablet by mouth  "Every Night.  Dispense: 90 tablet; Refill: 1  -     psyllium (Metamucil Smooth Texture) 58.6 % powder; Take  by mouth Daily.  Dispense: 660 g; Refill: 3  -     fluticasone (FLONASE) 50 MCG/ACT nasal spray; Administer 2 sprays into the nostril(s) as directed by provider Daily. 2 sprays each nostril daily  Dispense: 16 g; Refill: 1  -     vitamin D (ERGOCALCIFEROL) 1.25 MG (71031 UT) capsule capsule; Take 1 capsule by mouth 1 (One) Time Per Week.  Dispense: 13 capsule; Refill: 1  -     Tirzepatide 10 MG/0.5ML solution auto-injector; Inject 10 mg under the skin into the appropriate area as directed 1 (One) Time Per Week.  Dispense: 2 mL; Refill: 5         Diabetes mellitus type 2 will continue Mounjaro  Hypertension currently controlled  Hyperlipidemia LDL below goal on current statin dose Crestor 40 mg at nighttime denies myalgias  Hypothyroidism TSH within goal range on Synthroid 50 mcg daily denies palpitations  Reflux currently controlled Pepcid  Chronic kidney stage III avoid all NSAIDs increase water intake  Vitamin D deficiency continue current 50,000's once weekly  Seasonal allergies currently controlled with Zyrtec  Gout uric acid level within goal range continue allopurinol 200 mg daily  Constipation uncontrolled continue stool softeners add Metamucil increase water intake and exercise 30 minutes daily    Follow Up:   Return in about 6 months (around 11/20/2025).    Erwin Ascencio, HANS    \"Please note that portions of this note were completed with a voice recognition program.\"     "

## 2025-05-20 ENCOUNTER — OFFICE VISIT (OUTPATIENT)
Dept: FAMILY MEDICINE CLINIC | Facility: CLINIC | Age: 70
End: 2025-05-20
Payer: MEDICARE

## 2025-05-20 VITALS
HEIGHT: 64 IN | HEART RATE: 92 BPM | TEMPERATURE: 96.5 F | BODY MASS INDEX: 27.25 KG/M2 | DIASTOLIC BLOOD PRESSURE: 70 MMHG | WEIGHT: 159.6 LBS | OXYGEN SATURATION: 98 % | SYSTOLIC BLOOD PRESSURE: 113 MMHG

## 2025-05-20 DIAGNOSIS — M10.9 GOUT, UNSPECIFIED CAUSE, UNSPECIFIED CHRONICITY, UNSPECIFIED SITE: ICD-10-CM

## 2025-05-20 DIAGNOSIS — K59.00 CONSTIPATION, UNSPECIFIED CONSTIPATION TYPE: ICD-10-CM

## 2025-05-20 DIAGNOSIS — E03.9 ACQUIRED HYPOTHYROIDISM: ICD-10-CM

## 2025-05-20 DIAGNOSIS — N18.30 STAGE 3 CHRONIC KIDNEY DISEASE, UNSPECIFIED WHETHER STAGE 3A OR 3B CKD: ICD-10-CM

## 2025-05-20 DIAGNOSIS — I10 PRIMARY HYPERTENSION: ICD-10-CM

## 2025-05-20 DIAGNOSIS — K21.9 GASTROESOPHAGEAL REFLUX DISEASE WITHOUT ESOPHAGITIS: ICD-10-CM

## 2025-05-20 DIAGNOSIS — J30.2 SEASONAL ALLERGIC RHINITIS, UNSPECIFIED TRIGGER: ICD-10-CM

## 2025-05-20 DIAGNOSIS — E55.9 VITAMIN D DEFICIENCY: ICD-10-CM

## 2025-05-20 DIAGNOSIS — E11.9 TYPE 2 DIABETES MELLITUS WITHOUT COMPLICATION, WITH LONG-TERM CURRENT USE OF INSULIN: Primary | ICD-10-CM

## 2025-05-20 DIAGNOSIS — Z79.4 TYPE 2 DIABETES MELLITUS WITHOUT COMPLICATION, WITH LONG-TERM CURRENT USE OF INSULIN: Primary | ICD-10-CM

## 2025-05-20 DIAGNOSIS — E78.2 MIXED HYPERLIPIDEMIA: ICD-10-CM

## 2025-05-20 LAB
25(OH)D3 SERPL-MCNC: 83 NG/ML (ref 30–100)
ALBUMIN SERPL-MCNC: 4.2 G/DL (ref 3.5–5.2)
ALBUMIN UR-MCNC: <1.2 MG/DL
ALBUMIN/GLOB SERPL: 1.7 G/DL
ALP SERPL-CCNC: 92 U/L (ref 39–117)
ALT SERPL W P-5'-P-CCNC: 47 U/L (ref 1–33)
ANION GAP SERPL CALCULATED.3IONS-SCNC: 9.2 MMOL/L (ref 5–15)
AST SERPL-CCNC: 40 U/L (ref 1–32)
BILIRUB SERPL-MCNC: 0.4 MG/DL (ref 0–1.2)
BUN SERPL-MCNC: 18 MG/DL (ref 8–23)
BUN/CREAT SERPL: 14.3 (ref 7–25)
CALCIUM SPEC-SCNC: 9.4 MG/DL (ref 8.6–10.5)
CHLORIDE SERPL-SCNC: 105 MMOL/L (ref 98–107)
CHOLEST SERPL-MCNC: 151 MG/DL (ref 0–200)
CO2 SERPL-SCNC: 28.8 MMOL/L (ref 22–29)
CREAT SERPL-MCNC: 1.26 MG/DL (ref 0.57–1)
CREAT UR-MCNC: 108.4 MG/DL
EGFRCR SERPLBLD CKD-EPI 2021: 46 ML/MIN/1.73
GLOBULIN UR ELPH-MCNC: 2.5 GM/DL
GLUCOSE SERPL-MCNC: 77 MG/DL (ref 65–99)
HBA1C MFR BLD: 5.7 % (ref 4.8–5.6)
HDLC SERPL-MCNC: 70 MG/DL (ref 40–60)
LDLC SERPL CALC-MCNC: 68 MG/DL (ref 0–100)
LDLC/HDLC SERPL: 0.96 {RATIO}
MICROALBUMIN/CREAT UR: NORMAL MG/G{CREAT}
POTASSIUM SERPL-SCNC: 5.1 MMOL/L (ref 3.5–5.2)
PROT SERPL-MCNC: 6.7 G/DL (ref 6–8.5)
SODIUM SERPL-SCNC: 143 MMOL/L (ref 136–145)
TRIGL SERPL-MCNC: 68 MG/DL (ref 0–150)
TSH SERPL DL<=0.05 MIU/L-ACNC: 1.02 UIU/ML (ref 0.27–4.2)
VLDLC SERPL-MCNC: 13 MG/DL (ref 5–40)

## 2025-05-20 RX ORDER — ERGOCALCIFEROL 1.25 MG/1
50000 CAPSULE, LIQUID FILLED ORAL WEEKLY
Qty: 13 CAPSULE | Refills: 1 | Status: SHIPPED | OUTPATIENT
Start: 2025-05-20

## 2025-05-20 RX ORDER — DULOXETIN HYDROCHLORIDE 60 MG/1
60 CAPSULE, DELAYED RELEASE ORAL 2 TIMES DAILY
Qty: 180 CAPSULE | Refills: 1 | Status: SHIPPED | OUTPATIENT
Start: 2025-05-20

## 2025-05-20 RX ORDER — ALUMINUM ZIRCONIUM OCTACHLOROHYDREX GLY 16 G/100G
GEL TOPICAL DAILY
Qty: 660 G | Refills: 3 | Status: SHIPPED | OUTPATIENT
Start: 2025-05-20

## 2025-05-20 RX ORDER — FLUTICASONE PROPIONATE 50 MCG
2 SPRAY, SUSPENSION (ML) NASAL DAILY
Qty: 16 G | Refills: 1 | Status: SHIPPED | OUTPATIENT
Start: 2025-05-20

## 2025-05-20 RX ORDER — FAMOTIDINE 20 MG/1
20 TABLET, FILM COATED ORAL 2 TIMES DAILY
Qty: 180 TABLET | Refills: 1 | Status: SHIPPED | OUTPATIENT
Start: 2025-05-20

## 2025-05-20 RX ORDER — ASPIRIN 81 MG/1
81 TABLET, CHEWABLE ORAL DAILY
COMMUNITY
Start: 2025-02-03 | End: 2026-02-03

## 2025-05-20 RX ORDER — ALLOPURINOL 100 MG/1
100 TABLET ORAL 2 TIMES DAILY
COMMUNITY
End: 2025-05-20 | Stop reason: SDUPTHER

## 2025-05-20 RX ORDER — ROSUVASTATIN CALCIUM 40 MG/1
40 TABLET, COATED ORAL NIGHTLY
Qty: 90 TABLET | Refills: 1 | Status: SHIPPED | OUTPATIENT
Start: 2025-05-20

## 2025-05-20 RX ORDER — LEVOTHYROXINE SODIUM 50 UG/1
50 TABLET ORAL DAILY
Qty: 90 TABLET | Refills: 1 | Status: SHIPPED | OUTPATIENT
Start: 2025-05-20

## 2025-05-20 RX ORDER — ALLOPURINOL 200 MG/1
200 TABLET ORAL DAILY
Qty: 90 TABLET | Refills: 1 | Status: SHIPPED | OUTPATIENT
Start: 2025-05-20

## 2025-06-24 ENCOUNTER — OFFICE VISIT (OUTPATIENT)
Dept: PODIATRY | Facility: CLINIC | Age: 70
End: 2025-06-24
Payer: MEDICARE

## 2025-06-24 ENCOUNTER — OFFICE VISIT (OUTPATIENT)
Dept: ORTHOPEDIC SURGERY | Facility: CLINIC | Age: 70
End: 2025-06-24
Payer: MEDICARE

## 2025-06-24 VITALS — HEIGHT: 64 IN | WEIGHT: 156 LBS | BODY MASS INDEX: 26.63 KG/M2

## 2025-06-24 VITALS
OXYGEN SATURATION: 96 % | BODY MASS INDEX: 26.63 KG/M2 | HEIGHT: 64 IN | DIASTOLIC BLOOD PRESSURE: 80 MMHG | WEIGHT: 156 LBS | SYSTOLIC BLOOD PRESSURE: 136 MMHG | HEART RATE: 93 BPM

## 2025-06-24 DIAGNOSIS — E11.42 TYPE 2 DIABETES MELLITUS WITH POLYNEUROPATHY: Primary | ICD-10-CM

## 2025-06-24 DIAGNOSIS — M25.551 RIGHT HIP PAIN: Primary | ICD-10-CM

## 2025-06-24 DIAGNOSIS — M79.672 FOOT PAIN, BILATERAL: ICD-10-CM

## 2025-06-24 DIAGNOSIS — M54.16 LUMBAR RADICULOPATHY: ICD-10-CM

## 2025-06-24 DIAGNOSIS — G62.9 NEUROPATHY: ICD-10-CM

## 2025-06-24 DIAGNOSIS — M79.671 FOOT PAIN, BILATERAL: ICD-10-CM

## 2025-06-24 DIAGNOSIS — E11.8 DM FEET: ICD-10-CM

## 2025-06-24 DIAGNOSIS — M70.61 TROCHANTERIC BURSITIS OF RIGHT HIP: ICD-10-CM

## 2025-06-24 PROCEDURE — 99213 OFFICE O/P EST LOW 20 MIN: CPT | Performed by: PODIATRIST

## 2025-06-24 PROCEDURE — 3079F DIAST BP 80-89 MM HG: CPT | Performed by: PODIATRIST

## 2025-06-24 PROCEDURE — 1159F MED LIST DOCD IN RCRD: CPT | Performed by: PODIATRIST

## 2025-06-24 PROCEDURE — 3075F SYST BP GE 130 - 139MM HG: CPT | Performed by: PODIATRIST

## 2025-06-24 PROCEDURE — 1160F RVW MEDS BY RX/DR IN RCRD: CPT | Performed by: PODIATRIST

## 2025-06-24 RX ADMIN — TRIAMCINOLONE ACETONIDE 40 MG: 40 INJECTION, SUSPENSION INTRA-ARTICULAR; INTRAMUSCULAR at 15:26

## 2025-06-24 RX ADMIN — LIDOCAINE HYDROCHLORIDE 5 ML: 10 INJECTION, SOLUTION INFILTRATION; PERINEURAL at 15:26

## 2025-06-24 NOTE — PROGRESS NOTES
[facility] - PODIATRY    Today's Date: 06/24/25    Patient Name: Vickie Bull  MRN: [id number]  CSN: [id number]  PCP: Nguyen Ascencio APRN, Last PCP Visit: 5/20/2025  Referring Provider: No ref. provider found    SUBJECTIVE     Chief Complaint   Patient presents with    Left Foot - Follow-up, Diabetes    Right Foot - Follow-up, Diabetes     HPI: Vickie Bull, a 69 y.o.female, presents to clinic for a diabetic foot evaluation.    New, Established, New Problem:  est    Onset: Insidious    Nature:  NIDDM    Stable, worsening, improving: Stable    Patient controlling diabetes via:  oral meds    Patient states they are unsure of the most recent blood glucose reading.      Patient denies any fevers, chills, nausea, vomiting, shortness of breath, nor any other constitutional signs nor symptoms.    Increasing neuropathy symptoms and numbness in feet.    Medical changes: Stage V CKD.    Past Medical History:   Diagnosis Date    Acid reflux     Allergic 2000    Anemia, unspecified     Ankle sprain     Arthritis of neck     Cancer August 2022    Skin cancer on face    Cervical disc disorder     Chronic allergic rhinitis     Condition not found     Ulcer    Corns and callus     Depression 1995    Diabetes     Unspecified    Diarrhea 04/15/2014    Elevated cholesterol     Fracture of ankle     Fracture of wrist     GERD (gastroesophageal reflux disease)     Gout 02/13/2023    Possible gout, not sure    HBP (high blood pressure)     High cholesterol     HTN (hypertension)     Hyperlipemia     Hypothyroidism     Ingrown toenail     Leg pain     Low back strain     Lumbar disc herniation 11/22/2013    L3-4 very small fat lat disc    Lumbar pain     Lumbosacral disc disease     Neuropathy in diabetes 2021    Numbness in feet     Plantar fasciitis     Renal insufficiency 03/16/2021    Rotator cuff syndrome     Sciatica 11/22/2013    no sig structural compressive lesion    Seasonal allergies      Sleep apnea     Stomach ulcer     Thoracic disc disorder     Thyroid disorder      Past Surgical History:   Procedure Laterality Date    COLONOSCOPY      COLONOSCOPY N/A 09/30/2024    Procedure: COLONOSCOPY FOR SCREENING;  Surgeon: Oswald Farias MD;  Location: [facility] ENDOSCOPY;  Service: Gastroenterology;  Laterality: N/A;  INCOMPLETE COLONOSCOPY WITH  POOR PREP    COLONOSCOPY N/A 01/21/2025    Procedure: COLONOSCOPY;  Surgeon: Oswald Farias MD;  Location: [facility] ENDOSCOPY;  Service: Gastroenterology;  Laterality: N/A;  normal    ENDOSCOPY      ENDOSCOPY N/A 09/30/2024    Procedure: ESOPHAGOGASTRODUODENOSCOPY WITH BALLOON DILATION OF ESOPHAGUS;  Surgeon: Oswald Farias MD;  Location: [facility] ENDOSCOPY;  Service: Gastroenterology;  Laterality: N/A;  HIATAL HERNIA, SCHATZSKI'S RING    JOINT REPLACEMENT      SHOULDER ACROMIOCLAVICULAR JOINT REPAIR      SHOULDER SURGERY Right 10/2010    TUBAL ABDOMINAL LIGATION  1986    UPPER GASTROINTESTINAL ENDOSCOPY  9-30-24     Family History   Problem Relation Age of Onset    Cancer Mother         unspecified    Lung cancer Mother     Diabetes Father         unspecified type    Prostate cancer Father     Diabetes Paternal Aunt         unspecified type    Diabetes Paternal Uncle         unspecified type    Diabetes Paternal Grandmother         unspecified type    Heart attack Other     Diabetes Other     Colon cancer Neg Hx      Social History     Socioeconomic History    Marital status:    Tobacco Use    Smoking status: Never     Passive exposure: Never    Smokeless tobacco: Never    Tobacco comments:     second hand smoke exposure-never   Vaping Use    Vaping status: Never Used   Substance and Sexual Activity    Alcohol use: Yes     Comment: Rarely    Drug use: Never    Sexual activity: Not Currently     Partners: Male     Birth control/protection: Post-menopausal, Tubal ligation     Allergies   Allergen Reactions    Cephalexin Hives    Latex Itching     Penicillins Hives, Rash and Itching     Other Reaction(s): Eruption of skin, Unknown, Unknown, Eruption of skin, Unknown, Eruption of skin      PER PT Updated using clean up process. Changed reactant from AMPICILLIN (ingredient) to AMPICILLIN(file - PSNDF(50.6,)     Current Outpatient Medications   Medication Sig Dispense Refill    allopurinol 200 MG tablet Take 200 mg by mouth Daily. 90 tablet 1    aspirin 81 MG chewable tablet Chew 1 tablet Daily.      azelaic acid (AZELEX) 15 % gel       cetirizine (zyrTEC) 10 MG tablet Take 1 tablet by mouth Every Night. 90 tablet 1    DULoxetine (CYMBALTA) 60 MG capsule Take 1 capsule by mouth 2 (Two) Times a Day. 180 capsule 1    famotidine (Pepcid) 20 MG tablet Take 1 tablet by mouth 2 (Two) Times a Day. 180 tablet 1    fluticasone (FLONASE) 50 MCG/ACT nasal spray Administer 2 sprays into the nostril(s) as directed by provider Daily. 2 sprays each nostril daily 16 g 1    glucose blood (FREESTYLE LITE) test strip Use one test strip daily to check blood sugar levels. 100 each 2    ketoconazole (NIZORAL) 2 % shampoo       Lancets (freestyle) lancets 1 each by Other route 2 (Two) Times a Day. 100 each 3    levothyroxine (Synthroid) 50 MCG tablet Take 1 tablet by mouth Daily. 90 tablet 1    psyllium (Metamucil Smooth Texture) 58.6 % powder Take  by mouth Daily. 660 g 3    rosuvastatin (Crestor) 40 MG tablet Take 1 tablet by mouth Every Night. 90 tablet 1    Tirzepatide 10 MG/0.5ML solution auto-injector Inject 10 mg under the skin into the appropriate area as directed 1 (One) Time Per Week. 2 mL 5    vitamin D (ERGOCALCIFEROL) 1.25 MG (96473 UT) capsule capsule Take 1 capsule by mouth 1 (One) Time Per Week. 13 capsule 1    Gabapentin 10 %, Baclofen 2 %, lidocaine 4 %, Ketamine HCl 4 % Apply 1-2 g topically to the appropriate area as directed 3 (Three) to 4 (Four) times daily. 90 g 5     No current facility-administered medications for this visit.     Review of Systems    Constitutional: Negative.    Neurological:  Positive for numbness.   All other systems reviewed and are negative.      OBJECTIVE     Vitals:    06/24/25 1310   BP: 136/80   Pulse: 93   SpO2: 96%       Body mass index is 26.78 kg/m².    Lab Results   Component Value Date    HGBA1C 5.70 (H) 05/19/2025       Lab Results   Component Value Date    GLUCOSE 77 05/19/2025    CALCIUM 9.4 05/19/2025     05/19/2025    K 5.1 05/19/2025    CO2 28.8 05/19/2025     05/19/2025    BUN 18 05/19/2025    CREATININE 1.26 (H) 05/19/2025    EGFRIFNONA 36 (L) 09/10/2021    BCR 14.3 05/19/2025    ANIONGAP 9.2 05/19/2025       Patient seen in no apparent distress.      PHYSICAL EXAM:     Foot/Ankle Exam    GENERAL  Diabetic foot exam performed    Appearance:  appears stated age, elderly and healthy  Orientation:  AAOx3  Affect:  appropriate  Gait:  unimpaired  Assistance:  independent  Right shoe gear: sandal  Left shoe gear: sandal    VASCULAR     Right Foot Vascularity   Normal vascular exam    Dorsalis pedis:  2+  Posterior tibial:  2+  Skin temperature:  warm  Edema grading:  None  CFT:  < 3 seconds  Pedal hair growth:  Present  Varicosities:  none     Left Foot Vascularity   Normal vascular exam    Dorsalis pedis:  2+  Posterior tibial:  2+  Skin temperature:  warm  Edema grading:  None  CFT:  < 3 seconds  Pedal hair growth:  Present  Varicosities:  none     NEUROLOGIC     Right Foot Neurologic   Light touch sensation: diminished  Vibratory sensation: diminished  Hot/Cold sensation: diminished  Protective Sensation using [place] Monofilament:   Sites intact: 5  Sites tested: 10     Left Foot Neurologic   Light touch sensation: diminished  Vibratory sensation: diminished  Hot/Cold sensation:  diminished  Protective Sensation using [place] Monofilament:   Sites intact: 5  Sites tested: 10    MUSCLE STRENGTH     Right Foot Muscle Strength   Foot dorsiflexion:  4  Foot plantar flexion:  4  Foot inversion:   4  Foot eversion:  4     Left Foot Muscle Strength   Foot dorsiflexion:  4  Foot plantar flexion:  4  Foot inversion:  4  Foot eversion:  4    RANGE OF MOTION     Right Foot Range of Motion   Foot and ankle ROM within normal limits       Left Foot Range of Motion   Foot and ankle ROM within normal limits      DERMATOLOGIC      Right Foot Dermatologic   Skin  Right foot skin is intact.      Left Foot Dermatologic   Skin  Left foot skin is intact.     Diabetic Foot Exam Performed and Monofilament Test Performed    I have reexamined the patient the results are consistent with the previously documented exam.    ASSESSMENT/PLAN     Diagnoses and all orders for this visit:    1. Type 2 diabetes mellitus with polyneuropathy (Primary)    2. Neuropathy  -     Gabapentin 10 %, Baclofen 2 %, lidocaine 4 %, Ketamine HCl 4 %; Apply 1-2 g topically to the appropriate area as directed 3 (Three) to 4 (Four) times daily.  Dispense: 90 g; Refill: 5    3. DM feet    4. Lumbar radiculopathy    5. Foot pain, bilateral    Rx:  Topical, compounded, neuropathy medication was written.    Comprehensive lower extremity examination and evaluation was performed.    Discussed findings and treatment plan including risks, benefits, and treatment options with patient in detail. Patient agreed with treatment plan.    Medications and allergies reviewed.  Reviewed available blood glucose and HgB A1C lab values along with other pertinent labs.  These were discussed with the patient as to their importance of diabetic maintenance.    Diabetic foot exam performed and documented this date, compliant with CQM required standards. Detail of findings as noted in physical exam.  Lower extremity Neurologic exam for diabetic patient performed and documented this date, compliant with PQRS required standards. Detail of findings as noted in physical exam.  Advised patient importance of good routine lower extremity hygiene. Advised patient importance of evaluating  for intact skin and pain free nail borders.  Advised patient to use mirror to evaluate plantar/ soles of feet for better visualization. Advised patient monitor and phone office to be seen if any cracking to skin, open lesions, painful nail borders or if nails become elongated prior to next visit. Advised patient importance of daily cleansing of lower extremities, followed by good skin cream to maintain normal hydration of skin. Also advised patient importance of close daily monitoring of blood sugar. Advised to regulate diet and medications to maintain control of blood sugar in optimal range. Contact primary care provider if difficulties maintaining blood sugar levels.  Advised Patient of presence of Diabetes Mellitus condition.  Advised Patient risk of progression and worsening or improvement, then return of condition.  Will monitor condition for any change in future. Treat with most appropriate treatment pending status of condition.  Counseled and advised patient extensively on nature and ramifications of diabetes. Standard instructions given to patient for good diabetic foot care and maintenance. Advised importance of careful monitoring to avoid break down and complications secondary to diabetes. Advised patient importance of strict maintenance of blood sugar control. Advised patient of possible ominous results from neglect of condition, i.e.: amputation/ loss of digits, feet and legs, or even death.  Patient states understands counseling, will monitor closely, continue good hygiene and routine diabetic foot care. Patient will contact office is questions or problems.      An After Visit Summary was printed and given to the patient at discharge, including (if requested) any available informative/educational handouts regarding diagnosis, treatment, or medications. All questions were answered to patient/family satisfaction. Should symptoms fail to improve or worsen they agree to call or return to clinic or to go to  the Emergency Department. Discussed the importance of following up with any needed screening tests/labs/specialist appointments and any requested follow-up recommended by me today. Importance of maintaining follow-up discussed and patient accepts that missed appointments can delay diagnosis and potentially lead to worsening of conditions.    Return in about 1 year (around 6/24/2026) for DFE., or sooner if acute issues arise.  I have reviewed the assessment and plan and verified the accuracy of it. No changes to assessment and plan since the information was documented. Eric Ibanez DPM 06/24/25     I have dictated this note utilizing Dragon Dictation.  Please note that portions of this note were completed with a voice recognition program.  Part of this note may be an electronic transcription/translation of spoken language to printed text using the Dragon Dictation System.      This document has been electronically signed by Eric Ibanez DPM on June 24, 2025 13:44 EDT

## 2025-06-24 NOTE — PROGRESS NOTES
"Chief Complaint  Pain and Initial Evaluation of the Right Hip       Subjective      Vickie Bull presents to [facility] for an evaluation of her right hip. Her right hip has been bothering her for several weeks but denies any specific injury, trauma, falls or prior surgery. She locates her pain to the posterior  lateral hip and states the pain radiates down her leg. She has occasional numbness and tingling. She is not able to take anti inflammatories due to her kidneys.      Allergies   Allergen Reactions    Cephalexin Hives    Latex Itching    Penicillins Hives, Rash and Itching     Other Reaction(s): Eruption of skin, Unknown, Unknown, Eruption of skin, Unknown, Eruption of skin      PER PT Updated using clean up process. Changed reactant from AMPICILLIN (ingredient) to AMPICILLIN(file - PSNDF(50.6,)        Social History     Socioeconomic History    Marital status:    Tobacco Use    Smoking status: Never     Passive exposure: Never    Smokeless tobacco: Never    Tobacco comments:     second hand smoke exposure-never   Vaping Use    Vaping status: Never Used   Substance and Sexual Activity    Alcohol use: Yes     Comment: Rarely    Drug use: Never    Sexual activity: Not Currently     Partners: Male     Birth control/protection: Post-menopausal, Tubal ligation        I reviewed the patient's chief complaint, history of present illness, review of systems, past medical history, surgical history, family history, social history, medications, and allergy list.     Review of Systems     Constitutional: Denies fevers, chills, weight loss  Cardiovascular: Denies chest pain, shortness of breath  Skin: Denies rashes, acute skin changes  Neurologic: Denies headache, loss of consciousness  MSK: right hip pain       Vital Signs:   Ht 162.6 cm (64\")   Wt 70.8 kg (156 lb)   BMI 26.78 kg/m²            Ortho Exam    Physical Exam  General:Alert. No acute distress   Right lower extremity: " hip flexion  to 110 degrees, external rotation  to 40 degrees, internal rotation to 25 degrees, mild pain with straight leg raise, tender to the lateral  hip and posterior  hip, distal neurovascularly intact, positive  pulses, positive EHL, FHL, GS, and TA. Sensation intact to all 5 nerves of the foot.     RIGHT HIP  INJECTION: R greater trochanteric bursa  Date/Time: 6/24/2025 3:26 PM  Consent given by: patient  Site marked: site marked  Timeout: Immediately prior to procedure a time out was called to verify the correct patient, procedure, equipment, support staff and site/side marked as required   Supporting Documentation  Indications: pain   Procedure Details  Location: hip - R greater trochanteric bursa  Needle size: 22 G  Medications administered: 5 mL lidocaine 1 %; 40 mg triamcinolone acetonide 40 MG/ML  Patient tolerance: patient tolerated the procedure well with no immediate complications    This injection documentation was Scribed for Prudencio Zelaya MD by Lashawn Yeboah MA.  06/24/25   15:27 EDT    X-Ray Report:  Right hip X-Ray  Indication: Evaluation of right hip pain   AP/Lateral view(s)  Findings: enthesophyte to the greater trochanter, mild degenerative changes to the right hip, no acute fracture   Prior studies available for comparison: no        Imaging Results (Most Recent)       Procedure Component Value Units Date/Time    XR Hip With or Without Pelvis 2 - 3 View Right - In process [[id number]] Resulted: 06/24/25 1459     Updated: 06/24/25 1501    This result has not been signed. Information might be incomplete.               Result Review :       No results found.           Assessment and Plan     Diagnoses and all orders for this visit:    1. Right hip pain (Primary)  -     XR Hip With or Without Pelvis 2 - 3 View Right    2. Trochanteric bursitis of right hip    3. Lumbar radiculopathy      The patient presents here today for an evaluation  of her right hip. X-rays were obtained in the  office today and these were reviewed today.     Discussed the risks and benefits of a right hip bursitis injection today in the office. Patient expressed understanding and wishes to proceed. Patient tolerted the injection well and without any complications.     Discussed with the patient that due to the steroid injection given today in the office they may see an increase in blood sugar for a few days. Advised patient to monitor sugar after receiving the injection.      Home exercises given today and will continue current medications for pain control as she is not able to take anti inflammatories.      May consider an MRI on her right hip and lumbar if symptoms persist.     Call or return if worsening symptoms.    Follow Up     6 - 8 weeks     Patient was given instructions and counseling regarding her condition or for health maintenance advice. Please see specific information pulled into the AVS if appropriate.     Scribed for Prudencio Zelaya MD by Cammie Austin.  06/24/25   15:12 EDT    I have personally performed the services described in this document as scribed by the above individual and it is both accurate and complete. Prudencio Zelaya MD 06/26/25

## 2025-06-26 RX ORDER — LIDOCAINE HYDROCHLORIDE 10 MG/ML
5 INJECTION, SOLUTION INFILTRATION; PERINEURAL
Status: COMPLETED | OUTPATIENT
Start: 2025-06-24 | End: 2025-06-24

## 2025-06-26 RX ORDER — TRIAMCINOLONE ACETONIDE 40 MG/ML
40 INJECTION, SUSPENSION INTRA-ARTICULAR; INTRAMUSCULAR
Status: COMPLETED | OUTPATIENT
Start: 2025-06-24 | End: 2025-06-24

## 2025-08-05 ENCOUNTER — OFFICE VISIT (OUTPATIENT)
Dept: ORTHOPEDIC SURGERY | Facility: CLINIC | Age: 70
End: 2025-08-05
Payer: MEDICARE

## 2025-08-05 VITALS
HEART RATE: 83 BPM | BODY MASS INDEX: 26.63 KG/M2 | HEIGHT: 64 IN | SYSTOLIC BLOOD PRESSURE: 115 MMHG | DIASTOLIC BLOOD PRESSURE: 74 MMHG | OXYGEN SATURATION: 98 % | WEIGHT: 156 LBS

## 2025-08-05 DIAGNOSIS — M70.61 TROCHANTERIC BURSITIS OF RIGHT HIP: Primary | ICD-10-CM

## 2025-08-05 PROCEDURE — 3078F DIAST BP <80 MM HG: CPT | Performed by: PHYSICIAN ASSISTANT

## 2025-08-05 PROCEDURE — 3074F SYST BP LT 130 MM HG: CPT | Performed by: PHYSICIAN ASSISTANT

## 2025-08-05 PROCEDURE — 99213 OFFICE O/P EST LOW 20 MIN: CPT | Performed by: PHYSICIAN ASSISTANT

## 2025-08-05 PROCEDURE — 1159F MED LIST DOCD IN RCRD: CPT | Performed by: PHYSICIAN ASSISTANT

## 2025-08-05 PROCEDURE — 1160F RVW MEDS BY RX/DR IN RCRD: CPT | Performed by: PHYSICIAN ASSISTANT

## (undated) DEVICE — Device: Brand: DEFENDO AIR/WATER/SUCTION AND BIOPSY VALVE

## (undated) DEVICE — SOL IRRG H2O PL/BG 1000ML STRL

## (undated) DEVICE — ESOPHAGEAL BALLOON DILATATION CATHETER: Brand: CRE FIXED WIRE

## (undated) DEVICE — CONN JET HYDRA H20 AUXILIARY DISP

## (undated) DEVICE — DEV INFL CRE STERIFLATE 60CC DISP

## (undated) DEVICE — BLCK/BITE BLOX WO/DENTL/RIM W/STRAP 54F

## (undated) DEVICE — SOLIDIFIER LIQLOC PLS 1500CC BT

## (undated) DEVICE — LINER SURG CANSTR SXN S/RIGD 1500CC

## (undated) DEVICE — Device

## (undated) DEVICE — SINGLE-USE BIOPSY FORCEPS: Brand: RADIAL JAW 4